# Patient Record
Sex: FEMALE | Race: OTHER | NOT HISPANIC OR LATINO | URBAN - METROPOLITAN AREA
[De-identification: names, ages, dates, MRNs, and addresses within clinical notes are randomized per-mention and may not be internally consistent; named-entity substitution may affect disease eponyms.]

---

## 2020-07-28 NOTE — ED PROCEDURE NOTE - PROCEDURE ADDITIONAL DETAILS
Emergency Department Focused Ultrasound performed at patient's bedside for educational purposes. The study will have a follow up study performed or was performed in the direct supervision of an ultrasound trained attending. + small free fluid in morrsion's pouch- plan for CT a/p to further eval.

## 2020-07-29 ENCOUNTER — INPATIENT (INPATIENT)
Facility: HOSPITAL | Age: 65
LOS: 1 days | Discharge: HOME | DRG: 919 | End: 2020-07-31
Attending: OBSTETRICS & GYNECOLOGY | Admitting: OBSTETRICS & GYNECOLOGY
Payer: COMMERCIAL

## 2020-07-29 VITALS
TEMPERATURE: 100 F | HEART RATE: 126 BPM | DIASTOLIC BLOOD PRESSURE: 63 MMHG | SYSTOLIC BLOOD PRESSURE: 86 MMHG | OXYGEN SATURATION: 95 % | WEIGHT: 279.99 LBS | RESPIRATION RATE: 22 BRPM | HEIGHT: 66 IN

## 2020-07-29 DIAGNOSIS — Z90.722 ACQUIRED ABSENCE OF OVARIES, BILATERAL: Chronic | ICD-10-CM

## 2020-07-29 DIAGNOSIS — K66.1 HEMOPERITONEUM: ICD-10-CM

## 2020-07-29 LAB
ALBUMIN SERPL ELPH-MCNC: 3.6 G/DL — SIGNIFICANT CHANGE UP (ref 3.3–5)
ALP SERPL-CCNC: 33 U/L — LOW (ref 40–120)
ALT FLD-CCNC: 17 U/L — SIGNIFICANT CHANGE UP (ref 10–45)
ANION GAP SERPL CALC-SCNC: 13 MMOL/L — SIGNIFICANT CHANGE UP (ref 5–17)
ANION GAP SERPL CALC-SCNC: 20 MMOL/L — HIGH (ref 5–17)
ANTIBODY ID 1_1: SIGNIFICANT CHANGE UP
APPEARANCE UR: CLEAR — SIGNIFICANT CHANGE UP
APTT BLD: 19.6 SEC — LOW (ref 27.5–35.5)
AST SERPL-CCNC: 28 U/L — SIGNIFICANT CHANGE UP (ref 10–40)
BASE EXCESS BLDV CALC-SCNC: -2.4 MMOL/L — LOW (ref -2–2)
BASE EXCESS BLDV CALC-SCNC: -3.7 MMOL/L — LOW (ref -2–2)
BASOPHILS # BLD AUTO: 0 K/UL — SIGNIFICANT CHANGE UP (ref 0–0.2)
BASOPHILS # BLD AUTO: 0.01 K/UL — SIGNIFICANT CHANGE UP (ref 0–0.2)
BASOPHILS NFR BLD AUTO: 0 % — SIGNIFICANT CHANGE UP (ref 0–2)
BASOPHILS NFR BLD AUTO: 0.1 % — SIGNIFICANT CHANGE UP (ref 0–2)
BILIRUB SERPL-MCNC: 0.3 MG/DL — SIGNIFICANT CHANGE UP (ref 0.2–1.2)
BILIRUB UR-MCNC: NEGATIVE — SIGNIFICANT CHANGE UP
BLD GP AB SCN SERPL QL: POSITIVE — SIGNIFICANT CHANGE UP
BUN SERPL-MCNC: 26 MG/DL — HIGH (ref 7–23)
BUN SERPL-MCNC: 28 MG/DL — HIGH (ref 7–23)
CA-I SERPL-SCNC: 1.02 MMOL/L — LOW (ref 1.12–1.3)
CA-I SERPL-SCNC: 1.06 MMOL/L — LOW (ref 1.12–1.3)
CALCIUM SERPL-MCNC: 8 MG/DL — LOW (ref 8.4–10.5)
CALCIUM SERPL-MCNC: 8.3 MG/DL — LOW (ref 8.4–10.5)
CHLORIDE BLDV-SCNC: 99 MMOL/L — SIGNIFICANT CHANGE UP (ref 96–108)
CHLORIDE BLDV-SCNC: 99 MMOL/L — SIGNIFICANT CHANGE UP (ref 96–108)
CHLORIDE SERPL-SCNC: 101 MMOL/L — SIGNIFICANT CHANGE UP (ref 96–108)
CHLORIDE SERPL-SCNC: 95 MMOL/L — LOW (ref 96–108)
CO2 BLDV-SCNC: 22 MMOL/L — SIGNIFICANT CHANGE UP (ref 22–30)
CO2 BLDV-SCNC: 24 MMOL/L — SIGNIFICANT CHANGE UP (ref 22–30)
CO2 SERPL-SCNC: 18 MMOL/L — LOW (ref 22–31)
CO2 SERPL-SCNC: 22 MMOL/L — SIGNIFICANT CHANGE UP (ref 22–31)
COLOR SPEC: YELLOW — SIGNIFICANT CHANGE UP
CREAT SERPL-MCNC: 1.19 MG/DL — SIGNIFICANT CHANGE UP (ref 0.5–1.3)
CREAT SERPL-MCNC: 1.53 MG/DL — HIGH (ref 0.5–1.3)
DIFF PNL FLD: NEGATIVE — SIGNIFICANT CHANGE UP
EOSINOPHIL # BLD AUTO: 0 K/UL — SIGNIFICANT CHANGE UP (ref 0–0.5)
EOSINOPHIL # BLD AUTO: 0 K/UL — SIGNIFICANT CHANGE UP (ref 0–0.5)
EOSINOPHIL NFR BLD AUTO: 0 % — SIGNIFICANT CHANGE UP (ref 0–6)
EOSINOPHIL NFR BLD AUTO: 0 % — SIGNIFICANT CHANGE UP (ref 0–6)
GAS PNL BLDV: 128 MMOL/L — LOW (ref 135–145)
GAS PNL BLDV: 129 MMOL/L — LOW (ref 135–145)
GAS PNL BLDV: SIGNIFICANT CHANGE UP
GLUCOSE BLDC GLUCOMTR-MCNC: 144 MG/DL — HIGH (ref 70–99)
GLUCOSE BLDC GLUCOMTR-MCNC: 156 MG/DL — HIGH (ref 70–99)
GLUCOSE BLDC GLUCOMTR-MCNC: 161 MG/DL — HIGH (ref 70–99)
GLUCOSE BLDV-MCNC: 161 MG/DL — HIGH (ref 70–99)
GLUCOSE BLDV-MCNC: 226 MG/DL — HIGH (ref 70–99)
GLUCOSE SERPL-MCNC: 164 MG/DL — HIGH (ref 70–99)
GLUCOSE SERPL-MCNC: 242 MG/DL — HIGH (ref 70–99)
GLUCOSE UR QL: NEGATIVE — SIGNIFICANT CHANGE UP
HCO3 BLDV-SCNC: 21 MMOL/L — SIGNIFICANT CHANGE UP (ref 21–29)
HCO3 BLDV-SCNC: 23 MMOL/L — SIGNIFICANT CHANGE UP (ref 21–29)
HCT VFR BLD CALC: 23.7 % — LOW (ref 34.5–45)
HCT VFR BLD CALC: 24 % — LOW (ref 34.5–45)
HCT VFR BLD CALC: 30.4 % — LOW (ref 34.5–45)
HCT VFR BLDA CALC: 25 % — LOW (ref 39–50)
HCT VFR BLDA CALC: 30 % — LOW (ref 39–50)
HGB BLD CALC-MCNC: 8 G/DL — LOW (ref 11.5–15.5)
HGB BLD CALC-MCNC: 9.5 G/DL — LOW (ref 11.5–15.5)
HGB BLD-MCNC: 10.2 G/DL — LOW (ref 11.5–15.5)
HGB BLD-MCNC: 7.8 G/DL — LOW (ref 11.5–15.5)
HGB BLD-MCNC: 8 G/DL — LOW (ref 11.5–15.5)
IMM GRANULOCYTES NFR BLD AUTO: 1.1 % — SIGNIFICANT CHANGE UP (ref 0–1.5)
INR BLD: 0.97 RATIO — SIGNIFICANT CHANGE UP (ref 0.88–1.16)
KETONES UR-MCNC: SIGNIFICANT CHANGE UP
LACTATE BLDV-MCNC: 3.4 MMOL/L — HIGH (ref 0.7–2)
LACTATE BLDV-MCNC: 5 MMOL/L — CRITICAL HIGH (ref 0.7–2)
LACTATE SERPL-SCNC: 2.1 MMOL/L — HIGH (ref 0.7–2)
LEUKOCYTE ESTERASE UR-ACNC: NEGATIVE — SIGNIFICANT CHANGE UP
LYMPHOCYTES # BLD AUTO: 0.37 K/UL — LOW (ref 1–3.3)
LYMPHOCYTES # BLD AUTO: 1.29 K/UL — SIGNIFICANT CHANGE UP (ref 1–3.3)
LYMPHOCYTES # BLD AUTO: 1.7 % — LOW (ref 13–44)
LYMPHOCYTES # BLD AUTO: 8.7 % — LOW (ref 13–44)
MCHC RBC-ENTMCNC: 29.9 PG — SIGNIFICANT CHANGE UP (ref 27–34)
MCHC RBC-ENTMCNC: 30 PG — SIGNIFICANT CHANGE UP (ref 27–34)
MCHC RBC-ENTMCNC: 31.6 PG — SIGNIFICANT CHANGE UP (ref 27–34)
MCHC RBC-ENTMCNC: 32.9 GM/DL — SIGNIFICANT CHANGE UP (ref 32–36)
MCHC RBC-ENTMCNC: 33.3 GM/DL — SIGNIFICANT CHANGE UP (ref 32–36)
MCHC RBC-ENTMCNC: 33.6 GM/DL — SIGNIFICANT CHANGE UP (ref 32–36)
MCV RBC AUTO: 89.4 FL — SIGNIFICANT CHANGE UP (ref 80–100)
MCV RBC AUTO: 90.8 FL — SIGNIFICANT CHANGE UP (ref 80–100)
MCV RBC AUTO: 94.9 FL — SIGNIFICANT CHANGE UP (ref 80–100)
MONOCYTES # BLD AUTO: 0.2 K/UL — SIGNIFICANT CHANGE UP (ref 0–0.9)
MONOCYTES # BLD AUTO: 1.17 K/UL — HIGH (ref 0–0.9)
MONOCYTES NFR BLD AUTO: 0.9 % — LOW (ref 2–14)
MONOCYTES NFR BLD AUTO: 7.9 % — SIGNIFICANT CHANGE UP (ref 2–14)
NEUTROPHILS # BLD AUTO: 12.19 K/UL — HIGH (ref 1.8–7.4)
NEUTROPHILS # BLD AUTO: 21.04 K/UL — HIGH (ref 1.8–7.4)
NEUTROPHILS NFR BLD AUTO: 82.2 % — HIGH (ref 43–77)
NEUTROPHILS NFR BLD AUTO: 95.6 % — HIGH (ref 43–77)
NEUTS BAND # BLD: 0.9 % — SIGNIFICANT CHANGE UP (ref 0–8)
NITRITE UR-MCNC: NEGATIVE — SIGNIFICANT CHANGE UP
NRBC # BLD: 0 /100 WBCS — SIGNIFICANT CHANGE UP (ref 0–0)
NRBC # BLD: 0 /100 WBCS — SIGNIFICANT CHANGE UP (ref 0–0)
OB PNL STL: NEGATIVE — SIGNIFICANT CHANGE UP
PCO2 BLDV: 38 MMHG — SIGNIFICANT CHANGE UP (ref 35–50)
PCO2 BLDV: 44 MMHG — SIGNIFICANT CHANGE UP (ref 35–50)
PH BLDV: 7.33 — LOW (ref 7.35–7.45)
PH BLDV: 7.36 — SIGNIFICANT CHANGE UP (ref 7.35–7.45)
PH UR: 6 — SIGNIFICANT CHANGE UP (ref 5–8)
PLAT MORPH BLD: NORMAL — SIGNIFICANT CHANGE UP
PLATELET # BLD AUTO: 152 K/UL — SIGNIFICANT CHANGE UP (ref 150–400)
PLATELET # BLD AUTO: 160 K/UL — SIGNIFICANT CHANGE UP (ref 150–400)
PLATELET # BLD AUTO: 246 K/UL — SIGNIFICANT CHANGE UP (ref 150–400)
PO2 BLDV: 26 MMHG — SIGNIFICANT CHANGE UP (ref 25–45)
PO2 BLDV: 27 MMHG — SIGNIFICANT CHANGE UP (ref 25–45)
POTASSIUM BLDV-SCNC: 3.7 MMOL/L — SIGNIFICANT CHANGE UP (ref 3.5–5.3)
POTASSIUM BLDV-SCNC: 3.9 MMOL/L — SIGNIFICANT CHANGE UP (ref 3.5–5.3)
POTASSIUM SERPL-MCNC: 4.2 MMOL/L — SIGNIFICANT CHANGE UP (ref 3.5–5.3)
POTASSIUM SERPL-MCNC: 4.4 MMOL/L — SIGNIFICANT CHANGE UP (ref 3.5–5.3)
POTASSIUM SERPL-SCNC: 4.2 MMOL/L — SIGNIFICANT CHANGE UP (ref 3.5–5.3)
POTASSIUM SERPL-SCNC: 4.4 MMOL/L — SIGNIFICANT CHANGE UP (ref 3.5–5.3)
PROT SERPL-MCNC: 5.8 G/DL — LOW (ref 6–8.3)
PROT UR-MCNC: SIGNIFICANT CHANGE UP
PROTHROM AB SERPL-ACNC: 11.6 SEC — SIGNIFICANT CHANGE UP (ref 10.6–13.6)
RBC # BLD: 2.53 M/UL — LOW (ref 3.8–5.2)
RBC # BLD: 2.61 M/UL — LOW (ref 3.8–5.2)
RBC # BLD: 3.4 M/UL — LOW (ref 3.8–5.2)
RBC # FLD: 13.6 % — SIGNIFICANT CHANGE UP (ref 10.3–14.5)
RBC # FLD: 15.9 % — HIGH (ref 10.3–14.5)
RBC # FLD: 16 % — HIGH (ref 10.3–14.5)
RBC BLD AUTO: NORMAL — SIGNIFICANT CHANGE UP
RH IG SCN BLD-IMP: NEGATIVE — SIGNIFICANT CHANGE UP
RH IG SCN BLD-IMP: NEGATIVE — SIGNIFICANT CHANGE UP
SAO2 % BLDV: 40 % — LOW (ref 67–88)
SAO2 % BLDV: 41 % — LOW (ref 67–88)
SARS-COV-2 RNA SPEC QL NAA+PROBE: SIGNIFICANT CHANGE UP
SODIUM SERPL-SCNC: 133 MMOL/L — LOW (ref 135–145)
SODIUM SERPL-SCNC: 136 MMOL/L — SIGNIFICANT CHANGE UP (ref 135–145)
SP GR SPEC: 1.02 — SIGNIFICANT CHANGE UP (ref 1.01–1.02)
UROBILINOGEN FLD QL: NEGATIVE — SIGNIFICANT CHANGE UP
VARIANT LYMPHS # BLD: 0.9 % — SIGNIFICANT CHANGE UP (ref 0–6)
WBC # BLD: 14.82 K/UL — HIGH (ref 3.8–10.5)
WBC # BLD: 19.15 K/UL — HIGH (ref 3.8–10.5)
WBC # BLD: 21.8 K/UL — HIGH (ref 3.8–10.5)
WBC # FLD AUTO: 14.82 K/UL — HIGH (ref 3.8–10.5)
WBC # FLD AUTO: 19.15 K/UL — HIGH (ref 3.8–10.5)
WBC # FLD AUTO: 21.8 K/UL — HIGH (ref 3.8–10.5)

## 2020-07-29 PROCEDURE — 86077 PHYS BLOOD BANK SERV XMATCH: CPT

## 2020-07-29 PROCEDURE — 71045 X-RAY EXAM CHEST 1 VIEW: CPT | Mod: 26

## 2020-07-29 PROCEDURE — 99231 SBSQ HOSP IP/OBS SF/LOW 25: CPT

## 2020-07-29 PROCEDURE — 74174 CTA ABD&PLVS W/CONTRAST: CPT | Mod: 26

## 2020-07-29 PROCEDURE — 99291 CRITICAL CARE FIRST HOUR: CPT

## 2020-07-29 RX ORDER — ACETAMINOPHEN 500 MG
975 TABLET ORAL EVERY 6 HOURS
Refills: 0 | Status: DISCONTINUED | OUTPATIENT
Start: 2020-07-29 | End: 2020-07-31

## 2020-07-29 RX ORDER — DEXTROSE 50 % IN WATER 50 %
25 SYRINGE (ML) INTRAVENOUS ONCE
Refills: 0 | Status: DISCONTINUED | OUTPATIENT
Start: 2020-07-29 | End: 2020-07-29

## 2020-07-29 RX ORDER — INSULIN LISPRO 100/ML
VIAL (ML) SUBCUTANEOUS AT BEDTIME
Refills: 0 | Status: DISCONTINUED | OUTPATIENT
Start: 2020-07-29 | End: 2020-07-29

## 2020-07-29 RX ORDER — PIPERACILLIN AND TAZOBACTAM 4; .5 G/20ML; G/20ML
3.38 INJECTION, POWDER, LYOPHILIZED, FOR SOLUTION INTRAVENOUS ONCE
Refills: 0 | Status: COMPLETED | OUTPATIENT
Start: 2020-07-29 | End: 2020-07-29

## 2020-07-29 RX ORDER — INSULIN LISPRO 100/ML
VIAL (ML) SUBCUTANEOUS
Refills: 0 | Status: DISCONTINUED | OUTPATIENT
Start: 2020-07-29 | End: 2020-07-29

## 2020-07-29 RX ORDER — SODIUM CHLORIDE 9 MG/ML
1000 INJECTION, SOLUTION INTRAVENOUS
Refills: 0 | Status: DISCONTINUED | OUTPATIENT
Start: 2020-07-29 | End: 2020-07-29

## 2020-07-29 RX ORDER — DEXTROSE 50 % IN WATER 50 %
12.5 SYRINGE (ML) INTRAVENOUS ONCE
Refills: 0 | Status: DISCONTINUED | OUTPATIENT
Start: 2020-07-29 | End: 2020-07-29

## 2020-07-29 RX ORDER — GLUCAGON INJECTION, SOLUTION 0.5 MG/.1ML
1 INJECTION, SOLUTION SUBCUTANEOUS ONCE
Refills: 0 | Status: DISCONTINUED | OUTPATIENT
Start: 2020-07-29 | End: 2020-07-31

## 2020-07-29 RX ORDER — DEXTROSE 50 % IN WATER 50 %
25 SYRINGE (ML) INTRAVENOUS ONCE
Refills: 0 | Status: DISCONTINUED | OUTPATIENT
Start: 2020-07-29 | End: 2020-07-31

## 2020-07-29 RX ORDER — DEXTROSE 50 % IN WATER 50 %
15 SYRINGE (ML) INTRAVENOUS ONCE
Refills: 0 | Status: DISCONTINUED | OUTPATIENT
Start: 2020-07-29 | End: 2020-07-31

## 2020-07-29 RX ORDER — OXYCODONE HYDROCHLORIDE 5 MG/1
5 TABLET ORAL EVERY 6 HOURS
Refills: 0 | Status: DISCONTINUED | OUTPATIENT
Start: 2020-07-29 | End: 2020-07-31

## 2020-07-29 RX ORDER — ACETAMINOPHEN 500 MG
650 TABLET ORAL ONCE
Refills: 0 | Status: COMPLETED | OUTPATIENT
Start: 2020-07-29 | End: 2020-07-29

## 2020-07-29 RX ORDER — SODIUM CHLORIDE 9 MG/ML
1000 INJECTION, SOLUTION INTRAVENOUS
Refills: 0 | Status: DISCONTINUED | OUTPATIENT
Start: 2020-07-29 | End: 2020-07-31

## 2020-07-29 RX ORDER — DEXTROSE 50 % IN WATER 50 %
12.5 SYRINGE (ML) INTRAVENOUS ONCE
Refills: 0 | Status: DISCONTINUED | OUTPATIENT
Start: 2020-07-29 | End: 2020-07-31

## 2020-07-29 RX ORDER — PANTOPRAZOLE SODIUM 20 MG/1
40 TABLET, DELAYED RELEASE ORAL ONCE
Refills: 0 | Status: DISCONTINUED | OUTPATIENT
Start: 2020-07-29 | End: 2020-07-31

## 2020-07-29 RX ORDER — INSULIN LISPRO 100/ML
VIAL (ML) SUBCUTANEOUS EVERY 6 HOURS
Refills: 0 | Status: DISCONTINUED | OUTPATIENT
Start: 2020-07-29 | End: 2020-07-31

## 2020-07-29 RX ORDER — DEXTROSE 50 % IN WATER 50 %
15 SYRINGE (ML) INTRAVENOUS ONCE
Refills: 0 | Status: DISCONTINUED | OUTPATIENT
Start: 2020-07-29 | End: 2020-07-29

## 2020-07-29 RX ORDER — GLUCAGON INJECTION, SOLUTION 0.5 MG/.1ML
1 INJECTION, SOLUTION SUBCUTANEOUS ONCE
Refills: 0 | Status: DISCONTINUED | OUTPATIENT
Start: 2020-07-29 | End: 2020-07-29

## 2020-07-29 RX ORDER — SODIUM CHLORIDE 9 MG/ML
1000 INJECTION INTRAMUSCULAR; INTRAVENOUS; SUBCUTANEOUS ONCE
Refills: 0 | Status: COMPLETED | OUTPATIENT
Start: 2020-07-29 | End: 2020-07-29

## 2020-07-29 RX ORDER — ONDANSETRON 8 MG/1
4 TABLET, FILM COATED ORAL ONCE
Refills: 0 | Status: COMPLETED | OUTPATIENT
Start: 2020-07-29 | End: 2020-07-29

## 2020-07-29 RX ADMIN — Medication 1: at 18:46

## 2020-07-29 RX ADMIN — ONDANSETRON 4 MILLIGRAM(S): 8 TABLET, FILM COATED ORAL at 12:55

## 2020-07-29 RX ADMIN — PIPERACILLIN AND TAZOBACTAM 200 GRAM(S): 4; .5 INJECTION, POWDER, LYOPHILIZED, FOR SOLUTION INTRAVENOUS at 13:32

## 2020-07-29 RX ADMIN — SODIUM CHLORIDE 125 MILLILITER(S): 9 INJECTION, SOLUTION INTRAVENOUS at 16:49

## 2020-07-29 RX ADMIN — SODIUM CHLORIDE 1000 MILLILITER(S): 9 INJECTION INTRAMUSCULAR; INTRAVENOUS; SUBCUTANEOUS at 12:54

## 2020-07-29 RX ADMIN — SODIUM CHLORIDE 125 MILLILITER(S): 9 INJECTION, SOLUTION INTRAVENOUS at 23:17

## 2020-07-29 RX ADMIN — Medication 650 MILLIGRAM(S): at 13:20

## 2020-07-29 RX ADMIN — Medication 975 MILLIGRAM(S): at 23:15

## 2020-07-29 NOTE — ED ADULT NURSE REASSESSMENT NOTE - NS ED NURSE REASSESS COMMENT FT1
pt resting on stretcher, vital signs remain stable, pt receiving 3rd unit of blood as ordered, pt denies any needs at this time, rn will continue to monitor.

## 2020-07-29 NOTE — ED ADULT NURSE NOTE - OBJECTIVE STATEMENT
pt reports he had a left oophrectomy yesterday, since the procedure she said shes felt faint and weak.  pt denies any fevers at home. pt denies any active bleeding. pt does report she has had urinary retention today. pt Is alert and oriented x3, speaking full clear sentences, respirations non-labored, skin warm dry and intact but pale, bruising throughout abd noted, 3 surgical lap sites on abd appear well.  there is no edema on assessment, strong pulses throughout, abd is soft and non-distended pt is able to move all extremities on command.

## 2020-07-29 NOTE — CONSULT NOTE ADULT - ASSESSMENT
Assessment: 65y Female with hemoperitoneum following BSO 1 day ago, referred for potential endovascular therapy. CTA was performed and was negative for active bleeding.    Plan:  -given negative CTA for arterial hemorrhage the yield of angiography is extremely low and there is no indication for angiography at this time  -if patient's clinical status worsens and repeat imaging is performed, and a source of hemorrhage is identified, reconsult interventional radiology for reconsideration for angiography/embolization  -Please call interventional radiology at [phone] during normal business hours, or [phone] and page [phone] during call hours or weekends with any questions, concerns or issues.    Vivek Mojica MD, RPVI  Chief Resident, Interventional Radiology  [facility]: [phone] ([phone]  [facility]: [phone] Assessment: 65y Female with hemoperitoneum following BSO 1 day ago, referred for potential drainage of the hemoperitoneum. CTA was performed and was negative for active bleeding.    Plan:  -given negative CTA for arterial hemorrhage the yield of angiography is extremely low and there is no indication for angiography at this time  -would recommend observation and symptomatic management over drainage of hemoperitoneum  -Please call interventional radiology at [phone] during normal business hours, or [phone] and page [phone] during call hours or weekends with any questions, concerns or issues.    Vivek Mojica MD, RPVI  Chief Resident, Interventional Radiology  [facility]: [phone] (p) [phone]  [facility]: [phone]

## 2020-07-29 NOTE — ED ADULT NURSE NOTE - CHIEF COMPLAINT QUOTE
dizzy, urinary retention/constipated s/p ovarian cyst and elizabeth laporoscopic oopherectomy surgery yesterday at [facility]

## 2020-07-29 NOTE — ED ADULT NURSE REASSESSMENT NOTE - NS ED NURSE REASSESS COMMENT FT1
pt resting on stretcher,  pt reports that she is no longer having the faint/dizzy feeling unless she moves her head, pt denies any abd pain,  pts color has improved also , she is not as pale as she was before receiving the 2 units of rbcs, all vital signs stable, rn will continue to monitor.

## 2020-07-29 NOTE — H&P ADULT - NSHPPHYSICALEXAM_GEN_ALL_CORE
Vital Signs Last 24 Hrs  T(C): 37.8 (29 Jul 2020 17:30), Max: 37.9 (29 Jul 2020 13:00)  T(F): 100 (29 Jul 2020 17:30), Max: 100.2 (29 Jul 2020 13:00)  HR: 93 (29 Jul 2020 17:30) (93 - 133)  BP: 124/77 (29 Jul 2020 17:30) (73/54 - 141/84)  BP(mean): --  RR: 16 (29 Jul 2020 17:30) (16 - 24)  SpO2: 99% (29 Jul 2020 17:30) (95% - 100%)    Physical Exam:   General: NAD  CV: RR S1S2 no m/r/g  Lungs: CTA b/l, unlabored on RA  Abd: Soft, mildly distended, appropriately tender, + BS, laparoscopic incisions c/d/i, diffuse ecchymosis in lower abdomen   Ext: non-tender b/l, no edema

## 2020-07-29 NOTE — ED PROVIDER NOTE - ATTENDING CONTRIBUTION TO CARE
Mendez DO: I have personally performed a face to face medical and diagnostic evaluation of the patient. I have discussed with and reviewed Dr Alisson Hanson, [employer] Fellow's, note and agree with the History, ROS, Physical Exam and MDM unless otherwise indicated. A brief summary of my personal evaluation and impression can be found below.     This patient is a 65F POD #1 from b/l oophorectomy, arrived to critical room B hypotensive systolics in the 70s with tachycardia 130-140, with reported generalized fatigue, weakness, dizziness and lack of urination post procedure x 24 hours (states only urinated x 1).  On exam pt oriented and alert. Pt noted to have diffuse abdominal ecchymosis, sutures of laparoscopic site of procedure intact, mild abd discomfort with +abd distension, but no rebound tenderness. Emergent release blood x 2 activated due to high suspicion for hemorrhagic shock, and 1L NS started to bridge to transfusion given profound hypotension, with good response in vitals and clinical symptoms. Pt rectally afebrile. Bedside Fast with very small free fluid in zepeda's pouch. OBGYN team notified of high suspicion for intraperitoneal bleeding. CTA a/p to eval for bleeding/source ordered but delayed due to resuscitation. Plan likely OR pending OBGYN assessment. Findings not suggestive of sepsis.     Hungarian  [id number] used for history taking and blood consent.

## 2020-07-29 NOTE — ED ADULT NURSE NOTE - NSIMPLEMENTINTERV_GEN_ALL_ED
Implemented All Universal Safety Interventions:  [place] to call system. Call bell, personal items and telephone within reach. Instruct patient to call for assistance. Room bathroom lighting operational. Non-slip footwear when patient is off stretcher. Physically safe environment: no spills, clutter or unnecessary equipment. Stretcher in lowest position, wheels locked, appropriate side rails in place.

## 2020-07-29 NOTE — ED PROVIDER NOTE - PMH
Arthritis    DM (diabetes mellitus) Arthritis    DM (diabetes mellitus)    HLD (hyperlipidemia)    HTN (hypertension)

## 2020-07-29 NOTE — H&P ADULT - HISTORY OF PRESENT ILLNESS
to be filled out REED MAYER  65y  Female [id number]     ID: [id number], [id number] Russian    HPI: 66yo P1, postmenopausal female, POD#1 s/p b/l oophorectomy presenting with weakness and subjective low urine output  Upon arrival to ED pt hypotensive to 70s/50s, tachycardic to 120-130s s/p 2uPRBC and 1L IVF bolus with improvement in vital signs. On face to face, pt reports progressive weakness, nausea, and decreased urine output. States she has only urinated 3 times since surgery with small amounts, denies urge to void. She reports minimal abdominal pain, some vaginal spotting, otherwise denies chest pain, shortness of breath, palpitations.    Ob/Gyn Physician: Dr. Woo ([facility]); called and spoke to him over the phone; states uncomplicated LSC b/l oophorectomy with minimal blood loss.     Obhx:  x1  GynHx: Denies fibroids, abnormal pap smears, STIs, oophorectomy for ovarian cysts  PMH: Arthritis, HLD, Diabetes, GERD, HTN  PSH: appendectomy in childhood    Home Medications:  adalimumab 40 mg/0.4 mL subcutaneous kit: 40 milligram(s) subcutaneous every other week (2020 16:53)  amLODIPine 5 mg oral tablet: 1 tab(s) orally once a day (2020 16:54)  aspirin 81 mg oral tablet: orally once a day (2020 16:54)  atorvastatin 10 mg oral tablet: 1 tab(s) orally once a day (2020 16:56)  esomeprazole 40 mg oral delayed release capsule: 1 cap(s) orally once a day (2020 16:57)  folic acid 1 mg oral tablet: 1 tab(s) orally once a day (2020 16:57)  metFORMIN 500 mg oral tablet: 1 tab(s) orally 2 times a day (2020 16:59)  Methotrexate Sodium, Preservative Free:  (2020 17:00)      Hospital Meds:   MEDICATIONS  (STANDING):  acetaminophen   Tablet .. 975 milliGRAM(s) Oral every 6 hours  dextrose 5%. 1000 milliLiter(s) (50 mL/Hr) IV Continuous <Continuous>  dextrose 50% Injectable 12.5 Gram(s) IV Push once  dextrose 50% Injectable 25 Gram(s) IV Push once  dextrose 50% Injectable 25 Gram(s) IV Push once  insulin lispro (HumaLOG) corrective regimen sliding scale   SubCutaneous three times a day before meals  insulin lispro (HumaLOG) corrective regimen sliding scale   SubCutaneous at bedtime  lactated ringers. 1000 milliLiter(s) (125 mL/Hr) IV Continuous <Continuous>    MEDICATIONS  (PRN):  dextrose 40% Gel 15 Gram(s) Oral once PRN Blood Glucose LESS THAN 70 milliGRAM(s)/deciliter  glucagon  Injectable 1 milliGRAM(s) IntraMuscular once PRN Glucose LESS THAN 70 milligrams/deciliter  oxyCODONE    IR 5 milliGRAM(s) Oral every 6 hours PRN Severe Pain (7 - 10)

## 2020-07-29 NOTE — ED PROVIDER NOTE - CLINICAL SUMMARY MEDICAL DECISION MAKING FREE TEXT BOX
66yo F presents s/p B/L oophorectomy with nausea/dizziness/difficulty urinating presents tachycardic, hypotensive 70/40s, receiving 2 units emergent release blood, pending CTA abd/pelvis, OB team made aware

## 2020-07-29 NOTE — H&P ADULT - NSHPLABSRESULTS_GEN_ALL_CORE
LABS:                              7.8    14.82 )-----------( 152      ( 2020 14:41 )             23.7         133<L>  |  95<L>  |  28<H>  ----------------------------<  242<H>  4.2   |  18<L>  |  1.53<H>    Ca    8.3<L>      2020 12:43    TPro  5.8<L>  /  Alb  3.6  /  TBili  0.3  /  DBili  x   /  AST  28  /  ALT  17  /  AlkPhos  33<L>      I&O's Detail    2020 07:01  -  2020 18:33  --------------------------------------------------------  IN:  Total IN: 0 mL    OUT:    Indwelling Catheter - Urethral: 600 mL  Total OUT: 600 mL    Total NET: -600 mL        PT/INR - ( 2020 12:43 )   PT: 11.6 sec;   INR: 0.97 ratio         PTT - ( 2020 12:43 )  PTT:19.6 sec  Urinalysis Basic - ( 2020 14:00 )    Color: Yellow / Appearance: Clear / S.022 / pH: x  Gluc: x / Ketone: Trace  / Bili: Negative / Urobili: Negative   Blood: x / Protein: Trace / Nitrite: Negative   Leuk Esterase: Negative / RBC: x / WBC x   Sq Epi: x / Non Sq Epi: x / Bacteria: x    < from: CT Angio Abdomen and Pelvis w/ IV Cont (20 @ 14:13) >    EXAM:  CT ANGIO ABD PELV (W)AW IC                        FINDINGS:  LOWER CHEST: Moderate-sized hiatus hernia.    LIVER: Within normal limits.  BILE DUCTS: Normal caliber.  GALLBLADDER: Gallstones.  SPLEEN: Within normal limits.  PANCREAS: Within normal limits.  ADRENALS: 3.8 x 3.1 cm right adrenal lesion consistent with benign adenoma on precontrast images.  KIDNEYS/URETERS: Within normal limits.    BLADDER: Within normal limits.  REPRODUCTIVE ORGANS: Apparent small uterus identified on sagittal venous phase    BOWEL: No bowel obstruction. Appendix not visualized. Colonic diverticula.  PERITONEUM: Moderate to large hemoperitoneum. No active hemorrhage identified. 3 cm mid abdominal cyst. Small pneumoperitoneum consistent with recent surgery  VESSELS: Within normal limits.  RETROPERITONEUM/LYMPH NODES: No lymphadenopathy.  ABDOMINAL WALL: Infiltrative changes, more marked on the left, consistent with recent surgery  BONES: Degenerative change.    IMPRESSION:  Moderate to large hemoperitoneum. No active hemorrhage identified.  Right adrenal lesion with benign features.  Mid abdominal cyst.  Gallstones.                    RONNIE BEARDEN M.D., ATTENDING RADIOLOGIST  This document has been electronically signed. 2020  2:41PM        < end of copied text >

## 2020-07-29 NOTE — ED PROVIDER NOTE - CARE PLAN
Principal Discharge DX:	Hemoperitoneum Principal Discharge DX:	Hemoperitoneum  Secondary Diagnosis:	Hemorrhagic shock

## 2020-07-29 NOTE — ED ADULT NURSE REASSESSMENT NOTE - NS ED NURSE REASSESS COMMENT FT1
elias catheter inserted with teresa davis, pt tolerated procedure well. immediate return of 600 ml clear yellow urine

## 2020-07-29 NOTE — ED PROVIDER NOTE - PROGRESS NOTE DETAILS
Mendez DO: pt reassessed, stable vitals, ct a/p noted to have bleeding, no active extrav per radiologist, repeating vbg/ cbc, obgyn paged, apparent bleeding from prior oophorectomy site Mendez DO: pt with continued low grade tachycardia but normal mentation, normal blood pressure, obgyn resident at bedside, to discuss with her attending, discussed severity of pts illness and possible decompensation if bleeding restarts, rpt cbc was checked very early so may not be accurate but did not show an appropriate response to the 2u prbc transfusion from pts initial arrival Mendez DO: pt consented on arrival for emergent blood- Italian  [id number] used, witnessed by RN. Pt reassessed after 2u prbc of emergent blood, stable vitals, ct a/p noted to have bleeding, no active extrav per radiologist, repeating vbg/ cbc, obgyn paged, apparent bleeding from prior oophorectomy site, Mendez DO: pt consented on arrival for emergent blood- [place]  [id number] used, witnessed by RN Tank Mendez. Pt reassessed after 2u prbc of emergent blood, initial FAST +, discussed with obgyn, stable vitals, ct a/p noted to have bleeding, no active extrav per radiologist, repeating vbg/ cbc, obgyn paged, apparent bleeding from prior oophorectomy site, Mendez DO: pt with continued low grade tachycardia but normal mentation, normal blood pressure, obgyn resident at bedside, to discuss with her attending, discussed severity of pts illness and possible decompensation if bleeding restarts, rpt cbc was checked very early so may not be accurate but did not show an appropriate response to the 2u prbc transfusion from pts initial arrival. Pt initially afebrile, temp slowly rising 100.2, possible 2/2 to intraperitoneal blood but blood cx and abx ordered to cover for infectious etiology.

## 2020-07-29 NOTE — ED PROVIDER NOTE - OBJECTIVE STATEMENT
66yo F pmhx HTN HLD DM arthritis s/p B/L oophorectomy for postmenopausal ovarian cysts yesterday, comes in for dizziness, nausea and difficulty urinating since the procedure. Arrives in ED hypotensive and tachycardic. Denies fever chills cp sob abd pain.

## 2020-07-29 NOTE — H&P ADULT - PROBLEM SELECTOR PLAN 1
- Admit to Gyn Team  - NPO, IVF  - IR consulted for possible drainage  - will give 1uPRBC now  - 6pm Labs  - AM labs  - strict I&O, Abraham  - f/u blood and urine cx  - SCDs for DVT ppx  - ISS, monitor FS  - Tylenol, Oxcodone PRN for pain    PEDRO Mccall, PGY-2  patient seen and evaluated with Dr. Mcqueen

## 2020-07-29 NOTE — ED ADULT NURSE REASSESSMENT NOTE - NS ED NURSE REASSESS COMMENT FT1
0005 pt arrival to critical room b, pt is tachycardic to 130s and has pale skin tone.  md sen called to bedside immediately, iv access obtain and pt placed on monitor.  per md sen pt requiring emergent blood.  blood will be infused as ordered, please see blood transfusion documentation.

## 2020-07-29 NOTE — CONSULT NOTE ADULT - SUBJECTIVE AND OBJECTIVE BOX
Reason for Consult: 65y Female s/p bilateral oophorectomy presented with hemoperitoneum, IR consulted for potential embolization.    History of Present Illness:     REED MAYER  65y  Female [id number]     ID: [id number], [id number] [place]    HPI: 64yo P1, postmenopausal female, POD#1 s/p b/l oophorectomy presenting with weakness and subjective low urine output  Upon arrival to ED pt hypotensive to 70s/50s, tachycardic to 120-130s s/p 2uPRBC and 1L IVF bolus with improvement in vital signs. On face to face, pt reports progressive weakness, nausea, and decreased urine output. States she has only urinated 3 times since surgery with small amounts, denies urge to void. She reports minimal abdominal pain, some vaginal spotting, otherwise denies chest pain, shortness of breath, palpitations.    Ob/Gyn Physician: Dr. Woo ([facility]); called and spoke to him over the phone; states uncomplicated LSC b/l oophorectomy with minimal blood loss.     Obhx:  x1  GynHx: Denies fibroids, abnormal pap smears, STIs, oophorectomy for ovarian cysts  PMH: Arthritis, HLD, Diabetes, GERD, HTN  PSH: appendectomy in childhood    Home Medications:  adalimumab 40 mg/0.4 mL subcutaneous kit: 40 milligram(s) subcutaneous every other week (2020 16:53)  amLODIPine 5 mg oral tablet: 1 tab(s) orally once a day (2020 16:54)  aspirin 81 mg oral tablet: orally once a day (2020 16:54)  atorvastatin 10 mg oral tablet: 1 tab(s) orally once a day (2020 16:56)  esomeprazole 40 mg oral delayed release capsule: 1 cap(s) orally once a day (2020 16:57)  folic acid 1 mg oral tablet: 1 tab(s) orally once a day (2020 16:57)  metFORMIN 500 mg oral tablet: 1 tab(s) orally 2 times a day (2020 16:59)  Methotrexate Sodium, Preservative Free:  (2020 17:00)      Hospital Meds:   MEDICATIONS  (STANDING):  acetaminophen   Tablet .. 975 milliGRAM(s) Oral every 6 hours  dextrose 5%. 1000 milliLiter(s) (50 mL/Hr) IV Continuous <Continuous>  dextrose 50% Injectable 12.5 Gram(s) IV Push once  dextrose 50% Injectable 25 Gram(s) IV Push once  dextrose 50% Injectable 25 Gram(s) IV Push once  insulin lispro (HumaLOG) corrective regimen sliding scale   SubCutaneous three times a day before meals  insulin lispro (HumaLOG) corrective regimen sliding scale   SubCutaneous at bedtime  lactated ringers. 1000 milliLiter(s) (125 mL/Hr) IV Continuous <Continuous>    MEDICATIONS  (PRN):  dextrose 40% Gel 15 Gram(s) Oral once PRN Blood Glucose LESS THAN 70 milliGRAM(s)/deciliter  glucagon  Injectable 1 milliGRAM(s) IntraMuscular once PRN Glucose LESS THAN 70 milligrams/deciliter  oxyCODONE    IR 5 milliGRAM(s) Oral every 6 hours PRN Severe Pain (7 - 10) (2020 16:30)      Pertinent PMH/PSH:     HTN (hypertension)  HLD (hyperlipidemia)  Arthritis  DM (diabetes mellitus)  Status post bilateral oophorectomy      Allergies:     lidocaine (Unknown)      Medications:     lactated ringers.  acetaminophen   Tablet ..  oxyCODONE    IR  pantoprazole  Injectable  insulin lispro (HumaLOG) corrective regimen sliding scale  dextrose 5%.  dextrose 40% Gel  dextrose 50% Injectable  dextrose 50% Injectable  dextrose 50% Injectable  glucagon  Injectable      Vital Signs:    T(C): 37.4 (20 @ 21:29), Max: 37.9 (20 @ 13:00)  HR: 79 (20 @ 21:29) (79 - 133)  BP: 113/72 (20 @ 21:29) (73/54 - 141/84)  RR: 17 (20 @ 21:29) (16 - 24)  SpO2: 94% (20 @ 21:29) (94% - 100%)    Relevant Lab Results:            10.2  19.15)-----(160     (20 @ 19:06)         30.4     136 | 101 | 26  ----------------------< 164     (20 @ 19:06)  4.4 | 22 | 1.19       PT: 11.6 20 @ 12:43  aPTT: 19.6<L> 20 @ 12:43   INR: 0.97 20 @ 12:43      Imaging: CTA reviewed, there is moderate hemoperitoneum without arterial source of hemorrhage on arterial phase images. Reason for Consult: 65y Female s/p bilateral oophorectomy presented with hemoperitoneum, IR consulted for drainage of hemoperitoneum.    History of Present Illness:     REED MAYER  65y  Female [id number]     ID: [id number], [id number] [place]    HPI: 66yo P1, postmenopausal female, POD#1 s/p b/l oophorectomy presenting with weakness and subjective low urine output  Upon arrival to ED pt hypotensive to 70s/50s, tachycardic to 120-130s s/p 2uPRBC and 1L IVF bolus with improvement in vital signs. On face to face, pt reports progressive weakness, nausea, and decreased urine output. States she has only urinated 3 times since surgery with small amounts, denies urge to void. She reports minimal abdominal pain, some vaginal spotting, otherwise denies chest pain, shortness of breath, palpitations.    Ob/Gyn Physician: Dr. Woo ([facility]); called and spoke to him over the phone; states uncomplicated LSC b/l oophorectomy with minimal blood loss.     Obhx:  x1  GynHx: Denies fibroids, abnormal pap smears, STIs, oophorectomy for ovarian cysts  PMH: Arthritis, HLD, Diabetes, GERD, HTN  PSH: appendectomy in childhood    Home Medications:  adalimumab 40 mg/0.4 mL subcutaneous kit: 40 milligram(s) subcutaneous every other week (2020 16:53)  amLODIPine 5 mg oral tablet: 1 tab(s) orally once a day (2020 16:54)  aspirin 81 mg oral tablet: orally once a day (2020 16:54)  atorvastatin 10 mg oral tablet: 1 tab(s) orally once a day (2020 16:56)  esomeprazole 40 mg oral delayed release capsule: 1 cap(s) orally once a day (2020 16:57)  folic acid 1 mg oral tablet: 1 tab(s) orally once a day (2020 16:57)  metFORMIN 500 mg oral tablet: 1 tab(s) orally 2 times a day (2020 16:59)  Methotrexate Sodium, Preservative Free:  (2020 17:00)      Hospital Meds:   MEDICATIONS  (STANDING):  acetaminophen   Tablet .. 975 milliGRAM(s) Oral every 6 hours  dextrose 5%. 1000 milliLiter(s) (50 mL/Hr) IV Continuous <Continuous>  dextrose 50% Injectable 12.5 Gram(s) IV Push once  dextrose 50% Injectable 25 Gram(s) IV Push once  dextrose 50% Injectable 25 Gram(s) IV Push once  insulin lispro (HumaLOG) corrective regimen sliding scale   SubCutaneous three times a day before meals  insulin lispro (HumaLOG) corrective regimen sliding scale   SubCutaneous at bedtime  lactated ringers. 1000 milliLiter(s) (125 mL/Hr) IV Continuous <Continuous>    MEDICATIONS  (PRN):  dextrose 40% Gel 15 Gram(s) Oral once PRN Blood Glucose LESS THAN 70 milliGRAM(s)/deciliter  glucagon  Injectable 1 milliGRAM(s) IntraMuscular once PRN Glucose LESS THAN 70 milligrams/deciliter  oxyCODONE    IR 5 milliGRAM(s) Oral every 6 hours PRN Severe Pain (7 - 10) (2020 16:30)      Pertinent PMH/PSH:     HTN (hypertension)  HLD (hyperlipidemia)  Arthritis  DM (diabetes mellitus)  Status post bilateral oophorectomy      Allergies:     lidocaine (Unknown)      Medications:     lactated ringers.  acetaminophen   Tablet ..  oxyCODONE    IR  pantoprazole  Injectable  insulin lispro (HumaLOG) corrective regimen sliding scale  dextrose 5%.  dextrose 40% Gel  dextrose 50% Injectable  dextrose 50% Injectable  dextrose 50% Injectable  glucagon  Injectable      Vital Signs:    T(C): 37.4 (20 @ 21:29), Max: 37.9 (20 @ 13:00)  HR: 79 (20 @ 21:29) (79 - 133)  BP: 113/72 (20 @ 21:29) (73/54 - 141/84)  RR: 17 (20 @ 21:29) (16 - 24)  SpO2: 94% (20 @ 21:29) (94% - 100%)    Relevant Lab Results:            10.2  19.15)-----(160     (20 @ 19:06)         30.4     136 | 101 | 26  ----------------------< 164     (20 @ 19:06)  4.4 | 22 | 1.19       PT: 11.6 20 @ 12:43  aPTT: 19.6<L> 20 @ 12:43   INR: 0.97 20 @ 12:43      Imaging: CTA reviewed, there is moderate hemoperitoneum without arterial source of hemorrhage on arterial phase images.

## 2020-07-29 NOTE — ED ADULT NURSE REASSESSMENT NOTE - NS ED NURSE REASSESS COMMENT FT1
pt straight cathed at this time for 300 cc clear yellow urine,, rn ryan at bedside helping with procedure, pt handled procedure well.

## 2020-07-29 NOTE — H&P ADULT - ASSESSMENT
66yo P1, postmenopausal female, POD#1 s/p b/l oophorectomy presenting with weakness, abdominal ecchymosis and subjective low urine output with CT findings of large hemoperitoneum and no active bleeding. Pt initially presenting hypotensive and tachycardic, s/p 1L IV Bolus and 2uPRBC. Hemoperitoneum and abdominal ecchymosis likely 2/2 transection of inferior epigastric arteries during laparoscopic entry.

## 2020-07-30 LAB
A1C WITH ESTIMATED AVERAGE GLUCOSE RESULT: 5.4 % — SIGNIFICANT CHANGE UP (ref 4–5.6)
ANION GAP SERPL CALC-SCNC: 10 MMOL/L — SIGNIFICANT CHANGE UP (ref 5–17)
ANION GAP SERPL CALC-SCNC: 7 MMOL/L — SIGNIFICANT CHANGE UP (ref 5–17)
APTT BLD: 23.1 SEC — LOW (ref 27.5–35.5)
APTT BLD: 23.6 SEC — LOW (ref 27.5–35.5)
BASOPHILS # BLD AUTO: 0.01 K/UL — SIGNIFICANT CHANGE UP (ref 0–0.2)
BASOPHILS NFR BLD AUTO: 0.1 % — SIGNIFICANT CHANGE UP (ref 0–2)
BUN SERPL-MCNC: 17 MG/DL — SIGNIFICANT CHANGE UP (ref 7–23)
BUN SERPL-MCNC: 20 MG/DL — SIGNIFICANT CHANGE UP (ref 7–23)
CALCIUM SERPL-MCNC: 8 MG/DL — LOW (ref 8.4–10.5)
CALCIUM SERPL-MCNC: 8.3 MG/DL — LOW (ref 8.4–10.5)
CHLORIDE SERPL-SCNC: 106 MMOL/L — SIGNIFICANT CHANGE UP (ref 96–108)
CHLORIDE SERPL-SCNC: 109 MMOL/L — HIGH (ref 96–108)
CO2 SERPL-SCNC: 26 MMOL/L — SIGNIFICANT CHANGE UP (ref 22–31)
CO2 SERPL-SCNC: 28 MMOL/L — SIGNIFICANT CHANGE UP (ref 22–31)
CREAT SERPL-MCNC: 0.76 MG/DL — SIGNIFICANT CHANGE UP (ref 0.5–1.3)
CREAT SERPL-MCNC: 0.84 MG/DL — SIGNIFICANT CHANGE UP (ref 0.5–1.3)
CULTURE RESULTS: NO GROWTH — SIGNIFICANT CHANGE UP
EOSINOPHIL # BLD AUTO: 0 K/UL — SIGNIFICANT CHANGE UP (ref 0–0.5)
EOSINOPHIL NFR BLD AUTO: 0 % — SIGNIFICANT CHANGE UP (ref 0–6)
ESTIMATED AVERAGE GLUCOSE: 108 MG/DL — SIGNIFICANT CHANGE UP (ref 68–114)
FIBRINOGEN PPP-MCNC: 371 MG/DL — SIGNIFICANT CHANGE UP (ref 350–510)
FIBRINOGEN PPP-MCNC: 392 MG/DL — SIGNIFICANT CHANGE UP (ref 350–510)
GLUCOSE BLDC GLUCOMTR-MCNC: 103 MG/DL — HIGH (ref 70–99)
GLUCOSE BLDC GLUCOMTR-MCNC: 116 MG/DL — HIGH (ref 70–99)
GLUCOSE BLDC GLUCOMTR-MCNC: 118 MG/DL — HIGH (ref 70–99)
GLUCOSE BLDC GLUCOMTR-MCNC: 96 MG/DL — SIGNIFICANT CHANGE UP (ref 70–99)
GLUCOSE SERPL-MCNC: 101 MG/DL — HIGH (ref 70–99)
GLUCOSE SERPL-MCNC: 113 MG/DL — HIGH (ref 70–99)
HCT VFR BLD CALC: 26 % — LOW (ref 34.5–45)
HCT VFR BLD CALC: 27.3 % — LOW (ref 34.5–45)
HCT VFR BLD CALC: 27.6 % — LOW (ref 34.5–45)
HCV AB S/CO SERPL IA: 0.06 S/CO — SIGNIFICANT CHANGE UP (ref 0–0.99)
HCV AB SERPL-IMP: SIGNIFICANT CHANGE UP
HGB BLD-MCNC: 8.8 G/DL — LOW (ref 11.5–15.5)
HGB BLD-MCNC: 9 G/DL — LOW (ref 11.5–15.5)
HGB BLD-MCNC: 9 G/DL — LOW (ref 11.5–15.5)
IMM GRANULOCYTES NFR BLD AUTO: 0.7 % — SIGNIFICANT CHANGE UP (ref 0–1.5)
INR BLD: 0.97 RATIO — SIGNIFICANT CHANGE UP (ref 0.88–1.16)
INR BLD: 0.98 RATIO — SIGNIFICANT CHANGE UP (ref 0.88–1.16)
LACTATE SERPL-SCNC: 1 MMOL/L — SIGNIFICANT CHANGE UP (ref 0.7–2)
LYMPHOCYTES # BLD AUTO: 17.4 % — SIGNIFICANT CHANGE UP (ref 13–44)
LYMPHOCYTES # BLD AUTO: 2.39 K/UL — SIGNIFICANT CHANGE UP (ref 1–3.3)
MCHC RBC-ENTMCNC: 30 PG — SIGNIFICANT CHANGE UP (ref 27–34)
MCHC RBC-ENTMCNC: 30.3 PG — SIGNIFICANT CHANGE UP (ref 27–34)
MCHC RBC-ENTMCNC: 30.8 PG — SIGNIFICANT CHANGE UP (ref 27–34)
MCHC RBC-ENTMCNC: 32.6 GM/DL — SIGNIFICANT CHANGE UP (ref 32–36)
MCHC RBC-ENTMCNC: 33 GM/DL — SIGNIFICANT CHANGE UP (ref 32–36)
MCHC RBC-ENTMCNC: 33.8 GM/DL — SIGNIFICANT CHANGE UP (ref 32–36)
MCV RBC AUTO: 90.9 FL — SIGNIFICANT CHANGE UP (ref 80–100)
MCV RBC AUTO: 91.9 FL — SIGNIFICANT CHANGE UP (ref 80–100)
MCV RBC AUTO: 92 FL — SIGNIFICANT CHANGE UP (ref 80–100)
MONOCYTES # BLD AUTO: 0.97 K/UL — HIGH (ref 0–0.9)
MONOCYTES NFR BLD AUTO: 7.1 % — SIGNIFICANT CHANGE UP (ref 2–14)
NEUTROPHILS # BLD AUTO: 10.25 K/UL — HIGH (ref 1.8–7.4)
NEUTROPHILS NFR BLD AUTO: 74.7 % — SIGNIFICANT CHANGE UP (ref 43–77)
NRBC # BLD: 0 /100 WBCS — SIGNIFICANT CHANGE UP (ref 0–0)
PLATELET # BLD AUTO: 136 K/UL — LOW (ref 150–400)
PLATELET # BLD AUTO: 137 K/UL — LOW (ref 150–400)
PLATELET # BLD AUTO: 147 K/UL — LOW (ref 150–400)
POTASSIUM SERPL-MCNC: 4 MMOL/L — SIGNIFICANT CHANGE UP (ref 3.5–5.3)
POTASSIUM SERPL-MCNC: 4 MMOL/L — SIGNIFICANT CHANGE UP (ref 3.5–5.3)
POTASSIUM SERPL-SCNC: 4 MMOL/L — SIGNIFICANT CHANGE UP (ref 3.5–5.3)
POTASSIUM SERPL-SCNC: 4 MMOL/L — SIGNIFICANT CHANGE UP (ref 3.5–5.3)
PROTHROM AB SERPL-ACNC: 11.6 SEC — SIGNIFICANT CHANGE UP (ref 10.6–13.6)
PROTHROM AB SERPL-ACNC: 11.7 SEC — SIGNIFICANT CHANGE UP (ref 10.6–13.6)
RBC # BLD: 2.86 M/UL — LOW (ref 3.8–5.2)
RBC # BLD: 2.97 M/UL — LOW (ref 3.8–5.2)
RBC # BLD: 3 M/UL — LOW (ref 3.8–5.2)
RBC # FLD: 16.6 % — HIGH (ref 10.3–14.5)
RBC # FLD: 16.7 % — HIGH (ref 10.3–14.5)
RBC # FLD: 16.8 % — HIGH (ref 10.3–14.5)
SODIUM SERPL-SCNC: 142 MMOL/L — SIGNIFICANT CHANGE UP (ref 135–145)
SODIUM SERPL-SCNC: 144 MMOL/L — SIGNIFICANT CHANGE UP (ref 135–145)
SPECIMEN SOURCE: SIGNIFICANT CHANGE UP
WBC # BLD: 13.24 K/UL — HIGH (ref 3.8–10.5)
WBC # BLD: 13.71 K/UL — HIGH (ref 3.8–10.5)
WBC # BLD: 14.64 K/UL — HIGH (ref 3.8–10.5)
WBC # FLD AUTO: 13.24 K/UL — HIGH (ref 3.8–10.5)
WBC # FLD AUTO: 13.71 K/UL — HIGH (ref 3.8–10.5)
WBC # FLD AUTO: 14.64 K/UL — HIGH (ref 3.8–10.5)

## 2020-07-30 PROCEDURE — 99223 1ST HOSP IP/OBS HIGH 75: CPT

## 2020-07-30 RX ADMIN — SODIUM CHLORIDE 125 MILLILITER(S): 9 INJECTION, SOLUTION INTRAVENOUS at 06:28

## 2020-07-30 RX ADMIN — Medication 975 MILLIGRAM(S): at 12:53

## 2020-07-30 NOTE — PROGRESS NOTE ADULT - ATTENDING COMMENTS
Patient seen this morning, still feels slightly weak upon sitting up but is fine laying down, no other complaints including abdominal pain. Abdomen tender only to deep palpation, consistent with current hemoperitoneum and recent surgery. Reviewed all labs, imaging, and history on this admission. Impression that patient likely had intra-peritoneal bleed prior to presentation, but at this time given stable CBC's, normal vitals and UOP, and patient's improved symptoms there does not appear to be any continued bleeding. Prior to most recent CBC result that was stable, had extensive conversation with patient using [place]  ID#[id number] including the possibility of re-operating if H&H continue to drop or her status deteriorates. Reviewed that in this case plan would be for diagnostic laparoscopy, evacuation of hemoperitoneum, correction of active bleeding, repair of damaged tissue, and possible laparotomy. Reviewed the risks and benefits of this procedure. After having this discussion, CBC resulted and patient was informed that we would not recommend surgery at this time but cannot rule out the possibility alter if CBC starts to drop again.     Will monitor inpatient with serial CBC's, if consistently stable and patient able to ambulate, possibility of discharge tomorrow. Regarding consideration of IR drainage previously, would not recommend this at this time as if bleeding has stopped, would not want to risk further bleeding. Patient is largely asymptomatic from stable hemoperitoneum and it should re-absorb on its own over time, risk of drainage would outweigh benefit.    Kevin Wilkins MD

## 2020-07-30 NOTE — PROGRESS NOTE ADULT - ASSESSMENT
A/P: 65y admitted POD#1 s/p b/l oophorectomy at OSH initially presented hypotensive, tachycardic, received 1L IVF and 2uPRBC in ED with improvement, found to have large hemoperitoneum but no active bleeding on CTA Abdomen/Pelvis. Pt with downtrending but stable H/H after 2uPRBC so 1 additional unit PRBC was given. She is now HD#2/POD#2, clinically and hemodynamically stable overnight, pending possible IR drainage. 65y now POD#2 s/p b/l oophorectomy at OSH initially presented POD#1 hypotensive, tachycardic, received 1L IVF and 2uPRBC in ED with improvement, found to have large hemoperitoneum but no active bleeding on CTA Abdomen/Pelvis. Pt with downtrending but stable H/H after 2uPRBC so 1 additional unit PRBC was given. She is now HD#2 clinically and hemodynamically stable overnight.

## 2020-07-30 NOTE — PROGRESS NOTE ADULT - SUBJECTIVE AND OBJECTIVE BOX
R2 GYN Progress Note HD#2     ID# [id number]    Patient seen and examined at bedside.  No acute complaints.  Pt denies pain.   She did not get out of bed last night  Patient is passing flatus.    Abraham is in place.   Denies CP, SOB, N/V, fevers, and chills.    Vital Signs Last 24 Hours  T(C): 36.9 (07-30-20 @ 05:54), Max: 37.9 (07-29-20 @ 13:00)  HR: 77 (07-30-20 @ 05:54) (74 - 133)  BP: 108/69 (07-30-20 @ 05:54) (73/54 - 141/84)  RR: 18 (07-30-20 @ 05:54) (16 - 24)  SpO2: 96% (07-30-20 @ 05:54) (94% - 100%)    I&O's Summary    29 Jul 2020 07:01  -  30 Jul 2020 06:50  --------------------------------------------------------  IN: 1895 mL / OUT: 2600 mL / NET: -705 mL        Physical Exam:  General: NAD  CV: RR  Lungs: CTA b/l  Abdomen: soft, nontender, nondistended, + BS, diffuse ecchymosis in lower abdomen  Incision: laparoscopic skin incisions c/d/i  Ext: No pain or swelling     Labs:                        10.2   19.15 )-----------( 160      ( 29 Jul 2020 19:06 )             30.4   baso x      eos x      imm gran x      lymph x      mono x      poly x                            7.8    14.82 )-----------( 152      ( 29 Jul 2020 14:41 )             23.7   baso 0.1    eos 0.0    imm gran 1.1    lymph 8.7    mono 7.9    poly 82.2                         8.0    21.80 )-----------( 246      ( 29 Jul 2020 12:43 )             24.0   baso 0.0    eos 0.0    imm gran x      lymph 1.7    mono 0.9    poly 95.6       MEDICATIONS  (STANDING):  acetaminophen   Tablet .. 975 milliGRAM(s) Oral every 6 hours  dextrose 5%. 1000 milliLiter(s) (50 mL/Hr) IV Continuous <Continuous>  dextrose 50% Injectable 12.5 Gram(s) IV Push once  dextrose 50% Injectable 25 Gram(s) IV Push once  dextrose 50% Injectable 25 Gram(s) IV Push once  insulin lispro (HumaLOG) corrective regimen sliding scale   SubCutaneous every 6 hours  lactated ringers. 1000 milliLiter(s) (125 mL/Hr) IV Continuous <Continuous>  pantoprazole  Injectable 40 milliGRAM(s) IV Push once    MEDICATIONS  (PRN):  dextrose 40% Gel 15 Gram(s) Oral once PRN Blood Glucose LESS THAN 70 milliGRAM(s)/deciliter  glucagon  Injectable 1 milliGRAM(s) IntraMuscular once PRN Glucose LESS THAN 70 milligrams/deciliter  oxyCODONE    IR 5 milliGRAM(s) Oral every 6 hours PRN Severe Pain (7 - 10) R2 GYN Progress Note HD#2     ID# [id number]    Patient seen and examined at bedside.  No acute complaints.  Pt denies pain.   She did not get out of bed last night  Patient is passing flatus.    Abraham is in place.   Denies CP, SOB, N/V, fevers, and chills.    Vital Signs Last 24 Hours  T(C): 36.9 (07-30-20 @ 05:54), Max: 37.9 (07-29-20 @ 13:00)  HR: 77 (07-30-20 @ 05:54) (74 - 133)  BP: 108/69 (07-30-20 @ 05:54) (73/54 - 141/84)  RR: 18 (07-30-20 @ 05:54) (16 - 24)  SpO2: 96% (07-30-20 @ 05:54) (94% - 100%)    I&O's Summary    29 Jul 2020 07:01  -  30 Jul 2020 06:50  --------------------------------------------------------  IN: 1895 mL / OUT: 2600 mL / NET: -705 mL        Physical Exam:  General: NAD  CV: RR  Lungs: CTA b/l  Abdomen: soft, tender to deep palpation, nondistended, + BS, diffuse ecchymosis in lower abdomen  Incision: laparoscopic skin incisions c/d/i  Ext: No pain or swelling                9.0<L>  14.64<H> )-----------( 137<L>    ( 07-30 @ 09:41 )             27.3<L>               8.8<L>  13.71<H> )-----------( 136<L>    ( 07-30 @ 07:07 )             26.0<L>               10.2<L>  19.15<H> )-----------( 160      ( 07-29 @ 19:06 )             30.4<L>               7.8<L>  14.82<H> )-----------( 152      ( 07-29 @ 14:41 )             23.7<L>               8.0<L>  21.80<H> )-----------( 246      ( 07-29 @ 12:43 )             24.0<L>      MEDICATIONS  (STANDING):  acetaminophen   Tablet .. 975 milliGRAM(s) Oral every 6 hours  dextrose 5%. 1000 milliLiter(s) (50 mL/Hr) IV Continuous <Continuous>  dextrose 50% Injectable 12.5 Gram(s) IV Push once  dextrose 50% Injectable 25 Gram(s) IV Push once  dextrose 50% Injectable 25 Gram(s) IV Push once  insulin lispro (HumaLOG) corrective regimen sliding scale   SubCutaneous every 6 hours  lactated ringers. 1000 milliLiter(s) (125 mL/Hr) IV Continuous <Continuous>  pantoprazole  Injectable 40 milliGRAM(s) IV Push once    MEDICATIONS  (PRN):  dextrose 40% Gel 15 Gram(s) Oral once PRN Blood Glucose LESS THAN 70 milliGRAM(s)/deciliter  glucagon  Injectable 1 milliGRAM(s) IntraMuscular once PRN Glucose LESS THAN 70 milligrams/deciliter  oxyCODONE    IR 5 milliGRAM(s) Oral every 6 hours PRN Severe Pain (7 - 10)

## 2020-07-30 NOTE — CHART NOTE - NSCHARTNOTEFT_GEN_A_CORE
H&H stable throughout day, vitals still normal and UOP adequate. Will continue to monitor overnight and check CBC again in AM, clinically appears to be no active bleed at this time.    Kevin Wilkins MD

## 2020-07-30 NOTE — PROGRESS NOTE ADULT - PROBLEM SELECTOR PLAN 1
Neuro: pain well controlled; continue Tylenol and Oxy PRN  CV: Hemodynamically stable; s/p 3uPRBC, f/u AM labs  Pulm: Saturating well on room air, encourage oob/amb  GI: NPO for possible IR procedure  : UOP adequate; continue elias   Heme: c/w SCDs for DVT ppx  - s/p 3uPRBC; f/u AM CBC  FEN: LR@125.  replete electrolytes prn   ID: Afebrile; trend leukocystosis  Endo: No active issues   Dispo: pending IR drainage of large hemoperitoneum    PEDRO Mccall PGY-2 Neuro: pain well controlled; continue Tylenol and Oxy PRN  CV: Hemodynamically stable; s/p 3uPRBC, f/u AM labs  - PMH HTN; BPs controlled off medication; will ctm  Pulm: Saturating well on room air, encourage oob/amb  GI: NPO for possible IR procedure  - protonix for hx of GERD  : UOP adequate; continue elias   Heme: c/w SCDs for DVT ppx  - s/p 3uPRBC; f/u AM CBC  FEN: LR@125.  replete electrolytes prn   ID: Afebrile; trend leukocystosis  Endo: No active issues   Dispo: pending IR drainage of large hemoperitoneum    PEDRO Mccall PGY-2 Neuro: pain well controlled; continue Tylenol and Oxy PRN  CV: Hemodynamically stable; s/p 3uPRBC. Drop in H&H this AM but upon repeat 2 hours later still stable. Will follows serial CBC's  - PMH HTN; BPs controlled off medication; will ctm  Pulm: Saturating well on room air, encourage oob/amb  GI: NPO until at least next CBC  - protonix for hx of GERD  : UOP adequate; continue elias for close monitoring  Heme: c/w SCDs for DVT ppx  FEN: LR@125.  replete electrolytes prn   ID: Afebrile; trend leukocystosis  Endo: No active issues   Dispo: continue inpatient monitoring    CPooja Mccall PGY-2

## 2020-07-31 ENCOUNTER — TRANSCRIPTION ENCOUNTER (OUTPATIENT)
Age: 65
End: 2020-07-31

## 2020-07-31 VITALS
HEART RATE: 92 BPM | TEMPERATURE: 98 F | OXYGEN SATURATION: 97 % | RESPIRATION RATE: 18 BRPM | SYSTOLIC BLOOD PRESSURE: 133 MMHG | DIASTOLIC BLOOD PRESSURE: 84 MMHG

## 2020-07-31 LAB
APTT BLD: 23.4 SEC — LOW (ref 27.5–35.5)
BASOPHILS # BLD AUTO: 0.02 K/UL — SIGNIFICANT CHANGE UP (ref 0–0.2)
BASOPHILS # BLD AUTO: 0.02 K/UL — SIGNIFICANT CHANGE UP (ref 0–0.2)
BASOPHILS NFR BLD AUTO: 0.2 % — SIGNIFICANT CHANGE UP (ref 0–2)
BASOPHILS NFR BLD AUTO: 0.2 % — SIGNIFICANT CHANGE UP (ref 0–2)
EOSINOPHIL # BLD AUTO: 0.04 K/UL — SIGNIFICANT CHANGE UP (ref 0–0.5)
EOSINOPHIL # BLD AUTO: 0.05 K/UL — SIGNIFICANT CHANGE UP (ref 0–0.5)
EOSINOPHIL NFR BLD AUTO: 0.5 % — SIGNIFICANT CHANGE UP (ref 0–6)
EOSINOPHIL NFR BLD AUTO: 0.6 % — SIGNIFICANT CHANGE UP (ref 0–6)
FIBRINOGEN PPP-MCNC: 432 MG/DL — SIGNIFICANT CHANGE UP (ref 350–510)
GLUCOSE BLDC GLUCOMTR-MCNC: 108 MG/DL — HIGH (ref 70–99)
GLUCOSE BLDC GLUCOMTR-MCNC: 84 MG/DL — SIGNIFICANT CHANGE UP (ref 70–99)
GLUCOSE BLDC GLUCOMTR-MCNC: 87 MG/DL — SIGNIFICANT CHANGE UP (ref 70–99)
HCT VFR BLD CALC: 24 % — LOW (ref 34.5–45)
HCT VFR BLD CALC: 25.2 % — LOW (ref 34.5–45)
HCT VFR BLD CALC: 27 % — LOW (ref 34.5–45)
HGB BLD-MCNC: 7.7 G/DL — LOW (ref 11.5–15.5)
HGB BLD-MCNC: 8.2 G/DL — LOW (ref 11.5–15.5)
HGB BLD-MCNC: 8.6 G/DL — LOW (ref 11.5–15.5)
IMM GRANULOCYTES NFR BLD AUTO: 0.7 % — SIGNIFICANT CHANGE UP (ref 0–1.5)
IMM GRANULOCYTES NFR BLD AUTO: 1.3 % — SIGNIFICANT CHANGE UP (ref 0–1.5)
INR BLD: 0.93 RATIO — SIGNIFICANT CHANGE UP (ref 0.88–1.16)
LYMPHOCYTES # BLD AUTO: 2.25 K/UL — SIGNIFICANT CHANGE UP (ref 1–3.3)
LYMPHOCYTES # BLD AUTO: 2.46 K/UL — SIGNIFICANT CHANGE UP (ref 1–3.3)
LYMPHOCYTES # BLD AUTO: 26.6 % — SIGNIFICANT CHANGE UP (ref 13–44)
LYMPHOCYTES # BLD AUTO: 28 % — SIGNIFICANT CHANGE UP (ref 13–44)
MCHC RBC-ENTMCNC: 29.9 PG — SIGNIFICANT CHANGE UP (ref 27–34)
MCHC RBC-ENTMCNC: 30.3 PG — SIGNIFICANT CHANGE UP (ref 27–34)
MCHC RBC-ENTMCNC: 30.6 PG — SIGNIFICANT CHANGE UP (ref 27–34)
MCHC RBC-ENTMCNC: 31.9 GM/DL — LOW (ref 32–36)
MCHC RBC-ENTMCNC: 32.1 GM/DL — SIGNIFICANT CHANGE UP (ref 32–36)
MCHC RBC-ENTMCNC: 32.5 GM/DL — SIGNIFICANT CHANGE UP (ref 32–36)
MCV RBC AUTO: 93.8 FL — SIGNIFICANT CHANGE UP (ref 80–100)
MCV RBC AUTO: 94 FL — SIGNIFICANT CHANGE UP (ref 80–100)
MCV RBC AUTO: 94.5 FL — SIGNIFICANT CHANGE UP (ref 80–100)
MONOCYTES # BLD AUTO: 0.55 K/UL — SIGNIFICANT CHANGE UP (ref 0–0.9)
MONOCYTES # BLD AUTO: 0.59 K/UL — SIGNIFICANT CHANGE UP (ref 0–0.9)
MONOCYTES NFR BLD AUTO: 6.3 % — SIGNIFICANT CHANGE UP (ref 2–14)
MONOCYTES NFR BLD AUTO: 7 % — SIGNIFICANT CHANGE UP (ref 2–14)
NEUTROPHILS # BLD AUTO: 5.49 K/UL — SIGNIFICANT CHANGE UP (ref 1.8–7.4)
NEUTROPHILS # BLD AUTO: 5.62 K/UL — SIGNIFICANT CHANGE UP (ref 1.8–7.4)
NEUTROPHILS NFR BLD AUTO: 63.7 % — SIGNIFICANT CHANGE UP (ref 43–77)
NEUTROPHILS NFR BLD AUTO: 64.9 % — SIGNIFICANT CHANGE UP (ref 43–77)
NRBC # BLD: 0 /100 WBCS — SIGNIFICANT CHANGE UP (ref 0–0)
PLATELET # BLD AUTO: 133 K/UL — LOW (ref 150–400)
PLATELET # BLD AUTO: 142 K/UL — LOW (ref 150–400)
PLATELET # BLD AUTO: 152 K/UL — SIGNIFICANT CHANGE UP (ref 150–400)
PROTHROM AB SERPL-ACNC: 11.1 SEC — SIGNIFICANT CHANGE UP (ref 10.6–13.6)
RBC # BLD: 2.54 M/UL — LOW (ref 3.8–5.2)
RBC # BLD: 2.68 M/UL — LOW (ref 3.8–5.2)
RBC # BLD: 2.88 M/UL — LOW (ref 3.8–5.2)
RBC # FLD: 16.1 % — HIGH (ref 10.3–14.5)
RBC # FLD: 16.4 % — HIGH (ref 10.3–14.5)
RBC # FLD: 16.6 % — HIGH (ref 10.3–14.5)
WBC # BLD: 7.3 K/UL — SIGNIFICANT CHANGE UP (ref 3.8–10.5)
WBC # BLD: 8.46 K/UL — SIGNIFICANT CHANGE UP (ref 3.8–10.5)
WBC # BLD: 8.8 K/UL — SIGNIFICANT CHANGE UP (ref 3.8–10.5)
WBC # FLD AUTO: 7.3 K/UL — SIGNIFICANT CHANGE UP (ref 3.8–10.5)
WBC # FLD AUTO: 8.46 K/UL — SIGNIFICANT CHANGE UP (ref 3.8–10.5)
WBC # FLD AUTO: 8.8 K/UL — SIGNIFICANT CHANGE UP (ref 3.8–10.5)

## 2020-07-31 PROCEDURE — 81003 URINALYSIS AUTO W/O SCOPE: CPT

## 2020-07-31 PROCEDURE — 96374 THER/PROPH/DIAG INJ IV PUSH: CPT | Mod: XU

## 2020-07-31 PROCEDURE — 80053 COMPREHEN METABOLIC PANEL: CPT

## 2020-07-31 PROCEDURE — 86901 BLOOD TYPING SEROLOGIC RH(D): CPT

## 2020-07-31 PROCEDURE — 86900 BLOOD TYPING SEROLOGIC ABO: CPT

## 2020-07-31 PROCEDURE — 96375 TX/PRO/DX INJ NEW DRUG ADDON: CPT | Mod: XU

## 2020-07-31 PROCEDURE — 74177 CT ABD & PELVIS W/CONTRAST: CPT

## 2020-07-31 PROCEDURE — 82962 GLUCOSE BLOOD TEST: CPT

## 2020-07-31 PROCEDURE — 85610 PROTHROMBIN TIME: CPT

## 2020-07-31 PROCEDURE — 85730 THROMBOPLASTIN TIME PARTIAL: CPT

## 2020-07-31 PROCEDURE — 86850 RBC ANTIBODY SCREEN: CPT

## 2020-07-31 PROCEDURE — 83036 HEMOGLOBIN GLYCOSYLATED A1C: CPT

## 2020-07-31 PROCEDURE — 82803 BLOOD GASES ANY COMBINATION: CPT

## 2020-07-31 PROCEDURE — 51702 INSERT TEMP BLADDER CATH: CPT

## 2020-07-31 PROCEDURE — 82272 OCCULT BLD FECES 1-3 TESTS: CPT

## 2020-07-31 PROCEDURE — 74177 CT ABD & PELVIS W/CONTRAST: CPT | Mod: 26

## 2020-07-31 PROCEDURE — 80048 BASIC METABOLIC PNL TOTAL CA: CPT

## 2020-07-31 PROCEDURE — 82565 ASSAY OF CREATININE: CPT

## 2020-07-31 PROCEDURE — 82435 ASSAY OF BLOOD CHLORIDE: CPT

## 2020-07-31 PROCEDURE — 86922 COMPATIBILITY TEST ANTIGLOB: CPT

## 2020-07-31 PROCEDURE — 83605 ASSAY OF LACTIC ACID: CPT

## 2020-07-31 PROCEDURE — 74174 CTA ABD&PLVS W/CONTRAST: CPT

## 2020-07-31 PROCEDURE — 84132 ASSAY OF SERUM POTASSIUM: CPT

## 2020-07-31 PROCEDURE — 86870 RBC ANTIBODY IDENTIFICATION: CPT

## 2020-07-31 PROCEDURE — 36430 TRANSFUSION BLD/BLD COMPNT: CPT | Mod: XU

## 2020-07-31 PROCEDURE — 99232 SBSQ HOSP IP/OBS MODERATE 35: CPT

## 2020-07-31 PROCEDURE — P9016: CPT

## 2020-07-31 PROCEDURE — 82330 ASSAY OF CALCIUM: CPT

## 2020-07-31 PROCEDURE — 71045 X-RAY EXAM CHEST 1 VIEW: CPT

## 2020-07-31 PROCEDURE — 85027 COMPLETE CBC AUTOMATED: CPT

## 2020-07-31 PROCEDURE — 99291 CRITICAL CARE FIRST HOUR: CPT | Mod: 25

## 2020-07-31 PROCEDURE — 82947 ASSAY GLUCOSE BLOOD QUANT: CPT

## 2020-07-31 PROCEDURE — 87086 URINE CULTURE/COLONY COUNT: CPT

## 2020-07-31 PROCEDURE — 85384 FIBRINOGEN ACTIVITY: CPT

## 2020-07-31 PROCEDURE — 84295 ASSAY OF SERUM SODIUM: CPT

## 2020-07-31 PROCEDURE — 86880 COOMBS TEST DIRECT: CPT

## 2020-07-31 PROCEDURE — 87040 BLOOD CULTURE FOR BACTERIA: CPT

## 2020-07-31 PROCEDURE — 85014 HEMATOCRIT: CPT

## 2020-07-31 PROCEDURE — 86803 HEPATITIS C AB TEST: CPT

## 2020-07-31 NOTE — PROGRESS NOTE ADULT - PROBLEM SELECTOR PLAN 1
Neuro: pain well controlled; continue Tylenol and Oxy PRN  CV: Hemodynamically stable; s/p 3uPRBC and H&H stable yesterday, but drop this morning. Will repeat again in 4 hours, but given drop concern for continued bleeding still present, repeat CTA abdomen/pelvis with and without IV contrast to evaluate for increase in hemoperitoneum or active bleed  - PMH HTN; BPs controlled off medication; will ctm  Pulm: Saturating well on room air, encourage oob/amb  GI: NPO  - protonix for hx of GERD  : UOP adequate; continue elias for close monitoring  Heme: c/w SCDs for DVT ppx  FEN: LR@125.  replete electrolytes prn   ID: Afebrile  Endo: No active issues   Dispo: Will continue to monitor, or if continued drop in H&H and/or increase in hemoperitoneum on CT, possible OR today    PEDRO Mccall, PGY-2

## 2020-07-31 NOTE — CHART NOTE - NSCHARTNOTEFT_GEN_A_CORE
Patient seen and examined. No complaints at this time, no pain at baseline, only with straining, has been improving since surgery. Has been ambulating with no weakness.    Vital Signs Last 24 Hours  T(C): 36.6 (07-31-20 @ 13:43), Max: 37.5 (07-30-20 @ 17:13)  HR: 76 (07-31-20 @ 13:43) (68 - 92)  BP: 125/83 (07-31-20 @ 13:43) (101/66 - 125/83)  RR: 17 (07-31-20 @ 13:43) (17 - 18)  SpO2: 99% (07-31-20 @ 13:43) (94% - 99%)    I&O's Summary    30 Jul 2020 07:01  -  31 Jul 2020 07:00  --------------------------------------------------------  IN: 2000 mL / OUT: 1750 mL / NET: 250 mL    31 Jul 2020 07:01  -  31 Jul 2020 16:18  --------------------------------------------------------  IN: 375 mL / OUT: 600 mL / NET: -225 mL    Physical Exam:  General: NAD  Abdomen: Soft, minimal tenderness, appropriate for post-op, non-distended. Ecchymosis stable< from: CT Abdomen and Pelvis w/ IV Cont (07.31.20 @ 11:58) >  Incision: Port sites CDI               8.2<L>  7.30  )-----------( 152      ( 07-31 @ 15:21 )             25.2<L>               8.6<L>  8.80  )-----------( 142<L>    ( 07-31 @ 10:22 )             27.0<L>               7.7<L>  8.46  )-----------( 133<L>    ( 07-31 @ 05:33 )             24.0<L>               9.0<L>  13.24<H> )-----------( 147<L>    ( 07-30 @ 13:28 )             27.6<L>               9.0<L>  14.64<H> )-----------( 137<L>    ( 07-30 @ 09:41 )             27.3<L>      EXAM:  CT ABDOMEN AND PELVIS IC                            *** ADDENDUM 07/31/2020  ***    Transcription error in the impression of the initial report, which should read as follows: Large volume hemoperitoneum, similar in appearance or minimally increased in size. No active hemorrhage. Unchanged cystic lesion in the lower abdomen, measuring 2.7 cm.    *** END OF ADDENDUM 07/31/2020  ***      PROCEDURE DATE:  07/31/2020        INTERPRETATION:  CLINICAL INFORMATION: Postop day 3. Hypotension and tachycardia. Bilateral oophorectomy.    COMPARISON: July 29, 2020.    PROCEDURE:  CT of the Abdomen and Pelvis was performed with and without intravenous contrast.  Precontrast, Arterial and Delayed phases were performed.  Intravenous contrast: 90ml Omnipaque 350. 10 ml discarded.  Oral contrast: None.  Sagittal and coronal reformats were performed.    FINDINGS:  LOWER CHEST: Within normal limits.    LIVER: Within normal limits.  BILE DUCTS: Normal caliber.  GALLBLADDER: Cholelithiasis.  SPLEEN: Within normal limits.  PANCREAS: Within normal limits.  ADRENALS: Unchanged right adrenal adenoma.  KIDNEYS/URETERS: No hydronephrosis. Right renal cyst.    BLADDER: Contains a Abraham catheter balloon.  REPRODUCTIVE ORGANS: Uterus is within normal limits. Surgical sutures in the region of both adnexa.    BOWEL: Moderate-sized hiatal hernia. No bowel obstruction. Colonic diverticulosis.  PERITONEUM: Large volume hemoperitoneum, similar in appearance or minimally increased in size. No active hemorrhage. Unchanged cystic lesion in the lower abdomen, measuring 2.7 cm.  VESSELS: Atherosclerotic changes.  RETROPERITONEUM/LYMPH NODES: No lymphadenopathy.  ABDOMINAL WALL: Anasarca.  BONES: Degenerative changes.    IMPRESSION:  Since July 29, 2020, large hemoperitoneum is size. No active hemorrhage.      ***Please see the addendum at the top of this report. It may contain additional important information or changes.****      NIURKA ROSE M.D., ATTENDING RADIOLOGIST  This document has been electronically signed. Jul 31 2020  1:51PM  Addend:  NIURKA ROSE M.D., ATTENDING RADIOLOGIST  This addendum was electronically signed on: Jul 31 2020  3:48PM.    < end of copied text >        Assessment:  Patient looks clinically well, has no complaints, vitals and UOP have been normal throughout inpatient stay. H&H trend since yesterday morning effectively stable, small fluctuations in values attributable to changes in hemoconcentration/dilution. Spoke with radiology regarding CT results to further elaborate on read. Dr. Rose reported that it is difficult to say hemoperitoneum is exactly the same as prior as it shifts, but that overall it looked similar with maybe a few areas that were minimally increased, but for a difference of 2 days between scans the increase is likely insignificant.  Plan:  - Patient stable, no signs of continued bleeding over 2 days now, clear for discharge  - Regular diet  - Abraham to be removed, patient may be d/c'ed after she voids    Kevin Wilkins MD

## 2020-07-31 NOTE — PROGRESS NOTE ADULT - ASSESSMENT
65y now POD#3 s/p b/l oophorectomy at OSH initially presented POD#1 hypotensive, tachycardic, received 1L IVF and 2uPRBC in ED with improvement, found to have large hemoperitoneum but no active bleeding on CTA Abdomen/Pelvis. Pt with downtrending but stable H/H after 2uPRBC so 1 additional unit PRBC was given. She is now HD#3 clinically and hemodynamically stable overnight with stable H&H yesterday, but now with drop in H&H again this morning despite patient reporting improved symptoms and exam being improved.

## 2020-07-31 NOTE — PROGRESS NOTE ADULT - SUBJECTIVE AND OBJECTIVE BOX
R2 GYN Progress Note  HD#3, POD#3 (OSH)    Patient seen and examined at bedside.  No acute events overnight. No acute complaints.  Pain well controlled.  Patient is ambulating and tolerating regular diet (has been NPO since midnight)  Has not yet passed flatus vs. Patient is passing flatus.    Patient is voiding spontaneously vs. Elias is still in place.   Denies CP, SOB, N/V, fevers, and chills.    Vital Signs Last 24 Hours  T(C): 37.1 (07-31-20 @ 08:19), Max: 37.5 (07-30-20 @ 17:13)  HR: 89 (07-31-20 @ 08:19) (68 - 92)  BP: 114/80 (07-31-20 @ 08:19) (101/66 - 117/73)  RR: 18 (07-31-20 @ 08:19) (18 - 18)  SpO2: 97% (07-31-20 @ 08:19) (94% - 99%)    I&O's Summary    30 Jul 2020 07:01  -  31 Jul 2020 07:00  --------------------------------------------------------  IN: 2000 mL / OUT: 1750 mL / NET: 250 mL        Physical Exam:  General: NAD  CV: RR  Lungs: CTA b/l  Abdomen: Soft, appropriately-tender, softly distended, tympanic, normoactive bowel sounds  Incision: _ CDI  : no bleeding on pad  Ext: No pain or swelling     Labs:                        7.7    8.46  )-----------( 133      ( 31 Jul 2020 05:33 )             24.0   baso 0.2    eos 0.6    imm gran 0.7    lymph 26.6   mono 7.0    poly 64.9                         9.0    13.24 )-----------( 147      ( 30 Jul 2020 13:28 )             27.6   baso x      eos x      imm gran x      lymph x      mono x      poly x                            9.0    14.64 )-----------( 137      ( 30 Jul 2020 09:41 )             27.3   baso x      eos x      imm gran x      lymph x      mono x      poly x                            8.8    13.71 )-----------( 136      ( 30 Jul 2020 07:07 )             26.0   baso 0.1    eos 0.0    imm gran 0.7    lymph 17.4   mono 7.1    poly 74.7                         10.2   19.15 )-----------( 160      ( 29 Jul 2020 19:06 )             30.4   baso x      eos x      imm gran x      lymph x      mono x      poly x                            7.8    14.82 )-----------( 152      ( 29 Jul 2020 14:41 )             23.7   baso 0.1    eos 0.0    imm gran 1.1    lymph 8.7    mono 7.9    poly 82.2                         8.0    21.80 )-----------( 246      ( 29 Jul 2020 12:43 )             24.0   baso 0.0    eos 0.0    imm gran x      lymph 1.7    mono 0.9    poly 95.6       MEDICATIONS  (STANDING):  acetaminophen   Tablet .. 975 milliGRAM(s) Oral every 6 hours  dextrose 5%. 1000 milliLiter(s) (50 mL/Hr) IV Continuous <Continuous>  dextrose 50% Injectable 12.5 Gram(s) IV Push once  dextrose 50% Injectable 25 Gram(s) IV Push once  dextrose 50% Injectable 25 Gram(s) IV Push once  insulin lispro (HumaLOG) corrective regimen sliding scale   SubCutaneous every 6 hours  lactated ringers. 1000 milliLiter(s) (125 mL/Hr) IV Continuous <Continuous>  pantoprazole  Injectable 40 milliGRAM(s) IV Push once    MEDICATIONS  (PRN):  dextrose 40% Gel 15 Gram(s) Oral once PRN Blood Glucose LESS THAN 70 milliGRAM(s)/deciliter  glucagon  Injectable 1 milliGRAM(s) IntraMuscular once PRN Glucose LESS THAN 70 milligrams/deciliter  oxyCODONE    IR 5 milliGRAM(s) Oral every 6 hours PRN Severe Pain (7 - 10)      A/P: 65y POD#   s/p              .  Patient is stable and doing well.      Neuro: PO pain meds.   CV: Hemodynamically stable  Pulm: Saturating well on room air, encourage oob/amb  GI: Advance to regular diet vs. Continue regular diet  : UOP adequate, d/c elias   vs. Voiding spontaneously  Heme: c/w HSQ and SCDs for DVT ppx  FEN: LR@125.  replete electrolytes prn   ID: Afebrile  Endo: No active issues   Dispo: Continue routine post-op care    PEDRO Mccall PGY-2 R2 GYN Progress Note  HD#3, POD#3 (OSH)     #: [id number] (Rasheeda)    Patient seen and examined at bedside.  No acute events overnight. No acute complaints. Denies abdominal pain, only complaining of muscular pain.  Patient is ambulating and tolerating regular diet (has been NPO since midnight). Denies weakness.  Abraham in place  Denies CP, SOB, N/V, fevers, and chills.    Vital Signs Last 24 Hours  T(C): 37.1 (07-31-20 @ 08:19), Max: 37.5 (07-30-20 @ 17:13)  HR: 89 (07-31-20 @ 08:19) (68 - 92)  BP: 114/80 (07-31-20 @ 08:19) (101/66 - 117/73)  RR: 18 (07-31-20 @ 08:19) (18 - 18)  SpO2: 97% (07-31-20 @ 08:19) (94% - 99%)    I&O's Summary    30 Jul 2020 07:01  -  31 Jul 2020 07:00  --------------------------------------------------------  IN: 2000 mL / OUT: 1750 mL / NET: 250 mL        Physical Exam:  General: NAD  CV: NR, RR, S1, S2, no M/R/G  Lungs: CTA b/l  Abdomen: Soft, nondistended, nontender, +BS  Incision: laparoscopic incision sites c/d/i w/ surrounding ecchymosis stable from yesterday  Ext: No pain or swelling    Labs:                        7.7    8.46  )-----------( 133      ( 31 Jul 2020 05:33 )             24.0   baso 0.2    eos 0.6    imm gran 0.7    lymph 26.6   mono 7.0    poly 64.9                         9.0    13.24 )-----------( 147      ( 30 Jul 2020 13:28 )             27.6   baso x      eos x      imm gran x      lymph x      mono x      poly x                            9.0    14.64 )-----------( 137      ( 30 Jul 2020 09:41 )             27.3   baso x      eos x      imm gran x      lymph x      mono x      poly x                            8.8    13.71 )-----------( 136      ( 30 Jul 2020 07:07 )             26.0   baso 0.1    eos 0.0    imm gran 0.7    lymph 17.4   mono 7.1    poly 74.7                         10.2   19.15 )-----------( 160      ( 29 Jul 2020 19:06 )             30.4   baso x      eos x      imm gran x      lymph x      mono x      poly x                            7.8    14.82 )-----------( 152      ( 29 Jul 2020 14:41 )             23.7   baso 0.1    eos 0.0    imm gran 1.1    lymph 8.7    mono 7.9    poly 82.2                         8.0    21.80 )-----------( 246      ( 29 Jul 2020 12:43 )             24.0   baso 0.0    eos 0.0    imm gran x      lymph 1.7    mono 0.9    poly 95.6       MEDICATIONS  (STANDING):  acetaminophen   Tablet .. 975 milliGRAM(s) Oral every 6 hours  dextrose 5%. 1000 milliLiter(s) (50 mL/Hr) IV Continuous <Continuous>  dextrose 50% Injectable 12.5 Gram(s) IV Push once  dextrose 50% Injectable 25 Gram(s) IV Push once  dextrose 50% Injectable 25 Gram(s) IV Push once  insulin lispro (HumaLOG) corrective regimen sliding scale   SubCutaneous every 6 hours  lactated ringers. 1000 milliLiter(s) (125 mL/Hr) IV Continuous <Continuous>  pantoprazole  Injectable 40 milliGRAM(s) IV Push once    MEDICATIONS  (PRN):  dextrose 40% Gel 15 Gram(s) Oral once PRN Blood Glucose LESS THAN 70 milliGRAM(s)/deciliter  glucagon  Injectable 1 milliGRAM(s) IntraMuscular once PRN Glucose LESS THAN 70 milligrams/deciliter  oxyCODONE    IR 5 milliGRAM(s) Oral every 6 hours PRN Severe Pain (7 - 10)

## 2020-07-31 NOTE — DISCHARGE NOTE PROVIDER - HOSPITAL COURSE
65 year old s/p b/l oophorectomy at OSH presented POD#1 hypotensive, tachycardic for which she needed to receive a blood transfusion of 2uPRBC in ED. Patients vital signs improved. CT of the abdomen and pelvis showed large hemoperitoneum but no active bleeding. Received an additional 1 unit of PRBC as H/H was downtrending. On POD#3 repeat CT showed a hemoperitoneum approximately the same size without any active bleeding. H&H stable. Patient was discharged safely and was able to ambulate, tolerate PO, void and pain was controlled. 65 year old s/p b/l oophorectomy at OSH presented POD#1 hypotensive, tachycardic for which she needed to receive a blood transfusion of 2uPRBC in ED. Patients vital signs improved. CT of the abdomen and pelvis showed large hemoperitoneum but no active bleeding. Received an additional 1 unit of PRBC as H/H was downtrending. On POD#3 repeat CT showed a hemoperitoneum approximately the same size without any active bleeding. H&H stable. Patient was discharged safely and was able to ambulate, tolerate PO, void and pain was controlled.          Hct trend:24.0->2 units of pRBCs ->23.7->1 unit of pRBCs->30.4->26.0 ->27.3->27.6->24.0->27.0->25.2 65y presented POD#1 s/p b/l oophorectomy at OSH hypotensive, tachycardic, received 1L IVF and 2uPRBC in ED with improvement, found to have large hemoperitoneum but no active bleeding on CTA Abdomen/Pelvis. Pt with downtrending but stable H/H after 2uPRBC so 1 additional unit PRBC was given. Pt remainder hemodynamically stable throughout hospital stay. On HD#3 CTA Abdomen/Pelvis was repeated which demonstrated stable hemoperitoneum approximately the same size with no active hemorrhage. She was discharged on HD#3/POD#3 in stable conditions with normal vital signs, tolerating diet, voiding spontaneously. She will have close follow-up  with her surgeon Dr. Woo at [facility].         Hct trend:24.0->2 units of pRBCs ->23.7->1 unit of pRBCs->30.4->26.0 ->27.3->27.6->24.0->27.0->25.2

## 2020-07-31 NOTE — DISCHARGE NOTE PROVIDER - NSDCMRMEDTOKEN_GEN_ALL_CORE_FT
adalimumab 40 mg/0.4 mL subcutaneous kit: 40 milligram(s) subcutaneous every other week  amLODIPine 5 mg oral tablet: 1 tab(s) orally once a day  aspirin 81 mg oral tablet: orally once a day  atorvastatin 10 mg oral tablet: 1 tab(s) orally once a day  esomeprazole 40 mg oral delayed release capsule: 1 cap(s) orally once a day  folic acid 1 mg oral tablet: 1 tab(s) orally once a day  metFORMIN 500 mg oral tablet: 1 tab(s) orally 2 times a day  Methotrexate Sodium, Preservative Free:

## 2020-07-31 NOTE — PROGRESS NOTE ADULT - PROBLEM SELECTOR PLAN 1
Neuro: pain well controlled; continue Tylenol and Oxy PRN  CV: Hemodynamically stable; s/p 3uPRBC and H&H stable yesterday, but drop this morning. Will repeat again in 4 hours, but given drop concern for continued bleeding still present, repeat CTA abdomen/pelvis with and without IV contrast to evaluate for increase in hemoperitoneum or active bleed  - PMH HTN; BPs controlled off medication; will ctm  Pulm: Saturating well on room air, encourage oob/amb  GI: NPO  - protonix for hx of GERD  : UOP adequate; continue elias for close monitoring  Heme: c/w SCDs for DVT ppx  FEN: LR@125.  replete electrolytes prn   ID: Afebrile  Endo: No active issues   Dispo: Will continue to monitor, or if continued drop in H&H and/or increase in hemoperitoneum on CT, may need to go to OR today    Kevin Wilkins MD

## 2020-07-31 NOTE — DISCHARGE NOTE PROVIDER - NSFOLLOWUPCLINICS_GEN_ALL_ED_FT
[facility]  Gynceology/OB  [address] [place] [address]  Phone: [phone]  Fax:   Follow Up Time: 2 weeks

## 2020-07-31 NOTE — DISCHARGE NOTE PROVIDER - CARE PROVIDER_API CALL
[facility],   [address] [place] [address]  Phone: [phone]  Fax: (   )    -  Follow Up Time:

## 2020-07-31 NOTE — PROGRESS NOTE ADULT - SUBJECTIVE AND OBJECTIVE BOX
Patient seen and examined at bedside, no acute overnight events. H&H was stable yesterday. No acute complaints, pain well controlled. Patient no longer feeling week, ambulated yesterday without issue, voiding spontaneously, tolerated diet prior to midnight. Denies CP, SOB, N/V, fevers, and chills.    Vital Signs Last 24 Hours  T(C): 37.2 (07-31-20 @ 05:06), Max: 37.5 (07-30-20 @ 17:13)  HR: 79 (07-31-20 @ 05:06) (68 - 92)  BP: 117/73 (07-31-20 @ 05:06) (101/66 - 117/73)  RR: 18 (07-31-20 @ 05:06) (18 - 18)  SpO2: 94% (07-31-20 @ 05:06) (94% - 99%)    I&O's Detail    30 Jul 2020 07:01  -  31 Jul 2020 07:00  --------------------------------------------------------  IN:    lactated ringers.: 1500 mL    Oral Fluid: 500 mL  Total IN: 2000 mL    OUT:    Indwelling Catheter - Urethral: 1750 mL  Total OUT: 1750 mL    Total NET: 250 mL    Physical Exam:  General: NAD  CV: NR, RR, S1, S2, no M/R/G  Lungs: CTA-B  Abdomen: Soft, minimal tenderness to deep palpation, improved from yesterday, non-distended  Incision: Port sites CDI, surrounding echymosis stable from yesterday  Ext: No pain or swelling    Labs:             7.7<L>  8.46  )-----------( 133<L>    ( 07-31 @ 05:33 )             24.0<L>               9.0<L>  13.24<H> )-----------( 147<L>    ( 07-30 @ 13:28 )             27.6<L>               9.0<L>  14.64<H> )-----------( 137<L>    ( 07-30 @ 09:41 )             27.3<L>               8.8<L>  13.71<H> )-----------( 136<L>    ( 07-30 @ 07:07 )             26.0<L>               10.2<L>  19.15<H> )-----------( 160      ( 07-29 @ 19:06 )             30.4<L>        MEDICATIONS  (STANDING):  acetaminophen   Tablet .. 975 milliGRAM(s) Oral every 6 hours  dextrose 5%. 1000 milliLiter(s) (50 mL/Hr) IV Continuous <Continuous>  dextrose 50% Injectable 12.5 Gram(s) IV Push once  dextrose 50% Injectable 25 Gram(s) IV Push once  dextrose 50% Injectable 25 Gram(s) IV Push once  insulin lispro (HumaLOG) corrective regimen sliding scale   SubCutaneous every 6 hours  lactated ringers. 1000 milliLiter(s) (125 mL/Hr) IV Continuous <Continuous>  pantoprazole  Injectable 40 milliGRAM(s) IV Push once    MEDICATIONS  (PRN):  dextrose 40% Gel 15 Gram(s) Oral once PRN Blood Glucose LESS THAN 70 milliGRAM(s)/deciliter  glucagon  Injectable 1 milliGRAM(s) IntraMuscular once PRN Glucose LESS THAN 70 milligrams/deciliter  oxyCODONE    IR 5 milliGRAM(s) Oral every 6 hours PRN Severe Pain (7 - 10)

## 2020-07-31 NOTE — DISCHARGE NOTE PROVIDER - NSDCFUADDAPPT_GEN_ALL_CORE_FT
preop op labs with mild anemia but adequate   Will proceed to cath given stress echo findings then discuss work up for anemia  K elevated so reduce Losartan to 50 mg daily Patient to call Dr. Woo (OBGYN that performed the surgery) after discharge.  ([phone])

## 2020-07-31 NOTE — DISCHARGE NOTE PROVIDER - PROVIDER TOKENS
FREE:[LAST:[[facility]],PHONE:[phone]],FAX:[(   )    -],ADDRESS:[[address] [place] [address]]]

## 2020-07-31 NOTE — DISCHARGE NOTE NURSING/CASE MANAGEMENT/SOCIAL WORK - PATIENT PORTAL LINK FT
You can access the FollowMyHealth Patient Portal offered by Health system by registering at the following website: [email or web address]. By joining [facility]’s FollowMyHealth portal, you will also be able to view your health information using other applications (apps) compatible with our system.

## 2020-07-31 NOTE — DISCHARGE NOTE PROVIDER - NSDCFUADDINST_GEN_ALL_CORE_FT
Patient to call Dr. Woo (OBGYN that performed the surgery) after discharge. Return to your regular way of eating.  Resume normal activity as tolerated, but no heavy lifting or strenuous activity for 2 weeks.  No driving for next 2 weeks and/or while on narcotic pain medication.  Complete vaginal rest, no tampons, no douching, no tub bathing, no sexual activities for 2 weeks unless otherwise instructed by your doctor.  Call your doctor with any signs and symptoms of infection such as fever, chills, nausea or vomiting.  Call your doctor with redness or swelling at the incision site, fluid leakage or wound separation.  Call your doctor if you're unable to tolerate food or have difficulty urinating.  Call your doctor if you have pain that is not relieved by your prescribed medications.  Notify your doctor with any other concerns.  Follow up in [place] [facility] office in 2 weeks.

## 2020-08-03 LAB
CULTURE RESULTS: SIGNIFICANT CHANGE UP
CULTURE RESULTS: SIGNIFICANT CHANGE UP
SPECIMEN SOURCE: SIGNIFICANT CHANGE UP
SPECIMEN SOURCE: SIGNIFICANT CHANGE UP

## 2020-08-05 ENCOUNTER — INPATIENT (INPATIENT)
Facility: HOSPITAL | Age: 65
LOS: 3 days | Discharge: HOME | DRG: 920 | End: 2020-08-09
Attending: OBSTETRICS & GYNECOLOGY | Admitting: OBSTETRICS & GYNECOLOGY
Payer: COMMERCIAL

## 2020-08-05 ENCOUNTER — TRANSCRIPTION ENCOUNTER (OUTPATIENT)
Age: 65
End: 2020-08-05

## 2020-08-05 VITALS
SYSTOLIC BLOOD PRESSURE: 138 MMHG | RESPIRATION RATE: 17 BRPM | OXYGEN SATURATION: 97 % | HEIGHT: 66 IN | WEIGHT: 218.04 LBS | TEMPERATURE: 100 F | HEART RATE: 105 BPM | DIASTOLIC BLOOD PRESSURE: 89 MMHG

## 2020-08-05 DIAGNOSIS — E27.8 OTHER SPECIFIED DISORDERS OF ADRENAL GLAND: ICD-10-CM

## 2020-08-05 DIAGNOSIS — Z90.722 ACQUIRED ABSENCE OF OVARIES, BILATERAL: Chronic | ICD-10-CM

## 2020-08-05 DIAGNOSIS — R73.09 OTHER ABNORMAL GLUCOSE: ICD-10-CM

## 2020-08-05 DIAGNOSIS — M06.9 RHEUMATOID ARTHRITIS, UNSPECIFIED: ICD-10-CM

## 2020-08-05 DIAGNOSIS — L02.91 CUTANEOUS ABSCESS, UNSPECIFIED: ICD-10-CM

## 2020-08-05 DIAGNOSIS — K66.1 HEMOPERITONEUM: ICD-10-CM

## 2020-08-05 DIAGNOSIS — R19.7 DIARRHEA, UNSPECIFIED: ICD-10-CM

## 2020-08-05 DIAGNOSIS — E80.6 OTHER DISORDERS OF BILIRUBIN METABOLISM: ICD-10-CM

## 2020-08-05 DIAGNOSIS — E78.5 HYPERLIPIDEMIA, UNSPECIFIED: ICD-10-CM

## 2020-08-05 DIAGNOSIS — I10 ESSENTIAL (PRIMARY) HYPERTENSION: ICD-10-CM

## 2020-08-05 PROBLEM — M19.90 UNSPECIFIED OSTEOARTHRITIS, UNSPECIFIED SITE: Chronic | Status: ACTIVE | Noted: 2020-07-29

## 2020-08-05 LAB
ALBUMIN SERPL ELPH-MCNC: 3.6 G/DL — SIGNIFICANT CHANGE UP (ref 3.3–5)
ALP SERPL-CCNC: 47 U/L — SIGNIFICANT CHANGE UP (ref 40–120)
ALT FLD-CCNC: 16 U/L — SIGNIFICANT CHANGE UP (ref 10–45)
ANION GAP SERPL CALC-SCNC: 14 MMOL/L — SIGNIFICANT CHANGE UP (ref 5–17)
ANTIBODY ID 1_1: SIGNIFICANT CHANGE UP
APPEARANCE UR: ABNORMAL
APTT BLD: 24.6 SEC — LOW (ref 27.5–35.5)
AST SERPL-CCNC: 65 U/L — HIGH (ref 10–40)
BACTERIA # UR AUTO: NEGATIVE — SIGNIFICANT CHANGE UP
BASE EXCESS BLDV CALC-SCNC: 0.3 MMOL/L — SIGNIFICANT CHANGE UP (ref -2–2)
BASOPHILS # BLD AUTO: 0.05 K/UL — SIGNIFICANT CHANGE UP (ref 0–0.2)
BASOPHILS NFR BLD AUTO: 0.4 % — SIGNIFICANT CHANGE UP (ref 0–2)
BILIRUB SERPL-MCNC: 3.2 MG/DL — HIGH (ref 0.2–1.2)
BILIRUB UR-MCNC: NEGATIVE — SIGNIFICANT CHANGE UP
BLD GP AB SCN SERPL QL: POSITIVE — SIGNIFICANT CHANGE UP
BUN SERPL-MCNC: 14 MG/DL — SIGNIFICANT CHANGE UP (ref 7–23)
CA-I SERPL-SCNC: 1.15 MMOL/L — SIGNIFICANT CHANGE UP (ref 1.12–1.3)
CALCIUM SERPL-MCNC: 9.5 MG/DL — SIGNIFICANT CHANGE UP (ref 8.4–10.5)
CHLORIDE BLDV-SCNC: 107 MMOL/L — SIGNIFICANT CHANGE UP (ref 96–108)
CHLORIDE SERPL-SCNC: 101 MMOL/L — SIGNIFICANT CHANGE UP (ref 96–108)
CO2 BLDV-SCNC: 26 MMOL/L — SIGNIFICANT CHANGE UP (ref 22–30)
CO2 SERPL-SCNC: 22 MMOL/L — SIGNIFICANT CHANGE UP (ref 22–31)
COLOR SPEC: YELLOW — SIGNIFICANT CHANGE UP
CREAT SERPL-MCNC: 0.7 MG/DL — SIGNIFICANT CHANGE UP (ref 0.5–1.3)
DAT POLY-SP REAG RBC QL: NEGATIVE — SIGNIFICANT CHANGE UP
DIFF PNL FLD: ABNORMAL
EOSINOPHIL # BLD AUTO: 0.1 K/UL — SIGNIFICANT CHANGE UP (ref 0–0.5)
EOSINOPHIL NFR BLD AUTO: 0.8 % — SIGNIFICANT CHANGE UP (ref 0–6)
EPI CELLS # UR: 3 /HPF — SIGNIFICANT CHANGE UP
GAS PNL BLDV: 134 MMOL/L — LOW (ref 135–145)
GAS PNL BLDV: SIGNIFICANT CHANGE UP
GAS PNL BLDV: SIGNIFICANT CHANGE UP
GLUCOSE BLDV-MCNC: 131 MG/DL — HIGH (ref 70–99)
GLUCOSE SERPL-MCNC: 141 MG/DL — HIGH (ref 70–99)
GLUCOSE UR QL: NEGATIVE — SIGNIFICANT CHANGE UP
HCO3 BLDV-SCNC: 24 MMOL/L — SIGNIFICANT CHANGE UP (ref 21–29)
HCT VFR BLD CALC: 34 % — LOW (ref 34.5–45)
HCT VFR BLDA CALC: 35 % — LOW (ref 39–50)
HGB BLD CALC-MCNC: 11.4 G/DL — LOW (ref 11.5–15.5)
HGB BLD-MCNC: 10.9 G/DL — LOW (ref 11.5–15.5)
HYALINE CASTS # UR AUTO: 3 /LPF — HIGH (ref 0–2)
IMM GRANULOCYTES NFR BLD AUTO: 1.3 % — SIGNIFICANT CHANGE UP (ref 0–1.5)
INR BLD: 1.04 RATIO — SIGNIFICANT CHANGE UP (ref 0.88–1.16)
KETONES UR-MCNC: NEGATIVE — SIGNIFICANT CHANGE UP
LACTATE BLDV-MCNC: 1.5 MMOL/L — SIGNIFICANT CHANGE UP (ref 0.7–2)
LEUKOCYTE ESTERASE UR-ACNC: ABNORMAL
LYMPHOCYTES # BLD AUTO: 1.78 K/UL — SIGNIFICANT CHANGE UP (ref 1–3.3)
LYMPHOCYTES # BLD AUTO: 13.6 % — SIGNIFICANT CHANGE UP (ref 13–44)
MCHC RBC-ENTMCNC: 30.5 PG — SIGNIFICANT CHANGE UP (ref 27–34)
MCHC RBC-ENTMCNC: 32.1 GM/DL — SIGNIFICANT CHANGE UP (ref 32–36)
MCV RBC AUTO: 95.2 FL — SIGNIFICANT CHANGE UP (ref 80–100)
MONOCYTES # BLD AUTO: 1.35 K/UL — HIGH (ref 0–0.9)
MONOCYTES NFR BLD AUTO: 10.3 % — SIGNIFICANT CHANGE UP (ref 2–14)
NEUTROPHILS # BLD AUTO: 9.63 K/UL — HIGH (ref 1.8–7.4)
NEUTROPHILS NFR BLD AUTO: 73.6 % — SIGNIFICANT CHANGE UP (ref 43–77)
NITRITE UR-MCNC: NEGATIVE — SIGNIFICANT CHANGE UP
NRBC # BLD: 0 /100 WBCS — SIGNIFICANT CHANGE UP (ref 0–0)
OB PNL STL: NEGATIVE — SIGNIFICANT CHANGE UP
PCO2 BLDV: 39 MMHG — SIGNIFICANT CHANGE UP (ref 35–50)
PH BLDV: 7.41 — SIGNIFICANT CHANGE UP (ref 7.35–7.45)
PH UR: 6.5 — SIGNIFICANT CHANGE UP (ref 5–8)
PLATELET # BLD AUTO: 328 K/UL — SIGNIFICANT CHANGE UP (ref 150–400)
PO2 BLDV: 48 MMHG — HIGH (ref 25–45)
POTASSIUM BLDV-SCNC: 3.5 MMOL/L — SIGNIFICANT CHANGE UP (ref 3.5–5.3)
POTASSIUM SERPL-MCNC: 3.9 MMOL/L — SIGNIFICANT CHANGE UP (ref 3.5–5.3)
POTASSIUM SERPL-SCNC: 3.9 MMOL/L — SIGNIFICANT CHANGE UP (ref 3.5–5.3)
PROT SERPL-MCNC: 7.3 G/DL — SIGNIFICANT CHANGE UP (ref 6–8.3)
PROT UR-MCNC: 100 — SIGNIFICANT CHANGE UP
PROTHROM AB SERPL-ACNC: 12.4 SEC — SIGNIFICANT CHANGE UP (ref 10.6–13.6)
RBC # BLD: 3.57 M/UL — LOW (ref 3.8–5.2)
RBC # FLD: 15.9 % — HIGH (ref 10.3–14.5)
RBC CASTS # UR COMP ASSIST: 7 /HPF — HIGH (ref 0–4)
RH IG SCN BLD-IMP: NEGATIVE — SIGNIFICANT CHANGE UP
SAO2 % BLDV: 83 % — SIGNIFICANT CHANGE UP (ref 67–88)
SARS-COV-2 RNA SPEC QL NAA+PROBE: SIGNIFICANT CHANGE UP
SODIUM SERPL-SCNC: 137 MMOL/L — SIGNIFICANT CHANGE UP (ref 135–145)
SP GR SPEC: 1.01 — SIGNIFICANT CHANGE UP (ref 1.01–1.02)
UROBILINOGEN FLD QL: NEGATIVE — SIGNIFICANT CHANGE UP
WBC # BLD: 13.08 K/UL — HIGH (ref 3.8–10.5)
WBC # FLD AUTO: 13.08 K/UL — HIGH (ref 3.8–10.5)
WBC UR QL: 16 /HPF — HIGH (ref 0–5)

## 2020-08-05 PROCEDURE — 86077 PHYS BLOOD BANK SERV XMATCH: CPT

## 2020-08-05 PROCEDURE — 74177 CT ABD & PELVIS W/CONTRAST: CPT | Mod: 26

## 2020-08-05 PROCEDURE — 99285 EMERGENCY DEPT VISIT HI MDM: CPT

## 2020-08-05 PROCEDURE — 99223 1ST HOSP IP/OBS HIGH 75: CPT

## 2020-08-05 PROCEDURE — 93010 ELECTROCARDIOGRAM REPORT: CPT

## 2020-08-05 PROCEDURE — 71045 X-RAY EXAM CHEST 1 VIEW: CPT | Mod: 26

## 2020-08-05 RX ORDER — DEXTROSE 50 % IN WATER 50 %
12.5 SYRINGE (ML) INTRAVENOUS ONCE
Refills: 0 | Status: DISCONTINUED | OUTPATIENT
Start: 2020-08-05 | End: 2020-08-06

## 2020-08-05 RX ORDER — AMLODIPINE BESYLATE 2.5 MG/1
5 TABLET ORAL DAILY
Refills: 0 | Status: DISCONTINUED | OUTPATIENT
Start: 2020-08-05 | End: 2020-08-06

## 2020-08-05 RX ORDER — VANCOMYCIN HCL 1 G
1000 VIAL (EA) INTRAVENOUS EVERY 12 HOURS
Refills: 0 | Status: DISCONTINUED | OUTPATIENT
Start: 2020-08-05 | End: 2020-08-07

## 2020-08-05 RX ORDER — INSULIN LISPRO 100/ML
VIAL (ML) SUBCUTANEOUS
Refills: 0 | Status: DISCONTINUED | OUTPATIENT
Start: 2020-08-05 | End: 2020-08-05

## 2020-08-05 RX ORDER — DEXTROSE 50 % IN WATER 50 %
15 SYRINGE (ML) INTRAVENOUS ONCE
Refills: 0 | Status: DISCONTINUED | OUTPATIENT
Start: 2020-08-05 | End: 2020-08-06

## 2020-08-05 RX ORDER — GLUCAGON INJECTION, SOLUTION 0.5 MG/.1ML
1 INJECTION, SOLUTION SUBCUTANEOUS ONCE
Refills: 0 | Status: DISCONTINUED | OUTPATIENT
Start: 2020-08-05 | End: 2020-08-09

## 2020-08-05 RX ORDER — METFORMIN HYDROCHLORIDE 850 MG/1
500 TABLET ORAL
Refills: 0 | Status: DISCONTINUED | OUTPATIENT
Start: 2020-08-05 | End: 2020-08-05

## 2020-08-05 RX ORDER — ACETAMINOPHEN 500 MG
975 TABLET ORAL EVERY 6 HOURS
Refills: 0 | Status: DISCONTINUED | OUTPATIENT
Start: 2020-08-05 | End: 2020-08-07

## 2020-08-05 RX ORDER — ATORVASTATIN CALCIUM 80 MG/1
10 TABLET, FILM COATED ORAL AT BEDTIME
Refills: 0 | Status: DISCONTINUED | OUTPATIENT
Start: 2020-08-05 | End: 2020-08-09

## 2020-08-05 RX ORDER — DEXTROSE 50 % IN WATER 50 %
25 SYRINGE (ML) INTRAVENOUS ONCE
Refills: 0 | Status: DISCONTINUED | OUTPATIENT
Start: 2020-08-05 | End: 2020-08-05

## 2020-08-05 RX ORDER — HEPARIN SODIUM 5000 [USP'U]/ML
5000 INJECTION INTRAVENOUS; SUBCUTANEOUS EVERY 8 HOURS
Refills: 0 | Status: DISCONTINUED | OUTPATIENT
Start: 2020-08-05 | End: 2020-08-07

## 2020-08-05 RX ORDER — DEXTROSE 50 % IN WATER 50 %
25 SYRINGE (ML) INTRAVENOUS ONCE
Refills: 0 | Status: DISCONTINUED | OUTPATIENT
Start: 2020-08-05 | End: 2020-08-06

## 2020-08-05 RX ORDER — METFORMIN HYDROCHLORIDE 850 MG/1
500 TABLET ORAL
Refills: 0 | Status: DISCONTINUED | OUTPATIENT
Start: 2020-08-05 | End: 2020-08-06

## 2020-08-05 RX ORDER — INSULIN LISPRO 100/ML
VIAL (ML) SUBCUTANEOUS EVERY 6 HOURS
Refills: 0 | Status: DISCONTINUED | OUTPATIENT
Start: 2020-08-05 | End: 2020-08-05

## 2020-08-05 RX ORDER — VANCOMYCIN HCL 1 G
1000 VIAL (EA) INTRAVENOUS ONCE
Refills: 0 | Status: COMPLETED | OUTPATIENT
Start: 2020-08-05 | End: 2020-08-05

## 2020-08-05 RX ORDER — PIPERACILLIN AND TAZOBACTAM 4; .5 G/20ML; G/20ML
3.38 INJECTION, POWDER, LYOPHILIZED, FOR SOLUTION INTRAVENOUS EVERY 8 HOURS
Refills: 0 | Status: DISCONTINUED | OUTPATIENT
Start: 2020-08-05 | End: 2020-08-07

## 2020-08-05 RX ORDER — SODIUM CHLORIDE 9 MG/ML
1000 INJECTION, SOLUTION INTRAVENOUS
Refills: 0 | Status: DISCONTINUED | OUTPATIENT
Start: 2020-08-05 | End: 2020-08-06

## 2020-08-05 RX ORDER — SODIUM CHLORIDE 9 MG/ML
1000 INJECTION, SOLUTION INTRAVENOUS
Refills: 0 | Status: DISCONTINUED | OUTPATIENT
Start: 2020-08-05 | End: 2020-08-05

## 2020-08-05 RX ORDER — SODIUM CHLORIDE 9 MG/ML
800 INJECTION, SOLUTION INTRAVENOUS ONCE
Refills: 0 | Status: COMPLETED | OUTPATIENT
Start: 2020-08-05 | End: 2020-08-05

## 2020-08-05 RX ORDER — ACETAMINOPHEN 500 MG
650 TABLET ORAL ONCE
Refills: 0 | Status: COMPLETED | OUTPATIENT
Start: 2020-08-05 | End: 2020-08-05

## 2020-08-05 RX ORDER — INSULIN LISPRO 100/ML
VIAL (ML) SUBCUTANEOUS
Refills: 0 | Status: DISCONTINUED | OUTPATIENT
Start: 2020-08-05 | End: 2020-08-06

## 2020-08-05 RX ORDER — DEXTROSE 50 % IN WATER 50 %
12.5 SYRINGE (ML) INTRAVENOUS ONCE
Refills: 0 | Status: DISCONTINUED | OUTPATIENT
Start: 2020-08-05 | End: 2020-08-05

## 2020-08-05 RX ORDER — DEXTROSE 50 % IN WATER 50 %
15 SYRINGE (ML) INTRAVENOUS ONCE
Refills: 0 | Status: DISCONTINUED | OUTPATIENT
Start: 2020-08-05 | End: 2020-08-05

## 2020-08-05 RX ORDER — ASPIRIN/CALCIUM CARB/MAGNESIUM 324 MG
81 TABLET ORAL DAILY
Refills: 0 | Status: DISCONTINUED | OUTPATIENT
Start: 2020-08-05 | End: 2020-08-05

## 2020-08-05 RX ORDER — SODIUM CHLORIDE 9 MG/ML
1000 INJECTION INTRAMUSCULAR; INTRAVENOUS; SUBCUTANEOUS ONCE
Refills: 0 | Status: COMPLETED | OUTPATIENT
Start: 2020-08-05 | End: 2020-08-05

## 2020-08-05 RX ORDER — PIPERACILLIN AND TAZOBACTAM 4; .5 G/20ML; G/20ML
3.38 INJECTION, POWDER, LYOPHILIZED, FOR SOLUTION INTRAVENOUS ONCE
Refills: 0 | Status: COMPLETED | OUTPATIENT
Start: 2020-08-05 | End: 2020-08-05

## 2020-08-05 RX ORDER — PANTOPRAZOLE SODIUM 20 MG/1
40 TABLET, DELAYED RELEASE ORAL
Refills: 0 | Status: DISCONTINUED | OUTPATIENT
Start: 2020-08-05 | End: 2020-08-09

## 2020-08-05 RX ORDER — VANCOMYCIN HCL 1 G
125 VIAL (EA) INTRAVENOUS ONCE
Refills: 0 | Status: COMPLETED | OUTPATIENT
Start: 2020-08-05 | End: 2020-08-05

## 2020-08-05 RX ORDER — PIPERACILLIN AND TAZOBACTAM 4; .5 G/20ML; G/20ML
3.38 INJECTION, POWDER, LYOPHILIZED, FOR SOLUTION INTRAVENOUS ONCE
Refills: 0 | Status: DISCONTINUED | OUTPATIENT
Start: 2020-08-05 | End: 2020-08-05

## 2020-08-05 RX ADMIN — PIPERACILLIN AND TAZOBACTAM 25 GRAM(S): 4; .5 INJECTION, POWDER, LYOPHILIZED, FOR SOLUTION INTRAVENOUS at 22:11

## 2020-08-05 RX ADMIN — SODIUM CHLORIDE 1000 MILLILITER(S): 9 INJECTION INTRAMUSCULAR; INTRAVENOUS; SUBCUTANEOUS at 06:13

## 2020-08-05 RX ADMIN — PIPERACILLIN AND TAZOBACTAM 200 GRAM(S): 4; .5 INJECTION, POWDER, LYOPHILIZED, FOR SOLUTION INTRAVENOUS at 09:25

## 2020-08-05 RX ADMIN — HEPARIN SODIUM 5000 UNIT(S): 5000 INJECTION INTRAVENOUS; SUBCUTANEOUS at 22:11

## 2020-08-05 RX ADMIN — Medication 975 MILLIGRAM(S): at 18:21

## 2020-08-05 RX ADMIN — METFORMIN HYDROCHLORIDE 500 MILLIGRAM(S): 850 TABLET ORAL at 18:21

## 2020-08-05 RX ADMIN — PIPERACILLIN AND TAZOBACTAM 3.38 GRAM(S): 4; .5 INJECTION, POWDER, LYOPHILIZED, FOR SOLUTION INTRAVENOUS at 09:55

## 2020-08-05 RX ADMIN — Medication 650 MILLIGRAM(S): at 06:27

## 2020-08-05 RX ADMIN — Medication 250 MILLIGRAM(S): at 10:09

## 2020-08-05 RX ADMIN — ATORVASTATIN CALCIUM 10 MILLIGRAM(S): 80 TABLET, FILM COATED ORAL at 22:11

## 2020-08-05 RX ADMIN — Medication 250 MILLIGRAM(S): at 22:11

## 2020-08-05 RX ADMIN — SODIUM CHLORIDE 800 MILLILITER(S): 9 INJECTION, SOLUTION INTRAVENOUS at 06:28

## 2020-08-05 RX ADMIN — AMLODIPINE BESYLATE 5 MILLIGRAM(S): 2.5 TABLET ORAL at 18:21

## 2020-08-05 RX ADMIN — Medication 125 MILLIGRAM(S): at 07:22

## 2020-08-05 NOTE — CONSULT NOTE ADULT - SUBJECTIVE AND OBJECTIVE BOX
REED MAYER  65y  Female [id number]    HPI:        Name of GYN Physician:     POB:      Pgyn: Denies fibroids, cysts, endometriosis, STI's, Abnormal pap smears     Home meds:     Hospital Meds:   MEDICATIONS  (STANDING):    MEDICATIONS  (PRN):      Allergies    lidocaine (Unknown)    Intolerances        PAST MEDICAL & SURGICAL HISTORY:  HTN (hypertension)  HLD (hyperlipidemia)  Arthritis  DM (diabetes mellitus)  Status post bilateral oophorectomy      FAMILY HISTORY:      Social History:  Denies smoking use, drug use, alcohol use.   +occasional social alcohol use    Vital Signs Last 24 Hrs  T(C): 37.3 (05 Aug 2020 10:16), Max: 37.7 (05 Aug 2020 04:55)  T(F): 99.2 (05 Aug 2020 10:16), Max: 99.9 (05 Aug 2020 04:55)  HR: 71 (05 Aug 2020 10:16) (70 - 105)  BP: 115/92 (05 Aug 2020 10:16) (113/75 - 141/87)  BP(mean): --  RR: 16 (05 Aug 2020 10:16) (16 - 17)  SpO2: 99% (05 Aug 2020 10:16) (97% - 100%)    Physical Exam:   General: sitting comftorably in bed, NAD   HEENT: neck supple, full ROM  CV: RR S1S2 no m/r/g  Lungs: CTA b/l, good air flow b/l   Back: No CVA tenderness  Abd: Soft, non-tender, non-distended.  Bowel sounds present.    :  No bleeding on pad.    External labia wnl.  Bimanual exam with no cervical motion tenderness, uterus wnl, adnexa non palpable b/l.  Cervix closed vs. Cervix dilated    cm   Speculum Exam: No active bleeding from os.  Physiologic discharge.    Ext: non-tender b/l, no edema     LABS:                              10.9   13.08 )-----------( 328      ( 05 Aug 2020 05:44 )             34.0     08-05    137  |  101  |  14  ----------------------------<  141<H>  3.9   |  22  |  0.70    Ca    9.5      05 Aug 2020 05:44    TPro  7.3  /  Alb  3.6  /  TBili  3.2<H>  /  DBili  x   /  AST  65<H>  /  ALT  16  /  AlkPhos  47  08-05    I&O's Detail    PT/INR - ( 05 Aug 2020 05:44 )   PT: 12.4 sec;   INR: 1.04 ratio         PTT - ( 05 Aug 2020 05:44 )  PTT:24.6 sec  Urinalysis Basic - ( 05 Aug 2020 05:43 )    Color: Yellow / Appearance: Slightly Turbid / S.011 / pH: x  Gluc: x / Ketone: Negative  / Bili: Negative / Urobili: Negative   Blood: x / Protein: 100 / Nitrite: Negative   Leuk Esterase: Small / RBC: 7 /hpf / WBC 16 /HPF   Sq Epi: x / Non Sq Epi: 3 /hpf / Bacteria: Negative        RADIOLOGY & ADDITIONAL STUDIES: REED MAYER  65y  Female [id number]    66yo P1, postmenopausal female, POD#8 s/p b/l oophorectomy presenting with fever, chills, in setting diarrhea x few days. States that diarrhea is many times a day, watery, and smells bad. Started when she had antibiotics (amoxicillin). Also endorsing some dysuria.     Pt was admitted to Gyn service POD#1-3 for hemoperitoneum, in hemorrhagic shock, requiring 3u PRBC, with stabilization of VS and stable hemoperitoneum on CT imaging (POD1 and POD3).       Ob/Gyn Physician: Dr. Woo ([facility]); Gyn team called and spoke to him over the phone on initial admission; states uncomplicated LSC b/l oophorectomy with minimal blood loss.     Obhx:  x1  GynHx: Denies fibroids, abnormal pap smears, STIs, oophorectomy for ovarian cysts  PMH: Arthritis, HLD, Diabetes, GERD, HTN  PSH: appendectomy in childhood      Home Medications:  adalimumab 40 mg/0.4 mL subcutaneous kit: 40 milligram(s) subcutaneous every other week (2020 16:53)  amLODIPine 5 mg oral tablet: 1 tab(s) orally once a day (2020 16:54)  aspirin 81 mg oral tablet: orally once a day (2020 16:54)  atorvastatin 10 mg oral tablet: 1 tab(s) orally once a day (2020 16:56)  esomeprazole 40 mg oral delayed release capsule: 1 cap(s) orally once a day (2020 16:57)  folic acid 1 mg oral tablet: 1 tab(s) orally once a day (2020 16:57)  metFORMIN 500 mg oral tablet: 1 tab(s) orally 2 times a day (2020 16:59)  Methotrexate Sodium, Preservative Free:  (2020 17:00)      Allergies    lidocaine (Unknown)    Intolerances      Vital Signs Last 24 Hrs  T(C): 37.3 (05 Aug 2020 10:16), Max: 37.7 (05 Aug 2020 04:55)  T(F): 99.2 (05 Aug 2020 10:16), Max: 99.9 (05 Aug 2020 04:55)  HR: 71 (05 Aug 2020 10:16) (70 - 105)  BP: 115/92 (05 Aug 2020 10:16) (113/75 - 141/87)  BP(mean): --  RR: 16 (05 Aug 2020 10:16) (16 - 17)  SpO2: 99% (05 Aug 2020 10:16) (97% - 100%)    Physical Exam:   General: sitting comftorably in bed, NAD   HEENT: neck supple, full ROM  CV: RR S1S2 no m/r/g  Lungs: CTA b/l, good air flow b/l   Back: No CVA tenderness  Abd: Soft, non-tender, non-distended.  Bowel sounds present.    :  No bleeding on pad.    External labia wnl.  Bimanual exam with no cervical motion tenderness, uterus wnl, adnexa non palpable b/l.  Cervix closed vs. Cervix dilated    cm   Speculum Exam: No active bleeding from os.  Physiologic discharge.    Ext: non-tender b/l, no edema     LABS:                              10.9   13.08 )-----------( 328      ( 05 Aug 2020 05:44 )             34.0     08-05    137  |  101  |  14  ----------------------------<  141<H>  3.9   |  22  |  0.70    Ca    9.5      05 Aug 2020 05:44    TPro  7.3  /  Alb  3.6  /  TBili  3.2<H>  /  DBili  x   /  AST  65<H>  /  ALT  16  /  AlkPhos  47  08-05    I&O's Detail    PT/INR - ( 05 Aug 2020 05:44 )   PT: 12.4 sec;   INR: 1.04 ratio         PTT - ( 05 Aug 2020 05:44 )  PTT:24.6 sec  Urinalysis Basic - ( 05 Aug 2020 05:43 )    Color: Yellow / Appearance: Slightly Turbid / S.011 / pH: x  Gluc: x / Ketone: Negative  / Bili: Negative / Urobili: Negative   Blood: x / Protein: 100 / Nitrite: Negative   Leuk Esterase: Small / RBC: 7 /hpf / WBC 16 /HPF   Sq Epi: x / Non Sq Epi: 3 /hpf / Bacteria: Negative        RADIOLOGY & ADDITIONAL STUDIES: REED MAYER  65y  Female [id number]   #[id number]      64yo P1, postmenopausal female, POD#8 s/p b/l oophorectomy presenting with fever, chills, in setting diarrhea x few days. States that diarrhea is many times a day, watery, and smells bad. Started when she had antibiotics (amoxicillin). Also endorsing some dysuria.     Pt was admitted to Gyn service POD#1-3 for hemoperitoneum, in hemorrhagic shock, requiring 3u PRBC, with stabilization of VS and stable hemoperitoneum on CT imaging (POD1 and POD3).       Ob/Gyn Physician: Dr. Woo ([place]); Gyn team called and spoke to him over the phone on initial admission; states uncomplicated LSC b/l oophorectomy with minimal blood loss.     Obhx:  x1  GynHx: Denies fibroids, abnormal pap smears, STIs, oophorectomy for ovarian cysts  PMH: Arthritis, HLD, Diabetes, GERD, HTN  PSH: appendectomy in childhood      Home Medications:  adalimumab 40 mg/0.4 mL subcutaneous kit: 40 milligram(s) subcutaneous every other week (2020 16:53)  amLODIPine 5 mg oral tablet: 1 tab(s) orally once a day (2020 16:54)  aspirin 81 mg oral tablet: orally once a day (2020 16:54)  atorvastatin 10 mg oral tablet: 1 tab(s) orally once a day (2020 16:56)  esomeprazole 40 mg oral delayed release capsule: 1 cap(s) orally once a day (2020 16:57)  folic acid 1 mg oral tablet: 1 tab(s) orally once a day (2020 16:57)  metFORMIN 500 mg oral tablet: 1 tab(s) orally 2 times a day (2020 16:59)  Methotrexate Sodium, Preservative Free:  (2020 17:00)      Allergies    lidocaine (Unknown)    Intolerances      Vital Signs Last 24 Hrs  T(C): 37.3 (05 Aug 2020 10:16), Max: 37.7 (05 Aug 2020 04:55)  T(F): 99.2 (05 Aug 2020 10:16), Max: 99.9 (05 Aug 2020 04:55)  HR: 71 (05 Aug 2020 10:16) (70 - 105)  BP: 115/92 (05 Aug 2020 10:16) (113/75 - 141/87)  BP(mean): --  RR: 16 (05 Aug 2020 10:16) (16 - 17)  SpO2: 99% (05 Aug 2020 10:16) (97% - 100%)    Physical Exam:   General: sitting comftorably in bed, NAD   CV: RR S1S2   Lungs: CTA b/l, good air flow b/l   Abd: soft, obese abdomen, diffuse ecchymosis over inferior aspect of panus extending laterally across both ASIS regions. Area of collection vs induration in the infraumbilical region and left lower quadrant immediately deep to previous surgical trochar insertion sites.    :  No bleeding on pad.     Ext: non-tender b/l, no edema     LABS:                              10.9   13.08 )-----------( 328      ( 05 Aug 2020 05:44 )             34.0     08-    137  |  101  |  14  ----------------------------<  141<H>  3.9   |  22  |  0.70    Ca    9.5      05 Aug 2020 05:44    TPro  7.3  /  Alb  3.6  /  TBili  3.2<H>  /  DBili  x   /  AST  65<H>  /  ALT  16  /  AlkPhos  47  08-05    I&O's Detail    PT/INR - ( 05 Aug 2020 05:44 )   PT: 12.4 sec;   INR: 1.04 ratio         PTT - ( 05 Aug 2020 05:44 )  PTT:24.6 sec  Urinalysis Basic - ( 05 Aug 2020 05:43 )    Color: Yellow / Appearance: Slightly Turbid / S.011 / pH: x  Gluc: x / Ketone: Negative  / Bili: Negative / Urobili: Negative   Blood: x / Protein: 100 / Nitrite: Negative   Leuk Esterase: Small / RBC: 7 /hpf / WBC 16 /HPF   Sq Epi: x / Non Sq Epi: 3 /hpf / Bacteria: Negative        RADIOLOGY & ADDITIONAL STUDIES:    < from: CT Abdomen and Pelvis w/ IV Cont (20 @ 08:40) >  EXAM:  CT ABDOMEN AND PELVIS IC                            PROCEDURE DATE:  2020            INTERPRETATION:  CLINICAL INFORMATION: Assess for bleed or abdominal wall abscess. Postop day 8 bilateral oophorectomy with large hemoperitoneum on prior studies.    COMPARISON: CT abdomen and pelvis 2020 and 2020.    PROCEDURE:  CT of the Abdomen and Pelvis was performed without intravenous contrast.  Intravenous contrast: None.  Oral contrast: None.  Sagittal and coronal reformats were performed.    FINDINGS:    LOWER CHEST: Aortic valve calcifications. Mild right pleural thickening. A subcentimeter in short axis right distal paraesophageal node.    LIVER: Within normal limits.  BILE DUCTS: Normal caliber.  GALLBLADDER: Cholelithiasis.  SPLEEN: Within normal limits.  PANCREAS: Within normal limits.  ADRENALS: A 3.7 cm right adrenal adenoma.  KIDNEYS/URETERS: Left renal parapelvic cysts and a subcentimeter right interpole cyst. No hydronephrosis.    BLADDER: Within normal limits.  REPRODUCTIVE ORGANS: Normal uterus. Suture material at the level of the bilateral fallopian tubes. Status post bilateral oophorectomy.    BOWEL: A moderate hiatal hernia. Colonic diverticulosis, concentrated in the sigmoid, without acute diverticulitis. No bowel wall thickening or obstruction. Appendectomy.  PERITONEUM: Small volume mixed attenuation fluid in the dependent abdomen and pelvis with extension to the subhepatic space, compatible with known hemoperitoneum, decreased from moderate on 2020. Cystic structure identified on prior studies is not well appreciated on current exam.  VESSELS: Atherosclerotic change. Patent portal and hepatic veins.  RETROPERITONEUM/LYMPH NODES: No lymphadenopathy.  ABDOMINAL WALL: New since prior study, there are 2 slightly higher than fluid attenuation collections, one in the left lower abdominal wall measuring 4.4 x 2.7 x 3.9 cm (series 2, image 87). A second collection right infraumbilical measures 2.6 x 1.5 x 3.2 cm. Anasarca.  BONES: Degenerative changes.    IMPRESSION:  Interval decrease in size of the hemoperitoneum since 2020, now small.    Two new higher than fluid attenuation collections in the low ventral abdominal wall measuring up to 3.9 cm on the left, possibly hematoma. Infection cannot be excluded.    A 3.7 cm right adrenal adenoma.                    JEFFREY KNAPP M.D., ATTENDING RADIOLOGIST  This document has been electronically signed. Aug  5 2020  8:57AM              < end of copied text >

## 2020-08-05 NOTE — CONSULT NOTE ADULT - PROBLEM SELECTOR RECOMMENDATION 6
Patient endorsing multiple episodes of diarrhea since discharge on 7/31.   - rule out cdiff, however stool appears formed  - if neg, consider GI stool PCR  - fluid hydration and replete electrolytes

## 2020-08-05 NOTE — CONSULT NOTE ADULT - PROBLEM SELECTOR RECOMMENDATION 5
Patient previously on Humira and methotrexate which was stopped 3 weeks prior to surgery. No active sx at this time  - given active infection continue to hold

## 2020-08-05 NOTE — H&P ADULT - NSHPLABSRESULTS_GEN_ALL_CORE
< from: CT Abdomen and Pelvis w/ IV Cont (08.05.20 @ 08:40) >      INTERPRETATION:  CLINICAL INFORMATION: Assess for bleed or abdominal wall abscess. Postop day 8 bilateral oophorectomy with large hemoperitoneum on prior studies.    COMPARISON: CT abdomen and pelvis 7/29/2020 and 7/31/2020.    PROCEDURE:  CT of the Abdomen and Pelvis was performed without intravenous contrast.  Intravenous contrast: None.  Oral contrast: None.  Sagittal and coronal reformats were performed.    FINDINGS:    LOWER CHEST: Aortic valve calcifications. Mild right pleural thickening. A subcentimeter in short axis right distal paraesophageal node.    LIVER: Within normal limits.  BILE DUCTS: Normal caliber.  GALLBLADDER: Cholelithiasis.  SPLEEN: Within normal limits.  PANCREAS: Within normal limits.  ADRENALS: A 3.7 cm right adrenal adenoma.  KIDNEYS/URETERS: Left renal parapelvic cysts and a subcentimeter right interpole cyst. No hydronephrosis.    BLADDER: Within normal limits.  REPRODUCTIVE ORGANS: Normal uterus. Suture material at the level of the bilateral fallopian tubes. Status post bilateral oophorectomy.    BOWEL: A moderate hiatal hernia. Colonic diverticulosis, concentrated in the sigmoid, without acute diverticulitis. No bowel wall thickening or obstruction. Appendectomy.  PERITONEUM: Small volume mixed attenuation fluid in the dependent abdomen and pelvis with extension to the subhepatic space, compatible with known hemoperitoneum, decreased from moderate on 07/31/2020. Cystic structure identified on prior studies is not well appreciated on current exam.  VESSELS: Atherosclerotic change. Patent portal and hepatic veins.  RETROPERITONEUM/LYMPH NODES: No lymphadenopathy.  ABDOMINAL WALL: New since prior study, there are 2 slightly higher than fluid attenuation collections, one in the left lower abdominal wall measuring 4.4 x 2.7 x 3.9 cm (series 2, image 87). A second collection right infraumbilical measures 2.6 x 1.5 x 3.2 cm. Anasarca.  BONES: Degenerative changes.    IMPRESSION:  Interval decrease in size of the hemoperitoneum since 07/31/2020, now small.    Two new higher than fluid attenuation collections in the low ventral abdominal wall measuring up to 3.9 cm on the left, possibly hematoma. Infection cannot be excluded.    A 3.7 cm right adrenal adenoma.    < end of copied text >

## 2020-08-05 NOTE — DISCHARGE NOTE PROVIDER - HOSPITAL COURSE
64yo P1, postmenopausal female, POD#8 s/p b/l oophorectomy admitted with fever, chills, in setting diarrhea x few days and clinical and radiographic evidence of infected hematoma.    On HD#1 patient received Vanc/Zosyn per ED sepsis protocols. Blood and urine cultures showed.... . Stool PCR showed.... . Stool guaiac was.... .     On HD#2.... 66yo P1, postmenopausal female, POD#8 s/p b/l oophorectomy admitted with fever, chills, in setting diarrhea x few days and clinical and radiographic evidence of infected hematoma.    On HD#1 patient received Vanc/Zosyn per ED sepsis protocols. Blood and urine cultures drawn, NGTD. Stool PCR was negative. Stool guaiac was negative. Patient continued to davis afebrile.     On HD#2 patient continued to receive antibiotics and remained afebrile. Leukocytosis downtrending. Continued to have black diarrhea.     On HD#3 antibiotics were discontinued as patient remained afebrile. 66 y/o P1 postmenopausal female initially presented to the [facility] ED on 8/5/2020 with subjective fevers and chills, as well as several days history of diarrhea. Of note, she was 8 days postoperative s/p bilateral oophorectomy performed at an outside hospital. Upon her initial presentation, Ms. Reynolds was noted to be afebrile. CT A/P performed in the emergency department demonstrated: _____. Blood and urine cultures were drawn (ultimately resulted NGTD), labs and a stool PCR (negative) were sent. Ms. Reynolds was empirically initiated on Vancomycin and Zosyn. A stool guaic was negative. The decision was made to admit to GYN for concern of possibly infected hematoma.          On HD#2 the patient was continued on her antibiotics. She remained afebrile, with downtrending Leukocytosis.     On HD#3, after 48hrs of antibiotics, Ms. Reynolds's Vancomycin and Zosyn were discontinued. She remained afebrile.         On the day of discharge the patient is afebrile and hemodynamically stable. She is ambulating without assistance, voiding spontaneously, and tolerating regular diet. He pain is well controlled on oral medication. She is cleared for discharge with instructions for appropriate follow up. 64 y/o P1 postmenopausal female initially presented to the [facility] ED on 8/5/2020 with subjective fevers and chills, as well as several days history of diarrhea. Of note, she was 8 days postoperative s/p bilateral oophorectomy performed at an outside hospital. Upon her initial presentation, Ms. Reynolds was noted to be afebrile. CT A/P performed in the emergency department demonstrated decrease in size of hematoma. Blood and urine cultures were drawn (ultimately resulted NGTD), labs and a stool PCR (negative) were sent. Ms. Reynolds was empirically initiated on Vancomycin and Zosyn. A stool guaic was negative. The decision was made to admit to GYN for concern of possibly infected hematoma.          On HD#2 the patient was continued on her antibiotics. She remained afebrile, with downtrending Leukocytosis.     On HD#3, after 48hrs of antibiotics, Ms. Reynolds's Vancomycin and Zosyn were discontinued. She remained afebrile.     HD#4-5, pt remained afebrile, cultures remained NGTD, leukocytosis downtrended        On the day of discharge the patient is afebrile and hemodynamically stable. She is ambulating without assistance, voiding spontaneously, and tolerating regular diet. He pain is well controlled on oral medication. She is cleared for discharge with instructions for appropriate follow up.

## 2020-08-05 NOTE — H&P ADULT - NSHPPHYSICALEXAM_GEN_ALL_CORE
Vital Signs Last 24 Hrs  T(C): 37.3 (05 Aug 2020 10:16), Max: 37.7 (05 Aug 2020 04:55)  T(F): 99.2 (05 Aug 2020 10:16), Max: 99.9 (05 Aug 2020 04:55)  HR: 71 (05 Aug 2020 10:16) (70 - 105)  BP: 115/92 (05 Aug 2020 10:16) (113/75 - 141/87)  BP(mean): --  RR: 16 (05 Aug 2020 10:16) (16 - 17)  SpO2: 99% (05 Aug 2020 10:16) (97% - 100%)    Physical Exam:   General: sitting comftorably in bed, NAD   CV: RR S1S2   Lungs: CTA b/l, good air flow b/l   Abd: soft, obese abdomen, diffuse ecchymosis over inferior aspect of panus extending laterally across both ASIS regions. Area of collection vs induration in the infraumbilical region and left lower quadrant immediately deep to previous surgical trochar insertion sites.    :  No bleeding on pad.     Ext: non-tender b/l, no edema

## 2020-08-05 NOTE — CONSULT NOTE ADULT - SUBJECTIVE AND OBJECTIVE BOX
----------------------------------------------------------  Interventional Radiology Brief Consult Note  -----------------------------------------------------------    Reason for Referral: eval for drainage of pelvic fluid collections    Clinical Summary: 65y POD#8 s/pm s/p b/l oophorectomy requiring admission POD#1-POD#3 for hemoperitoneum and hemorrhagic shock requiring 3uPRBC re-presenting with diarrhea, fevers, chills and clinical and radiographic evidence of infected hematoma     IR consulted for drainage       Vitals:  T(C): 37.3 (08-05-20 @ 14:35), Max: 37.7 (08-05-20 @ 04:55)  HR: 78 (08-05-20 @ 14:35) (70 - 105)  BP: 117/74 (08-05-20 @ 14:35) (113/75 - 141/87)  RR: 16 (08-05-20 @ 14:35) (16 - 17)  SpO2: 99% (08-05-20 @ 14:35) (97% - 100%)    Labs:           10.9  13.08)-----(328     (08-05-20 @ 05:44)         34.0     137 | 101 | 14  ----------------------< 141     (08-05-20 @ 05:44)  3.9 | 22 | 0.70       PT: 12.4 08-05-20 @ 05:44  aPTT: 24.6<L> 08-05-20 @ 05:44   INR: 1.04 08-05-20 @ 05:44      Assessment: 65y POD#8 s/pm s/p b/l oophorectomy requiring admission POD#1-POD#3 for hemoperitoneum and hemorrhagic shock requiring 3uPRBC re-presenting with diarrhea, fevers, chills and clinical and radiographic evidence of infected hematoma     IR consulted for drainage     reviewed imaging with Dr. Roberto  pelvic and abdominal wall fluid collections appear to be hematoma/evolving hematoma, therefore not amenable to drainage at this time

## 2020-08-05 NOTE — H&P ADULT - ASSESSMENT
65y POD#8 s/pm s/p b/l oophorectomy requiring admission POD#1-POD#3 for hemoperitoneum and hemorrhagic shock requiring 3uPRBC re-presenting with diarrhea, fevers, chills and clinical and radiographic evidence of infected hematoma requiring IV abx, and possible IR drainage. Pt presently hemodynamically stable. Currently on Vanc/Zosyn per ED sepsis protocols, cultures pending.

## 2020-08-05 NOTE — CONSULT NOTE ADULT - PROBLEM SELECTOR RECOMMENDATION 9
Patient with recent elective lap b/l salpingo-oopherectomy on 7/29 for ovarian cysts c/b hemoperitoneum w/ shock (admission 7/29-7/30) treated with supposrtive transfusion who now returns with fevers concerning for superimposing infection  - currently with sepsis but hemodynamically stable  - c/w vanco and zosyn for now, trend vanco trough   - per IR and surgery, no indication for drainage given concern for further bleeding  - f/u blood ctx and urine ctx (in lab)  - would recommend formal ID consult in AM
- to be seen with attending  - final plan pending

## 2020-08-05 NOTE — ED PROVIDER NOTE - SHIFT CHANGE DETAILS
Attending note (Panfilo): I have endorsed this patient to the incoming physician, including clinical history, physical exam, current results and assessment/plan.

## 2020-08-05 NOTE — CONSULT NOTE ADULT - PROBLEM SELECTOR RECOMMENDATION 7
Patient endorsing no known hx of DM  and not on meds. A1c on 7.30.20 was 5.4  - can watch FSG and if remains < 150 can d/c FSG checks

## 2020-08-05 NOTE — DISCHARGE NOTE PROVIDER - CARE PROVIDER_API CALL
GYN Clinic,   [address] [place] [id number]  Phone: [phone]  Fax: (   )    -  Follow Up Time:

## 2020-08-05 NOTE — ED ADULT NURSE REASSESSMENT NOTE - NS ED NURSE REASSESS COMMENT FT1
Report received from Meeta BROTHERS at 0700. Pt A&Ox3 and VSS. Recent oophorectomy and subsequent hemorrhagic shock, generalized lower abdominal ecchymosis present. Present to ED c/o diarrhea. Pt aware stool specimen needed, unable to provide at this time. Oral vanco administered for possible Cdiff. Pt awaiting CT abdomen. Pt aware of plan of care. Will continue to monitor.

## 2020-08-05 NOTE — ED PROVIDER NOTE - ATTENDING CONTRIBUTION TO CARE
· Chief Complaint: The patient is a 65y Female complaining of abdominal pain.  · HPI Objective Statement: 66 yo F with pmh of HTN HLD DM arthritis s/p B/L oophorectomy for postmenopausal ovarian cysts in July 2020, complicated by hemoperitoneum and hemorrhagic shock, now presenting for fever, chills, in setting diarrhea x few days. States that diarrhea is many times a day, watery, and smells bad. Started when she had antibiotics. Also endorsing some dysuria. Denies cough, sob, chest pain, rash, vaginal discharge, abdominal pain.    Attending Statement (RONAL Whittaker MD):    HPI: 66y/o F with h/o HTN, HLD, DM, and postmenopausal ovarian cysts s/p b/l oophorectomy (7/28? at OSH) c/b hemorrhagic shock (recently admitted and discharged) c/o fever, abdominal pain, and diarrhea.    Review of Systems:  -General: +fever   -ENT: no congestion, no difficulty swallowing  -Pulmonary: no cough, no shortness of breath  -Cardiac: no chest pain, no palpitations  -Gastrointestinal: +abdominal pain, no nausea, no vomiting, and +diarrhea.  -Genitourinary: no blood or pain with urination  -Musculoskeletal: no back or neck pain  -Skin: no rashes  -Endocrine: +h/o diabetes  -Neurologic: No focal weakness or numbness    PSH/PMH as noted above    On Physical Exam:  General: well appearing, in NAD, speaking clearly in full sentences and without difficulty; cooperative with exam  HEENT: PERRL, MMM  Neck: no neck tenderness, no nuchal rigidity  Cardiac: normal s1, s2; RRR; no MGR  Lungs: CTABL  Abdomen: +LLQ tenderness without rebound or guarding.  : no bladder tenderness or distension  Skin: intact, no rash  Extremities: no peripheral edema, no gross deformities  Neuro: no gross neurologic deficits    MDM: Pt p/w Abd pain, subj fever; diarrhea (recent hospitalization, antibiotics) - eval for colitis/diverticulitis, surgical complication/abscess and c diff: cbc, cmp, vbg, c diff assay; CT AP; surgery consultation or obgyn consultation pending imaging, as warranted.

## 2020-08-05 NOTE — DISCHARGE NOTE PROVIDER - PROVIDER TOKENS
FREE:[LAST:[GYN Clinic],PHONE:[phone]],FAX:[(   )    -],ADDRESS:[[address] [place] [address]]]

## 2020-08-05 NOTE — ED PROVIDER NOTE - PROGRESS NOTE DETAILS
Attending note (Panfilo): rectally febrile; not meeting sepsis criteria but given concern for infection lacey c diff, have started additional fluids and ordered empiric po vancomycin; additional Abx can be added pending results of imaging, if warranted. OB evaluated patient, will call back for dispo planning - Halima Cook PA-C

## 2020-08-05 NOTE — ED PROVIDER NOTE - PHYSICAL EXAMINATION
Gen: Alert and oriented. Lying comfortably in bed. Answering questions appropriately  HEENT: extra occular movements intact, no nasal discharge, mucous membranes moist  CV: Regular rate and rhythm, +S1/S2, no murmurs/rubs/gallops,   Resp: Clear to ausculation bilaterally, no wheezes/rhonchi/rales  GI: Abdomen soft non-distended, tender to palpation at LLQ, no masses  MSK: Ecchymosis over abdomen. Per patient ecchymoses improving. 2 Port sites on abdomen well healing with no pus or redness from area.  no LE edema, Homans sign negative bl  Neuro: A&Ox4, following commands, moving all four extremities spontaneously  Psych: appropriate mood

## 2020-08-05 NOTE — H&P ADULT - HISTORY OF PRESENT ILLNESS
REED MAYER  65y  Female [id number]   #[id number]      66yo P1, postmenopausal female, POD#8 s/p b/l oophorectomy presenting with fever, chills, in setting diarrhea x few days. States that diarrhea is many times a day, watery, and smells bad. Started prior to starting antibiotics (amoxicillin). Also endorsing some dysuria.     Pt was admitted to Gyn service POD#1-3 for hemoperitoneum, in hemorrhagic shock, requiring 3u PRBC, with stabilization of VS and stable hemoperitoneum on CT imaging (POD1 and POD3).       Ob/Gyn Physician: Dr. Woo ([place]); Gyn team called and spoke to him over the phone on initial admission; states uncomplicated LSC b/l oophorectomy with minimal blood loss.     Obhx:  x1  GynHx: Denies fibroids, abnormal pap smears, STIs, oophorectomy for ovarian cysts  PMH: Arthritis, HLD, Diabetes, GERD, HTN  PSH: appendectomy in childhood      Home Medications:  adalimumab 40 mg/0.4 mL subcutaneous kit: 40 milligram(s) subcutaneous every other week (2020 16:53)  amLODIPine 5 mg oral tablet: 1 tab(s) orally once a day (2020 16:54)  aspirin 81 mg oral tablet: orally once a day (2020 16:54)  atorvastatin 10 mg oral tablet: 1 tab(s) orally once a day (2020 16:56)  esomeprazole 40 mg oral delayed release capsule: 1 cap(s) orally once a day (2020 16:57)  folic acid 1 mg oral tablet: 1 tab(s) orally once a day (2020 16:57)  metFORMIN 500 mg oral tablet: 1 tab(s) orally 2 times a day (2020 16:59)  Methotrexate Sodium, Preservative Free:  (2020 17:00)      Allergies    lidocaine (Unknown)    Intolerances normal...

## 2020-08-05 NOTE — CONSULT NOTE ADULT - SUBJECTIVE AND OBJECTIVE BOX
Patient is a 65y old  Female who presents with a chief complaint of fevers/diarrhea    65F with PMH rheumatoid arthritis,  HLD, GERD, and HTN and post menopausal cysts s/p laparoscopic b/l salpingo-oophorectomy (7/29/20, Dr. Woo ([facility], [place], [place]) c/b hemoperitoneum who now presents with fevers and diarrhea. Patient was doing well until recent gyn surgery for post-menopausal cyst at OSH on 7.28. She then present to [facility] on 7/29/20 with weakness, found to be in hemorrhagic shock 2/2 large hemoperitoneum s/p 3u PRBC. During last admission, CTA Abd/Pelvis was repeated which demonstrated stable hemoperitoneum approximately the same size with no active hemorrhage. She was monitored closely after transfusion and surgery was deferred as overall low concern for ongoing bleeding. She was discharged on 7/31.     Patient now returns with fevers, chills, and multiple episodes of diarrhea. Denies cough, chest pain, abdominal pain, vaginal bleeding. Endorsing discomfort with urination. Patient was initially prescribed augmentin by her gynecologist and later prescribed metronidazole TID by her primary with no relief.  Tmax 99, HR 100s, and SBP in 130s, saturating well on RA. PMN predominant leukocytosis of 13, Hb 10, lactate 1.5. Repeat CTAP with IVC showing interval decrease in hemoperitoneum size, however noting two new higher than fluid attenuation collections in the low ventral abdominal wall measuring up to 3.9 cm on the left, possibly hematoma vs infection. A 3.7 cm right adrenal adenoma also seen on CT.           PAST MEDICAL & SURGICAL HISTORY:  HTN (hypertension)  HLD (hyperlipidemia)  Arthritis  Status post bilateral oophorectomy    Social Hx:   Lives at home with       Fam Hx:   Sister with breast Cancer    Home Medications:  adalimumab 40 mg/0.4 mL subcutaneous kit: 40 milligram(s) subcutaneous every other week (29 Jul 2020 16:53)  amLODIPine 5 mg oral tablet: 1 tab(s) orally once a day (29 Jul 2020 16:54)  aspirin 81 mg oral tablet: orally once a day (29 Jul 2020 16:54)  atorvastatin 10 mg oral tablet: 1 tab(s) orally once a day (29 Jul 2020 16:56)  esomeprazole 40 mg oral delayed release capsule: 1 cap(s) orally once a day (29 Jul 2020 16:57)  folic acid 1 mg oral tablet: 1 tab(s) orally once a day (29 Jul 2020 16:57)  metFORMIN 500 mg oral tablet: 1 tab(s) orally 2 times a day (29 Jul 2020 16:59)  Methotrexate Sodium, Preservative Free:  (29 Jul 2020 17:00)    Allergies: novacaine    REVIEW OF SYSTEMS:  CONSTITUTIONAL: + fever, fatigue  ENMT:  No difficulty hearing, tinnitus, vertigo; No sinus or throat pain  RESPIRATORY: No cough, wheezing, chills or hemoptysis; No shortness of breath  CARDIOVASCULAR: No chest pain, palpitations, dizziness, or leg swelling  GASTROINTESTINAL: + diarrhea. No melena or hematochezia.  GENITOURINARY: No dysuria, frequency, hematuria, or incontinence  NEUROLOGICAL: No headaches, memory loss, loss of strength, numbness, or tremors  SKIN: + bruising  ENDOCRINE: No heat or cold intolerance; No hair loss  MUSCULOSKELETAL: No joint pain or swelling; No muscle, back, or extremity pain  ALLERY AND IMMUNOLOGIC: No hives or eczema    T(C): 37.3 (08-05-20 @ 16:52), Max: 37.7 (08-05-20 @ 04:55)  HR: 75 (08-05-20 @ 16:52) (70 - 105)  BP: 121/76 (08-05-20 @ 16:52) (113/75 - 141/87)  RR: 16 (08-05-20 @ 16:52) (16 - 17)  SpO2: 95% (08-05-20 @ 16:52) (95% - 100%)  Wt(kg): --Vital Signs Last 24 Hrs  T(C): 37.3 (05 Aug 2020 16:52), Max: 37.7 (05 Aug 2020 04:55)  T(F): 99.1 (05 Aug 2020 16:52), Max: 99.9 (05 Aug 2020 04:55)  HR: 75 (05 Aug 2020 16:52) (70 - 105)  BP: 121/76 (05 Aug 2020 16:52) (113/75 - 141/87)  BP(mean): --  RR: 16 (05 Aug 2020 16:52) (16 - 17)  SpO2: 95% (05 Aug 2020 16:52) (95% - 100%)    PHYSICAL EXAM:  GENERAL: NAD, well appearing  EYES: EOMI, PERRLA, conjunctiva and sclera clear  ENMT: Dry MM, No lesions  NECK: Supple, No JVD, Normal thyroid  NERVOUS SYSTEM:  Alert & Oriented X3, Motor Strength 5/5 B/L upper and lower extremities;   CHEST/LUNG: CTABL; respirations non labored  HEART: Regular rate and rhythm; No murmurs  ABDOMEN: Soft, Nontender, Nondistended; Bowel sounds present  EXTREMITIES:  2+ Peripheral Pulses, No clubbing, cyanosis, or edema  SKIN: + ecchymoses in abdominal wall; non tender to palpations    Consultant(s) Notes Reviewed:  [x ] YES  [ ] NO  Care Discussed with Consultants/Other Providers [ x] YES  [ ] NO     LABS:  CBC   08-05-20 @ 05:44  Hematcorit 34.0  Hemoglobin 10.9  Mean Cell Hemoglobin 30.5  Platelet Count-Automated 328  RBC Count 3.57  Red Cell Distrib Width 15.9  Wbc Count 13.08      CMP  08-05-20 @ 05:44  Erika Aminotransferase(ALT/SGPT)16  Albumin, Serum 3.6  Alkaline Phosphatase, Serum 47  Anion Gap, Serum 14  Aspartate Aminotransferase (AST/SGOT)65  Bilirubin Total, Serum 3.2  Blood Urea Nitrogen, Serum 14  Calcium,Total Serum 9.5  Carbon Dioxide, Serum 22  Chloride, Serum 101  Creatinine, Serum 0.70  eGFR if  105  eGFR if Non African American 91  Glucose, Serum 141  Potassium, Serum 3.9  Protein Total, Serum 7.3  Sodium, Serum 137    PT/INR  08-05-20 @ 05:44  INR 1.04  Prothrombin Time Comment --  Prothrobin Time, Smtoeg48.4            RADIOLOGY & ADDITIONAL TESTS:  < from: CT Abdomen and Pelvis w/ IV Cont (08.05.20 @ 08:40) >  FINDINGS:    LOWER CHEST: Aortic valve calcifications. Mild right pleural thickening. A subcentimeter in short axis right distal paraesophageal node.    LIVER: Within normal limits.  BILE DUCTS: Normal caliber.  GALLBLADDER: Cholelithiasis.  SPLEEN: Within normal limits.  PANCREAS: Within normal limits.  ADRENALS: A 3.7 cm right adrenal adenoma.  KIDNEYS/URETERS: Left renal parapelvic cysts and a subcentimeter right interpole cyst. No hydronephrosis.    BLADDER: Within normal limits.  REPRODUCTIVE ORGANS: Normal uterus. Suture material at the level of the bilateral fallopian tubes. Status post bilateral oophorectomy.    BOWEL: A moderate hiatal hernia. Colonic diverticulosis, concentrated in the sigmoid, without acute diverticulitis. No bowel wall thickening or obstruction. Appendectomy.  PERITONEUM: Small volume mixed attenuation fluid in the dependent abdomen and pelvis with extension to the subhepatic space, compatible with known hemoperitoneum, decreased from moderate on 07/31/2020. Cystic structure identified on prior studies is not well appreciated on current exam.  VESSELS: Atherosclerotic change. Patent portal and hepatic veins.  RETROPERITONEUM/LYMPH NODES: No lymphadenopathy.  ABDOMINAL WALL: New since prior study, there are 2 slightly higher than fluid attenuation collections, one in the left lower abdominal wall measuring 4.4 x 2.7 x 3.9 cm (series 2, image 87). A second collection right infraumbilical measures 2.6 x 1.5 x 3.2 cm. Anasarca.  BONES: Degenerative changes.    < end of copied text >      Imaging Personally Reviewed:  [x ] YES  [ ] NO; CXR clear lungs bilateral      EKG personally reviewed: NSR 77 QTc 430s

## 2020-08-05 NOTE — ED ADULT NURSE REASSESSMENT NOTE - NS ED NURSE REASSESS COMMENT FT1
Pt admitted to OB, RTM. Pt denies BM since this AM while in ED. Cdiff collection discontinued. No isolation precautions. Pt resting calmly. No acute distress noted. Will continue to monitor.

## 2020-08-05 NOTE — DISCHARGE NOTE PROVIDER - NSDCMRMEDTOKEN_GEN_ALL_CORE_FT
adalimumab 40 mg/0.4 mL subcutaneous kit: 40 milligram(s) subcutaneous every other week  amLODIPine 5 mg oral tablet: 1 tab(s) orally once a day  aspirin 81 mg oral tablet: orally once a day  atorvastatin 10 mg oral tablet: 1 tab(s) orally once a day  esomeprazole 40 mg oral delayed release capsule: 1 cap(s) orally once a day  folic acid 1 mg oral tablet: 1 tab(s) orally once a day  metFORMIN 500 mg oral tablet: 1 tab(s) orally 2 times a day  Methotrexate Sodium, Preservative Free: acetaminophen 325 mg oral tablet: 3 tab(s) orally every 6 hours, As needed, Mild Pain (1 - 3)  amLODIPine 5 mg oral tablet: 1 tab(s) orally once a day  atorvastatin 10 mg oral tablet: 1 tab(s) orally once a day  esomeprazole 40 mg oral delayed release capsule: 1 cap(s) orally once a day

## 2020-08-05 NOTE — CONSULT NOTE ADULT - ASSESSMENT
65F with PMH rheumatoid arthritis,  HLD, GERD, and HTN and post menopausal cysts s/p laparoscopic b/l salpingo-oophorectomy (7/28) c/b hemoperitoneum warranting admission 7/29-7/31 stabilized with 3u PRBC, now presenting with fevers and diarrhea concerning for sepsis 2/2 infected hematoma vs infectious diarrhea
65y POD#8 s/pm s/p b/l oophorectomy requiring admission POD#1-POD#3 for hemoperitoneum and hemorrhagic shock requiring 3uPRBC re-presenting with diarrhea, fevers, chills and clinical and radiographic evidence of infected hematoma requiring IV abx, and possible IR drainage. Pt presently hemodynamically stable. Currently on Vanc/Zosyn per ED sepsis protocols, cultures pending.
A/P: 65y female with recent surgical history of bilateral oophorectomy by GYN at outside hospital c/b hemoperitoneum and shock. Now presents with abdominal wall collections deep to previous surgical site. General surgery consulted to evaluate for hematoma vs abscess of abdominal wall.    - GYN consult given post GYN procedural status   - U/A + for UTI - on abx already   - no acute general surgical intervention at this time given complication 2/2 GYN surgical procedure   - continue abx - on Vanc/Zosyn from ED   - continue IVF  - please page with additional questions or concerns  - d/w Dr. CATALINA Head  General Surgery Consult Resident   [id number]

## 2020-08-05 NOTE — ED PROVIDER NOTE - CLINICAL SUMMARY MEDICAL DECISION MAKING FREE TEXT BOX
Joseph Frankel PGY2: 64 yo F with pmh of HTN HLD DM arthritis s/p B/L oophorectomy for postmenopausal ovarian cysts in July 2020, complicated by hemoperitoneum and hemorrhagic shock, now presenting for fever, chills, in setting diarrhea x few days. Febrile and mildly tachy. Otherwise VSS. Patient looks well and is non toxic appearing. PE as above. Concern for cdiff and possible resulting bacteremia. Also consider dehydration secondary to diarrhea. Patient tender on exam so consider underlying abdominal wall abscesses. Will get labs, CT AandP, tylenol, and reassess.

## 2020-08-05 NOTE — CONSULT NOTE ADULT - PROBLEM SELECTOR RECOMMENDATION 8
Tb 3.2 likely hemolysis related to break down of prior hematoma  - CTAP not noting biliary pathology  - monitor for low

## 2020-08-05 NOTE — H&P ADULT - PROBLEM SELECTOR PLAN 1
Neuro: Tylenol, Ibuprofen for pain control  CV: IVF. H/H 10.9/34 from 8.2/25 on discharge last admission  GI: Black Diarrhea predates antibiotics, Bilirubin 3.2 from d/c 0.6 likely secondary to hemolytic   Heme: SCDs for DVT ppx, will hold off on HSQ till consult IR  ID: Continue Vancomycin and Zosyn   Endo: ISS   Dispo: Floor. Will reconsult IR for drainage    DOMINGA Huynh PGY3  seen and d/w Dr. Mcqueen

## 2020-08-05 NOTE — DISCHARGE NOTE PROVIDER - NSDCFUADDAPPT_GEN_ALL_CORE_FT
Please schedule an appointment with your doctor, Dr. Woo, within 1 week for follow up.     Please follow up with Dr. Woo and your rheumatologist, regarding restarting your medications.

## 2020-08-05 NOTE — CONSULT NOTE ADULT - ATTENDING COMMENTS
pt seen and examined  agree with note above  no acute sx issues  care per gyn  will signoff, reconsult prn
Heath De La Vega  Hospitalist  Pager [phone]

## 2020-08-05 NOTE — ED ADULT NURSE NOTE - OBJECTIVE STATEMENT
66 yo f pmhx of htn, HLD cyst s/p oophorectomy presents to the ED with c/o LLQ abd pain, diarrhea, and weakness. Pt is [place] speaking utilized the  [id number] Amber. 66 yo f pmhx of htn, HLD cyst s/p oophorectomy presents to the ED with c/o LLQ abd pain, diarrhea, and weakness. Pt is [place] speaking utilized the  [id number] Amber. Pt states she was discharged on 7/31 from ED, reports she was admitted for a hemorhagic shock which was administered 3 units of PRBC's. Pt d/c'd on antibiotics which she then reports started experiencing diarrhea several times a day. PT reports decreased appetite, foul smelling stool, describes the stool as dark liquid, states urine is also dark, pt reports guarding her belly when urinating because of increased pain. PT is A&Ox4, lung sound clear, placed on cardiac monitoring, febrile 100.6 rectal temp, noted bruising on the left lower quadrant, right flank, right lower abd, noted 2 incisions on the left and right lower abd, pain upon palpation to the Left lower quadrant, noted to have an induration around the incision area, pt denies discharge at the incision site. Pt denies headache, dizziness, chest pain, palpitations, cough, SOB.

## 2020-08-05 NOTE — ED PROVIDER NOTE - NS ED ROS FT
Gen: + fever, denies weight loss  CV: Denies chest pain, palpitations  HEENT: Denies neck pain, sore throat, eye discharge  Skin: Denies rash, erythema, color changes  Resp: Denies SOB, cough  Endo: Denies sensitivity to heat, cold, increased urination  GI: Denies constipation, +nausea, denies vomiting  Msk: Denies back pain, LE swelling, extremity pain  : endorses dysuria, increased frequency  Neuro: Denies LOC, weakness, seizures  Psych: Denies hx of psych, hallucinations, SI

## 2020-08-05 NOTE — ED ADULT NURSE REASSESSMENT NOTE - NS ED NURSE REASSESS COMMENT FT1
Pt have BM, stool is formed. Lab will not take formed stool for cdiff collection. Report given to Gaye Richards RN in ED Holding

## 2020-08-05 NOTE — CHART NOTE - NSCHARTNOTEFT_GEN_A_CORE
R1 Gyn Chart Note   #[id number]    S: Pt seen at bedside, no complaints.  called, pt is Tristanian speaking only. Pt being evaluated for dark, black stool. Need for rectal exam in order to perform stool guaiac test 2/2 concern for occult GI bleeding explained. Pt confirmed understanding and gave consent for digital rectal exam.     O:   VS  T(C): 37.3 (08-05-20 @ 16:52)  HR: 75 (08-05-20 @ 16:52)  BP: 121/76 (08-05-20 @ 16:52)  RR: 16 (08-05-20 @ 16:52)  SpO2: 95% (08-05-20 @ 16:52)    Gen: NAD, AAO x3  GI: digital rectal exam performed without incidence  Ext: moving all spontaneously                          10.9   13.08 )-----------( 328      ( 05 Aug 2020 05:44 )             34.0       A/P: 65y POD#8 s/pm s/p b/l oophorectomy requiring admission POD#1-POD#3 for hemoperitoneum and hemorrhagic shock requiring 3uPRBC re-presenting with diarrhea, fevers, chills and clinical and radiographic evidence of infected hematoma requiring IV abx, and possible IR drainage. Pt presently hemodynamically stable. currently w/ sepsis, but hemodynamically stable.   - stool guaiac performed; f/u labs results  - send stool PCR (per IM recs)  - c/w vanco and zosyn; IM reccomends to trend vanco trough   - per IR and surgery, no indication for drainage given concern for further bleeding  - f/u blood ctx and urine ctx (in lab)    D/W attending physician, Dr. Lyla Moss, PGY-1 R1 Gyn Chart Note   #[id number]    S:  called, pt is Vincentian speaking only. Pt seen at bedside, no complaints. Pt being evaluated for dark, black stool. Need for rectal exam in order to perform stool guaiac test 2/2 concern for occult GI bleeding explained. Pt confirmed understanding and gave consent for digital rectal exam.     O:   VS  T(C): 37.3 (08-05-20 @ 16:52)  HR: 75 (08-05-20 @ 16:52)  BP: 121/76 (08-05-20 @ 16:52)  RR: 16 (08-05-20 @ 16:52)  SpO2: 95% (08-05-20 @ 16:52)    Gen: NAD, AAO x3  GI: digital rectal exam performed without incidence  Ext: moving all spontaneously                          10.9   13.08 )-----------( 328      ( 05 Aug 2020 05:44 )             34.0       A/P: 65y POD#8 s/pm s/p b/l oophorectomy requiring admission POD#1-POD#3 for hemoperitoneum and hemorrhagic shock requiring 3uPRBC re-presenting with diarrhea, fevers, chills and clinical and radiographic evidence of infected hematoma requiring IV abx, and possible IR drainage. Pt currently w/ sepsis, but hemodynamically stable.   - stool guaiac performed; f/u labs results  - send stool PCR (per IM recs)  - c/w vanco and zosyn; IM reccomends to trend vanco trough   - per IR and surgery, no indication for drainage given concern for further bleeding  - f/u blood ctx and urine ctx (in lab)    D/W attending physician, Dr. Lyla Moss, PGY-1

## 2020-08-05 NOTE — H&P ADULT - ATTENDING COMMENTS
Pt seen and evaluated.  Briefly, Ms Reynolds  is a 65yo admitted last [id number] postop from bilat laparoscopic salpingectomies with large subQ ecchymotic areas and a stable hemoperitoneum. She was D/C'd home in [facility];e condition after IR advised that collection was too solid to tap. She returns now, jaundiced, with low grade fevers, a mild leukocytosis, black watery stool, and an elevated bilirubin. CT shows 2 new subQ collections ?abscesses. The jaundice and black stools are likely secondary to hemolysis of this hematoma but We will admit to GYN for IV Abx, another IR consult, and GI consult for black, watery stool with elevated bili.

## 2020-08-05 NOTE — ED ADULT NURSE REASSESSMENT NOTE - NS ED NURSE REASSESS COMMENT FT1
Antibiotics administered for possible abscess. Surgery MD at bedside. Pt to be admitted. Pt resting comfortably. Will continue to monitor.

## 2020-08-06 DIAGNOSIS — Z98.890 OTHER SPECIFIED POSTPROCEDURAL STATES: ICD-10-CM

## 2020-08-06 LAB
BASOPHILS # BLD AUTO: 0.03 K/UL — SIGNIFICANT CHANGE UP (ref 0–0.2)
BASOPHILS NFR BLD AUTO: 0.4 % — SIGNIFICANT CHANGE UP (ref 0–2)
CULTURE RESULTS: SIGNIFICANT CHANGE UP
CULTURE RESULTS: SIGNIFICANT CHANGE UP
EOSINOPHIL # BLD AUTO: 0.11 K/UL — SIGNIFICANT CHANGE UP (ref 0–0.5)
EOSINOPHIL NFR BLD AUTO: 1.4 % — SIGNIFICANT CHANGE UP (ref 0–6)
GLUCOSE BLDC GLUCOMTR-MCNC: 96 MG/DL — SIGNIFICANT CHANGE UP (ref 70–99)
HCT VFR BLD CALC: 29.4 % — LOW (ref 34.5–45)
HCT VFR BLD CALC: 30.5 % — LOW (ref 34.5–45)
HGB BLD-MCNC: 9.5 G/DL — LOW (ref 11.5–15.5)
HGB BLD-MCNC: 9.8 G/DL — LOW (ref 11.5–15.5)
IMM GRANULOCYTES NFR BLD AUTO: 0.9 % — SIGNIFICANT CHANGE UP (ref 0–1.5)
LYMPHOCYTES # BLD AUTO: 1.24 K/UL — SIGNIFICANT CHANGE UP (ref 1–3.3)
LYMPHOCYTES # BLD AUTO: 15.6 % — SIGNIFICANT CHANGE UP (ref 13–44)
MCHC RBC-ENTMCNC: 30.8 PG — SIGNIFICANT CHANGE UP (ref 27–34)
MCHC RBC-ENTMCNC: 30.8 PG — SIGNIFICANT CHANGE UP (ref 27–34)
MCHC RBC-ENTMCNC: 32.1 GM/DL — SIGNIFICANT CHANGE UP (ref 32–36)
MCHC RBC-ENTMCNC: 32.3 GM/DL — SIGNIFICANT CHANGE UP (ref 32–36)
MCV RBC AUTO: 95.5 FL — SIGNIFICANT CHANGE UP (ref 80–100)
MCV RBC AUTO: 95.9 FL — SIGNIFICANT CHANGE UP (ref 80–100)
MONOCYTES # BLD AUTO: 0.83 K/UL — SIGNIFICANT CHANGE UP (ref 0–0.9)
MONOCYTES NFR BLD AUTO: 10.5 % — SIGNIFICANT CHANGE UP (ref 2–14)
NEUTROPHILS # BLD AUTO: 5.66 K/UL — SIGNIFICANT CHANGE UP (ref 1.8–7.4)
NEUTROPHILS NFR BLD AUTO: 71.2 % — SIGNIFICANT CHANGE UP (ref 43–77)
NRBC # BLD: 0 /100 WBCS — SIGNIFICANT CHANGE UP (ref 0–0)
NRBC # BLD: 0 /100 WBCS — SIGNIFICANT CHANGE UP (ref 0–0)
PLATELET # BLD AUTO: 302 K/UL — SIGNIFICANT CHANGE UP (ref 150–400)
PLATELET # BLD AUTO: 347 K/UL — SIGNIFICANT CHANGE UP (ref 150–400)
RBC # BLD: 3.08 M/UL — LOW (ref 3.8–5.2)
RBC # BLD: 3.18 M/UL — LOW (ref 3.8–5.2)
RBC # FLD: 15.8 % — HIGH (ref 10.3–14.5)
RBC # FLD: 15.9 % — HIGH (ref 10.3–14.5)
SARS-COV-2 IGG SERPL QL IA: NEGATIVE — SIGNIFICANT CHANGE UP
SARS-COV-2 IGM SERPL IA-ACNC: 0.25 INDEX — SIGNIFICANT CHANGE UP
SPECIMEN SOURCE: SIGNIFICANT CHANGE UP
SPECIMEN SOURCE: SIGNIFICANT CHANGE UP
VANCOMYCIN TROUGH SERPL-MCNC: 8.1 UG/ML — LOW (ref 10–20)
WBC # BLD: 7.94 K/UL — SIGNIFICANT CHANGE UP (ref 3.8–10.5)
WBC # BLD: 8.98 K/UL — SIGNIFICANT CHANGE UP (ref 3.8–10.5)
WBC # FLD AUTO: 7.94 K/UL — SIGNIFICANT CHANGE UP (ref 3.8–10.5)
WBC # FLD AUTO: 8.98 K/UL — SIGNIFICANT CHANGE UP (ref 3.8–10.5)

## 2020-08-06 PROCEDURE — 99233 SBSQ HOSP IP/OBS HIGH 50: CPT

## 2020-08-06 RX ORDER — AMLODIPINE BESYLATE 2.5 MG/1
5 TABLET ORAL DAILY
Refills: 0 | Status: DISCONTINUED | OUTPATIENT
Start: 2020-08-06 | End: 2020-08-09

## 2020-08-06 RX ADMIN — Medication 975 MILLIGRAM(S): at 06:07

## 2020-08-06 RX ADMIN — PIPERACILLIN AND TAZOBACTAM 25 GRAM(S): 4; .5 INJECTION, POWDER, LYOPHILIZED, FOR SOLUTION INTRAVENOUS at 13:17

## 2020-08-06 RX ADMIN — AMLODIPINE BESYLATE 5 MILLIGRAM(S): 2.5 TABLET ORAL at 06:07

## 2020-08-06 RX ADMIN — Medication 975 MILLIGRAM(S): at 13:18

## 2020-08-06 RX ADMIN — PIPERACILLIN AND TAZOBACTAM 25 GRAM(S): 4; .5 INJECTION, POWDER, LYOPHILIZED, FOR SOLUTION INTRAVENOUS at 06:07

## 2020-08-06 RX ADMIN — HEPARIN SODIUM 5000 UNIT(S): 5000 INJECTION INTRAVENOUS; SUBCUTANEOUS at 06:06

## 2020-08-06 RX ADMIN — PIPERACILLIN AND TAZOBACTAM 25 GRAM(S): 4; .5 INJECTION, POWDER, LYOPHILIZED, FOR SOLUTION INTRAVENOUS at 20:38

## 2020-08-06 RX ADMIN — ATORVASTATIN CALCIUM 10 MILLIGRAM(S): 80 TABLET, FILM COATED ORAL at 21:45

## 2020-08-06 RX ADMIN — PANTOPRAZOLE SODIUM 40 MILLIGRAM(S): 20 TABLET, DELAYED RELEASE ORAL at 06:07

## 2020-08-06 RX ADMIN — Medication 975 MILLIGRAM(S): at 06:18

## 2020-08-06 RX ADMIN — HEPARIN SODIUM 5000 UNIT(S): 5000 INJECTION INTRAVENOUS; SUBCUTANEOUS at 13:17

## 2020-08-06 RX ADMIN — Medication 250 MILLIGRAM(S): at 10:17

## 2020-08-06 RX ADMIN — HEPARIN SODIUM 5000 UNIT(S): 5000 INJECTION INTRAVENOUS; SUBCUTANEOUS at 21:45

## 2020-08-06 NOTE — PROGRESS NOTE ADULT - ATTENDING COMMENTS
GYN ATTENDING/MIGS FELLOW PROGRESS NOTE  -delayed entry due to direct patient care-  I have seen and evaluated the patient, read the above note, and agree with resident assessment and plan with the following additions/exceptions:     Ms. Reynolds is a Tunisian-speaking postmenopausal 64yo now HD#2 readmitted for workup of subjective fevers at home in the setting of known hemoperitoneum. Of note, pt is s/p recent admission to [facility] (7/29-7/31) for acute blood loss anemia s/p laparoscopic BSO (OSH 7/28). On that admission, she presented with hypovolemic hemorrhagic shock and received 3u prBC. Imaging during that time demonstrated no active arterial bleeding with overall reassuring rise in Hct after transfusion. Repeat imaging performed on that admission demonstrated stable hemoperitoneum, and pt discharged home. In the interim, pt followed up with her surgeon who Rx Agumentin for presumed surgical site cellulitis. She then had several episodes of diarrhea and fevers Tmax 102F at home which prompted her to represent to the hospital.     Since admission, patient has remained afebrile however Tmax 100.3F. VS wnl. Exam notable for Thorpe Yeung sign (representative of retroperitoneal bleed). 2 healing laparoscopic port sites at LL/RLQ surrounded by ecchymoses in a bandline fashion extensin to bilateral flanks and overlying mons. No evidence of infection/drainage at this time. Repeat CT A/P demonstrates small complex free fluid in the pelvic, decrease in hematoma size. Subutaneous heterogeneity noted. Pt's clinical presentation likely 2/2 superinfected pelvic hematoma. No e/o active bleeding and H/H trending upwards. Leukocytosis now resolved. Patient is s/p >24 hours VancZosyn without issue.     - Will continue IV abx for total 48h with plan to dc IV abx afterwards  - Continue close monitoring, pt recovering well   - BCx NTG x2, UCx normal geintourinal david   - DepoProvera for contraception    DEEDEE Cronin  PGY5   MIGS Fellow   GYN Attneding of the week GYN ATTENDING/MIGS FELLOW PROGRESS NOTE  -delayed entry due to direct patient care-  I have seen and evaluated the patient, read the above note, and agree with resident assessment and plan with the following additions/exceptions:     Ms. Reynolds is a [place]-speaking postmenopausal 66yo now HD#2 readmitted for workup of subjective fevers at home in the setting of known hemoperitoneum. Of note, pt is s/p recent admission to [facility] (7/29-7/31) for acute blood loss anemia s/p laparoscopic BSO (OSH 7/28). On that admission, she presented with hypovolemic hemorrhagic shock and received 3u prBC. Imaging during that time demonstrated no active arterial bleeding with overall reassuring rise in Hct after transfusion. Repeat imaging performed on that admission demonstrated stable hemoperitoneum, and pt discharged home. In the interim, pt followed up with her surgeon who Rx Agumentin for presumed surgical site cellulitis. She then had several episodes of diarrhea and fevers Tmax 102F at home which prompted her to represent to the hospital.     Since admission, patient has remained afebrile however Tmax 100.3F. VS wnl. Exam notable for Thorpe Yeung sign (representative of retroperitoneal bleed). 2 healing laparoscopic port sites at LL/RLQ surrounded by ecchymoses in a bandline fashion extensin to bilateral flanks and overlying mons. No evidence of infection/drainage at this time. Repeat CT A/P demonstrates small complex free fluid in the pelvic, decrease in hematoma size. Subutaneous heterogeneity noted. Pt's clinical presentation likely 2/2 superinfected pelvic hematoma. No e/o active bleeding and H/H trending upwards. Leukocytosis now resolved. Patient is s/p >24 hours VancZosyn without issue.     - Will continue IV abx for total 48h with plan to dc IV abx afterwards  - Continue close monitoring, pt recovering well   - BCx NTG x2, UCx normal geintourinal david     DEEDEE Cronin  PGY5   MIGS Fellow   GYN Attneding of the week

## 2020-08-06 NOTE — PROGRESS NOTE ADULT - SUBJECTIVE AND OBJECTIVE BOX
R1 OBGYN Progress Note    POD#9   HD#2    Patient seen and examined at bedside. Patient is reporting fatigue and poor appetite. Pain well controlled. Patient is ambulating and tolerating small amount of PO. Passing flatus. Patient is voiding spontaneously. Diarrhea improved. Patinet states that it was green and watery before, but now is more yellow in color and well formed. Denies CP, SOB, N/V, fevers, and chills.    Vital Signs Last 24 Hours  T(C): 37 (08-06-20 @ 05:55), Max: 37.3 (08-05-20 @ 06:44)  HR: 86 (08-06-20 @ 05:55) (70 - 86)  BP: 133/81 (08-06-20 @ 05:55) (113/75 - 133/81)  RR: 16 (08-06-20 @ 05:55) (16 - 16)  SpO2: 96% (08-06-20 @ 05:55) (95% - 100%)    I&O's Summary    05 Aug 2020 07:01  -  06 Aug 2020 06:41  --------------------------------------------------------  IN: 60 mL / OUT: 0 mL / NET: 60 mL        Physical Exam:  General: NAD  CV: RR, S1, S2,  Lungs: CTA b/l,  Abdomen: Soft, non- tender to palpation diffusely, normoactive bowel sounds, +grey ge sign  Incision: 3 LSC site, small induration on L port site, non-tender  Ext: No pain or swelling     Labs:                        10.9   13.08 )-----------( 328      ( 05 Aug 2020 05:44 )             34.0   baso 0.4    eos 0.8    imm gran 1.3    lymph 13.6   mono 10.3   poly 73.6       MEDICATIONS  (STANDING):  acetaminophen   Tablet .. 975 milliGRAM(s) Oral every 6 hours  amLODIPine   Tablet 5 milliGRAM(s) Oral daily  atorvastatin 10 milliGRAM(s) Oral at bedtime  heparin   Injectable 5000 Unit(s) SubCutaneous every 8 hours  pantoprazole    Tablet 40 milliGRAM(s) Oral before breakfast  piperacillin/tazobactam IVPB.. 3.375 Gram(s) IV Intermittent every 8 hours  vancomycin  IVPB 1000 milliGRAM(s) IV Intermittent every 12 hours    MEDICATIONS  (PRN):  glucagon  Injectable 1 milliGRAM(s) IntraMuscular once PRN Glucose LESS THAN 70 milligrams/deciliter R1 OBGYN Progress Note    POD#9   HD#2    Patient seen and examined at bedside. Patient is reporting fatigue and poor appetite. Pain well controlled. Patient is ambulating and tolerating small amount of PO. Passing flatus. Patient is voiding spontaneously. Diarrhea improved. Gaetano states that it was dark green and watery before, but now is more yellow in color and soft formed. She denies any abdominal pain.  Denies CP, SOB, N/V, fevers, chills, LE pain.    Vital Signs Last 24 Hours  T(C): 37 (08-06-20 @ 05:55), Max: 37.3 (08-05-20 @ 06:44)  HR: 86 (08-06-20 @ 05:55) (70 - 86)  BP: 133/81 (08-06-20 @ 05:55) (113/75 - 133/81)  RR: 16 (08-06-20 @ 05:55) (16 - 16)  SpO2: 96% (08-06-20 @ 05:55) (95% - 100%)    I&O's Summary    05 Aug 2020 07:01  -  06 Aug 2020 06:41  --------------------------------------------------------  IN: 60 mL / OUT: 0 mL / NET: 60 mL    Physical Exam:  General: NAD  CV: RRR  Lungs: CTA b/l,  Abdomen: Soft, non- tender to palpation diffusely, normoactive bowel sounds, +grey ge sign  Incision: 3 LSC site w/ surrounding ecchymosis, non-tender, skin at incisions c/d/i w/o erythema/edema/warmth  Ext: No pain or swelling, venodynes in place    Labs:                        10.9   13.08 )-----------( 328      ( 05 Aug 2020 05:44 )             34.0   baso 0.4    eos 0.8    imm gran 1.3    lymph 13.6   mono 10.3   poly 73.6       MEDICATIONS  (STANDING):  acetaminophen   Tablet .. 975 milliGRAM(s) Oral every 6 hours  amLODIPine   Tablet 5 milliGRAM(s) Oral daily  atorvastatin 10 milliGRAM(s) Oral at bedtime  heparin   Injectable 5000 Unit(s) SubCutaneous every 8 hours  pantoprazole    Tablet 40 milliGRAM(s) Oral before breakfast  piperacillin/tazobactam IVPB.. 3.375 Gram(s) IV Intermittent every 8 hours  vancomycin  IVPB 1000 milliGRAM(s) IV Intermittent every 12 hours    MEDICATIONS  (PRN):  glucagon  Injectable 1 milliGRAM(s) IntraMuscular once PRN Glucose LESS THAN 70 milligrams/deciliter

## 2020-08-06 NOTE — PATIENT PROFILE ADULT - ABILITY TO HEAR (WITH HEARING AID OR HEARING APPLIANCE IF NORMALLY USED):
Primary language Algerian. Speaks and understands English and makes needs known in English./Adequate: hears normal conversation without difficulty

## 2020-08-06 NOTE — CHART NOTE - NSCHARTNOTEFT_GEN_A_CORE
R2 GYNECOLOGY CHART NOTE    Pt seen at bedside for reports of black-green stool. Pt concerned regarding stool color as she has not been taking Fe tabs since admission, and 9 episodes of "yogurt consistency" bowel movements. Explained to pt that we are not concerned for a GI bleed. Color of stool likely 2/2 hemolyzed blood digested, which could also cause her diarrhea. Denies CP, SOB, lightheadedness/dizziness, nausea/vomiting, pain, fevers/chills.      provided translation over phone.   Pt questions/concerns addressed to her and 's apparent satisfaction.    d/w Dr. Rosaura Hallman R2  [id number]

## 2020-08-06 NOTE — PROGRESS NOTE ADULT - ASSESSMENT
A/P: 65y POD#9 s/p b/l oophorectomy requiring admission POD#1-POD#3 for hemoperitoneum and hemorrhagic shock requiring 3uPRBC re-presenting with diarrhea, fevers, chills and clinical and radiographic evidence of infected hematoma requiring IV abx. Patient afebrile on admission and diarrhea is improved on IV vancomycin and zosyn. A/P: 65y POD#9 s/p b/l oophorectomy requiring admission POD#1-POD#3 for hemoperitoneum/hemorrhagic shock requiring 3uPRBC readmitted again on POD#8 with diarrhea, subjective fevers, found to have small collections at umbilical and LLQ port sites concerning for possibly infected hematomas.  Patient afebrile since admission with improvement in diarrhea overnight.

## 2020-08-06 NOTE — PROGRESS NOTE ADULT - PROBLEM SELECTOR PLAN 1
Neuro: PO pain meds.   CV: Hemodynamically stable, continuing home atorvastatin and amlodipine, holding parameters added  Pulm: Saturating well on room air, encourage ambulation  GI: Continue carbohydrate consistent diet  :  Voiding spontaneously   Heme: c/w HSQ and SCDs for DVT ppx  FEN: SLIV replete electrolytes prn   ID: Afebrile, AM CBC w/diff, continue with IV vanc/zosyn, f/u on stool PCR (unstable to send c.diff because stools are now formed)  Endo: per medicine, patient does not have hx of DMT2, d/c low ISS, d/c metformin, but recommended FSS for the day . pt aware of adrenal adenoma and following with outpt Endo -  to bring records  Dispo: Continue routine post-op care    Plan to be d/w Dr. Mehrdad Jones PGY1 Neuro: PO pain meds.   CV: Hemodynamically stable, continuing home atorvastatin and amlodipine  Pulm: Saturating well on room air, encourage ambulation  GI: Continue carbohydrate consistent diet, SLIV  :  Voiding spontaneously   Heme: c/w HSQ and SCDs for DVT ppx  ID: Afebrile, AM CBC w/diff, continue with IV vanc/zosyn, f/u on stool PCR (unable to send c.diff because stools are now formed)  Endo: per medicine, patient does not have hx of DMT2, d/c low ISS, d/c metformin, but recommended FSS for the day . pt aware of adrenal adenoma and following with outpt Endo -  to bring records  Dispo: Continue routine inpatient care    Michelle Jones PGY1    R4 GYN Progress Note Addendum  Pt seen at bedside by me and Dr. Jones who is a fluent [place] speaker and provided translation.  Pt clinically improving, afebrile overnight w/ VSS.  AM CBC w/ downtrending leukocytosis, small drop in Hb.  Will saline lock and repeat cbc at 12p.  Will f/u cultures and consider ID consult if no clinical improvement.  Continue zosyn/vanc for now.  Appreciate internal medicine consult.    d/w Dr. Mehrdad Santos MD PGY4

## 2020-08-07 LAB
BASOPHILS # BLD AUTO: 0.03 K/UL — SIGNIFICANT CHANGE UP (ref 0–0.2)
BASOPHILS NFR BLD AUTO: 0.5 % — SIGNIFICANT CHANGE UP (ref 0–2)
EOSINOPHIL # BLD AUTO: 0.1 K/UL — SIGNIFICANT CHANGE UP (ref 0–0.5)
EOSINOPHIL NFR BLD AUTO: 1.5 % — SIGNIFICANT CHANGE UP (ref 0–6)
HCT VFR BLD CALC: 29 % — LOW (ref 34.5–45)
HGB BLD-MCNC: 9.3 G/DL — LOW (ref 11.5–15.5)
IMM GRANULOCYTES NFR BLD AUTO: 1.5 % — SIGNIFICANT CHANGE UP (ref 0–1.5)
LYMPHOCYTES # BLD AUTO: 1.54 K/UL — SIGNIFICANT CHANGE UP (ref 1–3.3)
LYMPHOCYTES # BLD AUTO: 23.5 % — SIGNIFICANT CHANGE UP (ref 13–44)
MCHC RBC-ENTMCNC: 30.5 PG — SIGNIFICANT CHANGE UP (ref 27–34)
MCHC RBC-ENTMCNC: 32.1 GM/DL — SIGNIFICANT CHANGE UP (ref 32–36)
MCV RBC AUTO: 95.1 FL — SIGNIFICANT CHANGE UP (ref 80–100)
MONOCYTES # BLD AUTO: 0.65 K/UL — SIGNIFICANT CHANGE UP (ref 0–0.9)
MONOCYTES NFR BLD AUTO: 9.9 % — SIGNIFICANT CHANGE UP (ref 2–14)
NEUTROPHILS # BLD AUTO: 4.13 K/UL — SIGNIFICANT CHANGE UP (ref 1.8–7.4)
NEUTROPHILS NFR BLD AUTO: 63.1 % — SIGNIFICANT CHANGE UP (ref 43–77)
NRBC # BLD: 0 /100 WBCS — SIGNIFICANT CHANGE UP (ref 0–0)
PLATELET # BLD AUTO: 338 K/UL — SIGNIFICANT CHANGE UP (ref 150–400)
RBC # BLD: 3.05 M/UL — LOW (ref 3.8–5.2)
RBC # FLD: 15.9 % — HIGH (ref 10.3–14.5)
WBC # BLD: 6.55 K/UL — SIGNIFICANT CHANGE UP (ref 3.8–10.5)
WBC # FLD AUTO: 6.55 K/UL — SIGNIFICANT CHANGE UP (ref 3.8–10.5)

## 2020-08-07 PROCEDURE — 99231 SBSQ HOSP IP/OBS SF/LOW 25: CPT | Mod: GC

## 2020-08-07 PROCEDURE — 99233 SBSQ HOSP IP/OBS HIGH 50: CPT

## 2020-08-07 RX ORDER — HEPARIN SODIUM 5000 [USP'U]/ML
5000 INJECTION INTRAVENOUS; SUBCUTANEOUS EVERY 8 HOURS
Refills: 0 | Status: DISCONTINUED | OUTPATIENT
Start: 2020-08-07 | End: 2020-08-09

## 2020-08-07 RX ORDER — ACETAMINOPHEN 500 MG
975 TABLET ORAL ONCE
Refills: 0 | Status: COMPLETED | OUTPATIENT
Start: 2020-08-07 | End: 2020-08-07

## 2020-08-07 RX ORDER — VANCOMYCIN HCL 1 G
1250 VIAL (EA) INTRAVENOUS EVERY 12 HOURS
Refills: 0 | Status: DISCONTINUED | OUTPATIENT
Start: 2020-08-07 | End: 2020-08-07

## 2020-08-07 RX ORDER — LACTOBACILLUS ACIDOPHILUS 100MM CELL
1 CAPSULE ORAL DAILY
Refills: 0 | Status: DISCONTINUED | OUTPATIENT
Start: 2020-08-07 | End: 2020-08-09

## 2020-08-07 RX ORDER — ACETAMINOPHEN 500 MG
650 TABLET ORAL ONCE
Refills: 0 | Status: COMPLETED | OUTPATIENT
Start: 2020-08-07 | End: 2020-08-07

## 2020-08-07 RX ADMIN — Medication 975 MILLIGRAM(S): at 13:38

## 2020-08-07 RX ADMIN — Medication 975 MILLIGRAM(S): at 13:08

## 2020-08-07 RX ADMIN — HEPARIN SODIUM 5000 UNIT(S): 5000 INJECTION INTRAVENOUS; SUBCUTANEOUS at 06:37

## 2020-08-07 RX ADMIN — ATORVASTATIN CALCIUM 10 MILLIGRAM(S): 80 TABLET, FILM COATED ORAL at 22:19

## 2020-08-07 RX ADMIN — PANTOPRAZOLE SODIUM 40 MILLIGRAM(S): 20 TABLET, DELAYED RELEASE ORAL at 06:37

## 2020-08-07 RX ADMIN — Medication 650 MILLIGRAM(S): at 19:32

## 2020-08-07 RX ADMIN — HEPARIN SODIUM 5000 UNIT(S): 5000 INJECTION INTRAVENOUS; SUBCUTANEOUS at 22:19

## 2020-08-07 RX ADMIN — Medication 650 MILLIGRAM(S): at 20:02

## 2020-08-07 RX ADMIN — HEPARIN SODIUM 5000 UNIT(S): 5000 INJECTION INTRAVENOUS; SUBCUTANEOUS at 13:09

## 2020-08-07 RX ADMIN — Medication 166.67 MILLIGRAM(S): at 00:38

## 2020-08-07 RX ADMIN — PIPERACILLIN AND TAZOBACTAM 25 GRAM(S): 4; .5 INJECTION, POWDER, LYOPHILIZED, FOR SOLUTION INTRAVENOUS at 06:37

## 2020-08-07 RX ADMIN — Medication 1 TABLET(S): at 13:08

## 2020-08-07 NOTE — PROGRESS NOTE ADULT - SUBJECTIVE AND OBJECTIVE BOX
[facility] Division of Hospital Medicine  Vivek Diaz MD  Pager (M-F, 8A-5P): [phone]  Other Times:  [phone]    Patient is a 65y old  Female who presents with a chief complaint of fever and diarrhea in the setting of recent surgery (07 Aug 2020 06:58)    Encounter conducted via [employer] interpre[employer].    SUBJECTIVE / OVERNIGHT EVENTS:  Off antibiotics. Continues to have frequent loose stools-> 9 times yesterday, once overnight, once this AM. No pain, n/v. No bleeding. No abdominal pain. No fever. Had subjective fever overnight. No sob. Decreased PO but tolerating.    ADDITIONAL REVIEW OF SYSTEMS:  No cp, sob. No new skin changes.    MEDICATIONS  (STANDING):  amLODIPine   Tablet 5 milliGRAM(s) Oral daily  atorvastatin 10 milliGRAM(s) Oral at bedtime  heparin   Injectable 5000 Unit(s) SubCutaneous every 8 hours  lactobacillus acidophilus 1 Tablet(s) Oral daily  pantoprazole    Tablet 40 milliGRAM(s) Oral before breakfast    MEDICATIONS  (PRN):  glucagon  Injectable 1 milliGRAM(s) IntraMuscular once PRN Glucose LESS THAN 70 milligrams/deciliter      CAPILLARY BLOOD GLUCOSE        I&O's Summary    06 Aug 2020 07:01  -  07 Aug 2020 07:00  --------------------------------------------------------  IN: 1460 mL / OUT: 0 mL / NET: 1460 mL    07 Aug 2020 07:01  -  07 Aug 2020 12:04  --------------------------------------------------------  IN: 280 mL / OUT: 0 mL / NET: 280 mL        PHYSICAL EXAM:  Vital Signs Last 24 Hrs  T(C): 37.2 (07 Aug 2020 09:30), Max: 37.9 (06 Aug 2020 14:39)  T(F): 99 (07 Aug 2020 09:30), Max: 100.3 (06 Aug 2020 14:39)  HR: 92 (07 Aug 2020 09:30) (80 - 95)  BP: 111/76 (07 Aug 2020 09:30) (108/62 - 125/81)  BP(mean): --  RR: 17 (07 Aug 2020 09:30) (16 - 18)  SpO2: 96% (07 Aug 2020 09:30) (95% - 97%)  CONSTITUTIONAL: NAD, well-developed, well-groomed  EYES: EOMI; conjunctiva and sclera clear  ENMT: Moist oral mucosa, no pharyngeal injection or exudates; normal dentition  NECK: Supple, no palpable masses  RESPIRATORY: Normal respiratory effort; lungs are clear to auscultation bilaterally  CARDIOVASCULAR: Regular rate and rhythm, normal S1 and S2, no murmur/rub/gallop; No lower extremity edema; Peripheral pulses are 2+ bilaterally  ABDOMEN: soft, Nontender to palpation, normoactive bowel sounds, no rebound/guarding; large ecchymoses across abd wall  PSYCH: A+O to person, place, and time; affect appropriate  NEUROLOGY: Full oriented; no gross sensory deficits, moving all extremities.  SKIN: No rashes; no palpable lesions. Ecchymoses as above.    LABS:                        9.3    6.55  )-----------( 338      ( 07 Aug 2020 06:03 )             29.0                     GI PCR Panel, Stool (collected 06 Aug 2020 11:18)  Source: .Stool Feces, shan garner  Final Report (06 Aug 2020 13:42):    GI PCR Results: NOT detected    *******Please Note:*******    GI panel PCR evaluates for:    Campylobacter, Plesiomonas shigelloides, Salmonella,    Vibrio, Yersinia enterocolitica, Enteroaggregative    Escherichia coli (EAEC), Enteropathogenic E.coli (EPEC),    Enterotoxigenic E. coli (ETEC) lt/st, Shiga-like    toxin-producing E. coli (STEC) stx1/stx2,    Shigella/ Enteroinvasive E. coli (EIEC), Cryptosporidium,    Cyclospora cayetanensis, Entamoeba histolytica,    Giardia lamblia, Adenovirus F 40/41, Astrovirus,    Norovirus GI/GII, Rotavirus A, Sapovirus    Culture - Blood (collected 05 Aug 2020 10:03)  Source: .Blood Blood-Peripheral  Preliminary Report (06 Aug 2020 11:01):    No growth to date.    Culture - Blood (collected 05 Aug 2020 10:03)  Source: .Blood Blood-Peripheral  Preliminary Report (06 Aug 2020 11:01):    No growth to date.    Culture - Urine (collected 05 Aug 2020 10:00)  Source: .Urine Clean Catch (Midstream)  Final Report (06 Aug 2020 07:43):    <10,000 CFU/mL Normal Urogenital Dari        RADIOLOGY & ADDITIONAL TESTS:  CXR reviewed personally. Clear lungs.    CTAP:   < from: CT Abdomen and Pelvis w/ IV Cont (08.05.20 @ 08:40) >  IMPRESSION:  Interval decrease in size of the hemoperitoneum since 07/31/2020, now small.    Two new higher than fluid attenuation collections in the low ventral abdominal wall measuring up to 3.9 cm on the left, possibly hematoma. Infection cannot be excluded.    A 3.7 cm right adrenal adenoma.    < end of copied text >    Electrocardiogram Personally Reviewed:    COORDINATION OF CARE:  Care Discussed with Consultants/Other Providers [Y/N]:  Prior or Outpatient Records Reviewed [Y]: reviewed records of prior admission [facility] Division of Hospital Medicine  Vivek Diaz MD  Pager (M-F, 8A-5P): [phone]  Other Times:  [phone]    Patient is a 65y old  Female who presents with a chief complaint of fever and diarrhea in the setting of recent surgery (07 Aug 2020 06:58)    Encounter conducted via [employer] interpre[employer].    SUBJECTIVE / OVERNIGHT EVENTS:  Off antibiotics. Continues to have frequent loose stools-> 9 times yesterday, once overnight, once this AM. No pain, n/v. No bleeding. No abdominal pain. No fever. Had subjective fever overnight. No sob. Decreased PO but tolerating.    ADDITIONAL REVIEW OF SYSTEMS:  No cp, sob. No new skin changes.    MEDICATIONS  (STANDING):  amLODIPine   Tablet 5 milliGRAM(s) Oral daily  atorvastatin 10 milliGRAM(s) Oral at bedtime  heparin   Injectable 5000 Unit(s) SubCutaneous every 8 hours  lactobacillus acidophilus 1 Tablet(s) Oral daily  pantoprazole    Tablet 40 milliGRAM(s) Oral before breakfast    MEDICATIONS  (PRN):  glucagon  Injectable 1 milliGRAM(s) IntraMuscular once PRN Glucose LESS THAN 70 milligrams/deciliter      CAPILLARY BLOOD GLUCOSE        I&O's Summary    06 Aug 2020 07:01  -  07 Aug 2020 07:00  --------------------------------------------------------  IN: 1460 mL / OUT: 0 mL / NET: 1460 mL    07 Aug 2020 07:01  -  07 Aug 2020 12:04  --------------------------------------------------------  IN: 280 mL / OUT: 0 mL / NET: 280 mL        PHYSICAL EXAM:  Vital Signs Last 24 Hrs  T(C): 37.2 (07 Aug 2020 09:30), Max: 37.9 (06 Aug 2020 14:39)  T(F): 99 (07 Aug 2020 09:30), Max: 100.3 (06 Aug 2020 14:39)  HR: 92 (07 Aug 2020 09:30) (80 - 95)  BP: 111/76 (07 Aug 2020 09:30) (108/62 - 125/81)  BP(mean): --  RR: 17 (07 Aug 2020 09:30) (16 - 18)  SpO2: 96% (07 Aug 2020 09:30) (95% - 97%)  CONSTITUTIONAL: NAD, well-developed, well-groomed  EYES: EOMI; conjunctiva and sclera clear  ENMT: Moist oral mucosa, no pharyngeal injection or exudates; normal dentition  NECK: Supple, no palpable masses  RESPIRATORY: Normal respiratory effort; lungs are clear to auscultation bilaterally  CARDIOVASCULAR: Regular rate and rhythm, normal S1 and S2, no murmur/rub/gallop; No lower extremity edema; Peripheral pulses are 2+ bilaterally  ABDOMEN: soft, Nontender to palpation, normoactive bowel sounds, no rebound/guarding; large ecchymoses across abd wall  PSYCH: A+O to person, place, and time; affect appropriate  NEUROLOGY: Full oriented; no gross sensory deficits, moving all extremities.  SKIN: No rashes; no palpable lesions. Ecchymoses as above.    LABS:                        9.3    6.55  )-----------( 338      ( 07 Aug 2020 06:03 )             29.0                     GI PCR Panel, Stool (collected 06 Aug 2020 11:18)  Source: .Stool Feces, shan garner  Final Report (06 Aug 2020 13:42):    GI PCR Results: NOT detected    *******Please Note:*******    GI panel PCR evaluates for:    Campylobacter, Plesiomonas shigelloides, Salmonella,    Vibrio, Yersinia enterocolitica, Enteroaggregative    Escherichia coli (EAEC), Enteropathogenic E.coli (EPEC),    Enterotoxigenic E. coli (ETEC) lt/st, Shiga-like    toxin-producing E. coli (STEC) stx1/stx2,    Shigella/ Enteroinvasive E. coli (EIEC), Cryptosporidium,    Cyclospora cayetanensis, Entamoeba histolytica,    Giardia lamblia, Adenovirus F 40/41, Astrovirus,    Norovirus GI/GII, Rotavirus A, Sapovirus    Culture - Blood (collected 05 Aug 2020 10:03)  Source: .Blood Blood-Peripheral  Preliminary Report (06 Aug 2020 11:01):    No growth to date.    Culture - Blood (collected 05 Aug 2020 10:03)  Source: .Blood Blood-Peripheral  Preliminary Report (06 Aug 2020 11:01):    No growth to date.    Culture - Urine (collected 05 Aug 2020 10:00)  Source: .Urine Clean Catch (Midstream)  Final Report (06 Aug 2020 07:43):    <10,000 CFU/mL Normal Urogenital Dari        RADIOLOGY & ADDITIONAL TESTS:  CXR reviewed personally. Clear lungs.    CTAP:   < from: CT Abdomen and Pelvis w/ IV Cont (08.05.20 @ 08:40) >  IMPRESSION:  Interval decrease in size of the hemoperitoneum since 07/31/2020, now small.    Two new higher than fluid attenuation collections in the low ventral abdominal wall measuring up to 3.9 cm on the left, possibly hematoma. Infection cannot be excluded.    A 3.7 cm right adrenal adenoma.    < end of copied text >    Electrocardiogram Personally Reviewed: NSR    COORDINATION OF CARE:  Prior or Outpatient Records Reviewed [Y]: reviewed records of prior admission

## 2020-08-07 NOTE — PROGRESS NOTE ADULT - ASSESSMENT
65F with PMH rheumatoid arthritis,  HLD, GERD, and HTN and post menopausal cysts s/p laparoscopic b/l salpingo-oophorectomy (7/28) c/b hemoperitoneum warranting admission 7/29-7/31 stabilized with 3u PRBC, now presenting with fevers and diarrhea concerning for sepsis 2/2 infected hematoma vs infectious diarrhea vs reactive to large hematoma.

## 2020-08-07 NOTE — PROGRESS NOTE ADULT - PROBLEM SELECTOR PLAN 1
Neuro: PO pain meds.   CV: Hemodynamically stable  Pulm: Saturating well on room air, encourage ambulation  GI: Continue regular diet  : Voiding spontaneously   Heme: c/w HSQ and SCDs for DVT ppx  FEN: SLIV.  replete electrolytes prn, start probiotics  ID: afebrile, will d/c vanc/zosyn , kept on for 48 hrs,  stool PCR (8/6 neg), blood cx and urine cx NGTD. vanc trough 8.1, vanc dose increased 1250 mg q12  Endo: No active issues     Dispo: D/c antibiotics and monitor clinically.    Michelle oJnes PGY1 Neuro: PO pain meds.   CV: Hemodynamically stable  Pulm: Saturating well on room air, encourage ambulation  GI: Continue regular diet  : Voiding spontaneously   Heme: c/w HSQ and SCDs for DVT ppx  FEN: SLIV.  replete electrolytes prn, start probiotics  ID: afebrile, will d/c vanc/zosyn , kept on for 48 hrs,  stool PCR (8/6 neg), blood cx and urine cx NGTD. vanc trough 8.1, vanc dose increased 1250 mg q12 overnight  Endo: No active issues     Dispo: D/c antibiotics and monitor clinically.    Michelle Jones PGY1 Neuro: PO pain meds as needed; pt w/o pain at this time  CV: Hemodynamically stable, f/u am cbc, vs q4h  Pulm: Saturating well on room air, encourage ambulation  GI: Continue regular diet, SLIV, dark soft stool likely related to reabsorption of large hemoperitoneum; start probiotics per pt request  : Voiding spontaneously with excellent uop  Heme: c/w HSQ and SCDs for DVT ppx  ID: afebrile since admission, will d/c vanc/zosyn, stool PCR (8/6 neg), blood cx and urine cx NGTD.  If pt spikes will plan for cxr, pan culture and possible ID consult.  Endo:  pt denies hx of T2DM, A1C 5.4, does not take metformin at home  Dispo: D/c antibiotics and monitor clinically.    Michelle Jones PGY1    R4 GYN Addendum  Pt seen and evaluated at bedside with GYN Team, [place] translation provided by PGY1 Robert who is a fluent speaker.  Pt is clinically stable/improving however she is very distraught that she isn't feeling better faster.  Counselled pt that it will take time for her body to reabsorb the very large hemoperitoneum she experienced after her surgery and that she will slowly feel better with time.  Advised pt that at this time she has no evidence of infection on labs/cultures/vital signs and that we will continue to watch her closely for any signs of infection.  She denies any pain at this time and will d/c prn pain meds.  D/c Vanc/zosyn now s/p 48h of abx.  Will get cxr/pan cultures and restart abx if pt spikes.  Continue inpt monitoring today.  ARLENE Santos MD PGY4

## 2020-08-07 NOTE — PROGRESS NOTE ADULT - ASSESSMENT
A/P:  65y POD#10 s/p b/l oophorectomy requiring admission POD#1-POD#3 for hemoperitoneum/hemorrhagic shock requiring 3uPRBC readmitted again on POD#8 with diarrhea, subjective fevers, found to have small collections at umbilical and LLQ port sites concerning for possibly infected hematomas.  Patient afebrile since admission with improvement in diarrhea overnight.  No evidence of infection/drainage at this time. Repeat CT A/P demonstrates small complex free fluid in the pelvic, decrease in hematoma size. Subutaneous heterogeneity noted. Pt's clinical presentation likely 2/2 superinfected pelvic hematoma. No e/o active bleeding and H/H stable. Leukocytosis now resolved. 65y POD#10 s/p b/l oophorectomy requiring admission POD#1-POD#3 for hemoperitoneum/hemorrhagic shock requiring 3uPRBC readmitted again on POD#8 with diarrhea, subjective fevers, found to have small collections at umbilical and LLQ port sites concerning for possibly infected hematomas.  Patient afebrile since admission with improvement in diarrhea overnight.  No evidence of infection/drainage at this time. Repeat CT A/P demonstrates small complex free fluid in the pelvic, decrease in hematoma size.  Subutaneous heterogeneity noted.  No e/o active bleeding and H/H stable. Leukocytosis now resolved.  Pt's ssx likely result of continued absorption of large hemoperitoneum.  No focal signs of infection.

## 2020-08-07 NOTE — PROGRESS NOTE ADULT - ATTENDING COMMENTS
GYN ATTENDING/MIGS FELLOW PROGRESS NOTE  -delayed entry due to direct patient care-  I have seen and evaluated the patient, read the above note, and agree with resident assessment and plan with the following additions/exceptions:     Ms. Reynolds is a Welsh-speaking postmenopausal 66yo now HD#3 readmitted for workup of subjective fevers at home in the setting of known hemoperitoneum. She remains afebrile overnight with normal vital signs. Exam with benign abdomen and resolving ecchymosis of the flanks and lower abdomen. No erythema or drainage. No bleeding, intact 2 port sites. Labs without leukocytosis and notable for stable H/H. Given that patient has been afebrile since admission on Vanc/Zosyn, it is reasonable to discontinue her abx at this time. Suspect that her hematoma may not be superinfected, however low threshold to complete course of abx.    - Will monitor off abx for now  - If febrile, plan for pan culture, CXR, and LE dopplers  - Continue Heparin ppx  - Hold acetaminophen as pt not in pain    DEEDEE Cronin  PGY5   MIGS Fellow   GYN Attending

## 2020-08-07 NOTE — PROGRESS NOTE ADULT - PROBLEM SELECTOR PLAN 1
Patient with recent elective lap b/l salpingo-oopherectomy on 7/29 for ovarian cysts c/b hemoperitoneum w/ shock (admission 7/29-7/30) treated with supposrtive transfusion who now returns with fevers concerning for superimposing infection  - s/p vanc/zosyn 8/5-8/6  - per IR and surgery, no indication for drainage given concern for further bleeding  - cxs negative  - now afebrile off abx w/ resolved leukocytosis  - would monitor 48hrs off abx. Low threshold to complete 5 day course of abx for intraabdominal infection . Consider ID consult if fevers or signs of infection return.  -Consider repeat imagining 2-4 weeks as outpatient to document resolution of collections.

## 2020-08-07 NOTE — PROGRESS NOTE ADULT - PROBLEM SELECTOR PLAN 6
-GI PCR negative, suspect 2/2 hemoperitoneum  -encourage PO intake  -low threshold to complete course of abx as above if infectious symptoms recur

## 2020-08-07 NOTE — PROGRESS NOTE ADULT - SUBJECTIVE AND OBJECTIVE BOX
R1 SON Progress Note    POD#10   HD#3    Patient seen and examined at bedside.  No acute events overnight. Patient is very concerned that she is not getting better. She continues to have black loose stools (9 episodes overnight described as black yogurt) and subjective fevers. Also complaining of head heaviness. She denies any CP, SOB, N/V, leg pain.      Vital Signs Last 24 Hours  T(C): 37.6 (08-07-20 @ 05:28), Max: 37.9 (08-06-20 @ 14:39)  HR: 83 (08-07-20 @ 05:28) (80 - 95)  BP: 109/72 (08-07-20 @ 05:28) (108/62 - 125/81)  RR: 18 (08-07-20 @ 05:28) (16 - 18)  SpO2: 95% (08-07-20 @ 05:28) (95% - 97%)    I&O's Summary    05 Aug 2020 07:01  -  06 Aug 2020 07:00  --------------------------------------------------------  IN: 160 mL / OUT: 0 mL / NET: 160 mL    06 Aug 2020 07:01  -  07 Aug 2020 06:59  --------------------------------------------------------  IN: 1360 mL / OUT: 0 mL / NET: 1360 mL        Physical Exam:  General: NAD  CV: RR, S1, S2  Lungs: CTA b/l, good air flow b/l   Abdomen:  Soft, non- tender to palpation diffusely, normoactive bowel sounds, +grey ge sign  Incision: 3 LSC site w/ surrounding ecchymosis, non-tender, skin at incisions c/d/i w/o erythema/edema/warmth  Ext: No pain or swelling     Labs:                        9.3    6.55  )-----------( 338      ( 07 Aug 2020 06:03 )             29.0   baso 0.5    eos 1.5    imm gran 1.5    lymph 23.5   mono 9.9    poly 63.1                         9.8    8.98  )-----------( 347      ( 06 Aug 2020 12:38 )             30.5   baso x      eos x      imm gran x      lymph x      mono x      poly x                            9.5    7.94  )-----------( 302      ( 06 Aug 2020 07:18 )             29.4   baso 0.4    eos 1.4    imm gran 0.9    lymph 15.6   mono 10.5   poly 71.2                         10.9   13.08 )-----------( 328      ( 05 Aug 2020 05:44 )             34.0   baso 0.4    eos 0.8    imm gran 1.3    lymph 13.6   mono 10.3   poly 73.6       MEDICATIONS  (STANDING):  acetaminophen   Tablet .. 975 milliGRAM(s) Oral every 6 hours  amLODIPine   Tablet 5 milliGRAM(s) Oral daily  atorvastatin 10 milliGRAM(s) Oral at bedtime  heparin   Injectable 5000 Unit(s) SubCutaneous every 8 hours  pantoprazole    Tablet 40 milliGRAM(s) Oral before breakfast  piperacillin/tazobactam IVPB.. 3.375 Gram(s) IV Intermittent every 8 hours  vancomycin  IVPB 1250 milliGRAM(s) IV Intermittent every 12 hours    MEDICATIONS  (PRN):  glucagon  Injectable 1 milliGRAM(s) IntraMuscular once PRN Glucose LESS THAN 70 milligrams/deciliter R1 SON Progress Note    POD#10   HD#3    Patient seen and examined at bedside.  No acute events overnight. Patient is very concerned that she is not getting better and requests ID consult. She says yesterday afernoon her stools turned back to dark and soft with 9 episodes overnight described as black yogurt and subjective fevers. Tmax overnight 37.9C. She feels fatigued, depressed and c/o head heaviness and poor appetite.  She is tolerating regular diet.  She is ambulating at baseline, passing flatus and voiding spontaneously. She denies any dizziness, CP, SOB, N/V, leg pain.    Vital Signs Last 24 Hours  T(C): 37.6 (08-07-20 @ 05:28), Max: 37.9 (08-06-20 @ 14:39)  HR: 83 (08-07-20 @ 05:28) (80 - 95)  BP: 109/72 (08-07-20 @ 05:28) (108/62 - 125/81)  RR: 18 (08-07-20 @ 05:28) (16 - 18)  SpO2: 95% (08-07-20 @ 05:28) (95% - 97%)    I&O's Summary    05 Aug 2020 07:01  -  06 Aug 2020 07:00  --------------------------------------------------------  IN: 160 mL / OUT: 0 mL / NET: 160 mL    06 Aug 2020 07:01  -  07 Aug 2020 06:59  --------------------------------------------------------  IN: 1360 mL / OUT: 0 mL / NET: 1360 mL    Physical Exam:  General: NAD  CV: RRR  Lungs: CTAB  Abdomen:  Soft, non- tender to palpation, normoactive bowel sounds, +grey ge/ila sign  Incision: 3 LSC site w/ surrounding ecchymosis, non-tender, skin at incisions c/d/i w/o erythema/edema/warmth  Ext: No pain or swelling     Labs:                        9.3    6.55  )-----------( 338      ( 07 Aug 2020 06:03 )             29.0   baso 0.5    eos 1.5    imm gran 1.5    lymph 23.5   mono 9.9    poly 63.1                         9.8    8.98  )-----------( 347      ( 06 Aug 2020 12:38 )             30.5                              9.5    7.94  )-----------( 302      ( 06 Aug 2020 07:18 )             29.4   baso 0.4    eos 1.4    imm gran 0.9    lymph 15.6   mono 10.5   poly 71.2                         10.9   13.08 )-----------( 328      ( 05 Aug 2020 05:44 )             34.0   baso 0.4    eos 0.8    imm gran 1.3    lymph 13.6   mono 10.3   poly 73.6       MEDICATIONS  (STANDING):  acetaminophen   Tablet .. 975 milliGRAM(s) Oral every 6 hours  amLODIPine   Tablet 5 milliGRAM(s) Oral daily  atorvastatin 10 milliGRAM(s) Oral at bedtime  heparin   Injectable 5000 Unit(s) SubCutaneous every 8 hours  pantoprazole    Tablet 40 milliGRAM(s) Oral before breakfast  piperacillin/tazobactam IVPB.. 3.375 Gram(s) IV Intermittent every 8 hours  vancomycin  IVPB 1250 milliGRAM(s) IV Intermittent every 12 hours    MEDICATIONS  (PRN):  glucagon  Injectable 1 milliGRAM(s) IntraMuscular once PRN Glucose LESS THAN 70 milligrams/deciliter

## 2020-08-07 NOTE — CHART NOTE - NSCHARTNOTEFT_GEN_A_CORE
R1 Chart Note    Saw and evaluated patient at bedside. Patient still reports a headache, only partially improved with tylenol. Only two further episodes of diarrhea throughout the day. Denies any CP, SOB, cough, vision changes, leg swelling or pain, or increased abdominal pain.     Vital Signs Last 24 Hrs  T(C): 37.1 (07 Aug 2020 14:48), Max: 37.9 (06 Aug 2020 22:22)  T(F): 98.7 (07 Aug 2020 14:48), Max: 100.3 (06 Aug 2020 22:22)  HR: 73 (07 Aug 2020 14:48) (73 - 95)  BP: 128/79 (07 Aug 2020 14:48) (108/62 - 128/79)  BP(mean): --  RR: 16 (07 Aug 2020 14:48) (16 - 18)  SpO2: 92% (07 Aug 2020 14:48) (92% - 97%)    PE:    General: NAD  Lungs: CTAB  CVS: RRR  Abd: Soft, non-tender, unchanged from morning      Assessment:  65y POD#10 s/p b/l oophorectomy requiring admission POD#1-POD#3 for hemoperitoneum/hemorrhagic shock requiring 3uPRBC readmitted again on POD#8 with diarrhea, subjective fevers, found to have small collections at umbilical and LLQ port sites concerning for possibly infected hematomas. Patient remains afebrile after d/c antibiotics.     Plan:  - continue with PO analgesia for headache  - Continue to monitor for signs of infection    Michelle Jones  PGY -1

## 2020-08-08 LAB
HCT VFR BLD CALC: 29.4 % — LOW (ref 34.5–45)
HGB BLD-MCNC: 9.3 G/DL — LOW (ref 11.5–15.5)
MCHC RBC-ENTMCNC: 30.6 PG — SIGNIFICANT CHANGE UP (ref 27–34)
MCHC RBC-ENTMCNC: 31.6 GM/DL — LOW (ref 32–36)
MCV RBC AUTO: 96.7 FL — SIGNIFICANT CHANGE UP (ref 80–100)
NRBC # BLD: 0 /100 WBCS — SIGNIFICANT CHANGE UP (ref 0–0)
PLATELET # BLD AUTO: 372 K/UL — SIGNIFICANT CHANGE UP (ref 150–400)
RBC # BLD: 3.04 M/UL — LOW (ref 3.8–5.2)
RBC # FLD: 15.9 % — HIGH (ref 10.3–14.5)
WBC # BLD: 6.41 K/UL — SIGNIFICANT CHANGE UP (ref 3.8–10.5)
WBC # FLD AUTO: 6.41 K/UL — SIGNIFICANT CHANGE UP (ref 3.8–10.5)

## 2020-08-08 RX ORDER — ACETAMINOPHEN 500 MG
975 TABLET ORAL EVERY 6 HOURS
Refills: 0 | Status: DISCONTINUED | OUTPATIENT
Start: 2020-08-08 | End: 2020-08-09

## 2020-08-08 RX ORDER — ACETAMINOPHEN 500 MG
975 TABLET ORAL ONCE
Refills: 0 | Status: COMPLETED | OUTPATIENT
Start: 2020-08-08 | End: 2020-08-08

## 2020-08-08 RX ADMIN — PANTOPRAZOLE SODIUM 40 MILLIGRAM(S): 20 TABLET, DELAYED RELEASE ORAL at 05:38

## 2020-08-08 RX ADMIN — Medication 975 MILLIGRAM(S): at 06:30

## 2020-08-08 RX ADMIN — Medication 975 MILLIGRAM(S): at 17:47

## 2020-08-08 RX ADMIN — ATORVASTATIN CALCIUM 10 MILLIGRAM(S): 80 TABLET, FILM COATED ORAL at 21:50

## 2020-08-08 RX ADMIN — Medication 975 MILLIGRAM(S): at 23:03

## 2020-08-08 RX ADMIN — HEPARIN SODIUM 5000 UNIT(S): 5000 INJECTION INTRAVENOUS; SUBCUTANEOUS at 14:14

## 2020-08-08 RX ADMIN — Medication 975 MILLIGRAM(S): at 05:56

## 2020-08-08 RX ADMIN — Medication 1 TABLET(S): at 14:14

## 2020-08-08 RX ADMIN — Medication 975 MILLIGRAM(S): at 17:17

## 2020-08-08 RX ADMIN — Medication 975 MILLIGRAM(S): at 22:33

## 2020-08-08 RX ADMIN — HEPARIN SODIUM 5000 UNIT(S): 5000 INJECTION INTRAVENOUS; SUBCUTANEOUS at 21:50

## 2020-08-08 RX ADMIN — HEPARIN SODIUM 5000 UNIT(S): 5000 INJECTION INTRAVENOUS; SUBCUTANEOUS at 05:37

## 2020-08-08 NOTE — PROGRESS NOTE ADULT - ASSESSMENT
65y POD#11 s/p b/l oophorectomy requiring admission POD#1-POD#3 for hemoperitoneum/hemorrhagic shock requiring 3uPRBC readmitted again on POD#8 with diarrhea, subjective fevers, found to have small collections at umbilical and LLQ port sites concerning for possibly infected hematomas.  Patient afebrile since admission with improvement in diarrhea overnight.  No evidence of infection/drainage at this time. Repeat CT A/P demonstrates small complex free fluid in the pelvic, decrease in hematoma size.  Subcutaneous heterogeneity noted.  No e/o active bleeding and H/H stable. Leukocytosis now resolved.  Pt's ssx likely result of continued absorption of large hemoperitoneum.  No focal signs of infection.

## 2020-08-08 NOTE — PROGRESS NOTE ADULT - ATTENDING COMMENTS
ATTG note    HD#4 after readmission for fevers/Diarrhea, POD#8 s/p LSC BSO for adnexal masses at [facility].  Pt was admitted to [facility] on POD#1 for tachycardia/anemia/possible shock with a hemoperitoneum.  ON this admission admitted for concern for infected hematoma.  Pt counseled by IR and Gen surg and no surgical interventions warranted.  Pt started on IV abx Vanc/Zosyn that was dc one day ago since pt was afebrile.    Pt seen at the bedside.  Reports feeling better today.  Denies any significant pain.  Loose stool x 2 today.  Tolerating reg diet but poor appetite.  No n/v.    VSS, afebrile.  Tc=37.6 last elevated temp - 100.3 (8/6 10:22p)  GEn-NAD, AAO  ABd-soft, nd, ecchymossis on lower abd, palpable indurated area periumbilical mildly tender  Ext-no calf tend    Labs - as above- WBC - 6, H/H-9/29 - same as on 8/7    HD#4 after readmission for possible infected hematomas s/p LSC BSO at [facility] POD#11 now.  Surgically afebrile since admission.  Most recent highest temp on 8/6 @ 1022p - s/p IV abx Vanc/Zosyn now.  Resolved Leukocytosis.  H/H stable.  Improvement in GI sxs.  Overall doing better clinically.  Cxs Neg to date.   -cont above mngt   -cont to monitor stool frequency, since has slowed down since admisison, consider dc tomorrow to f/u with GYN surgeon    NELA Cooper

## 2020-08-08 NOTE — PROGRESS NOTE ADULT - SUBJECTIVE AND OBJECTIVE BOX
[facility] Progress Note    POD#11   HD#4    Patient seen and examined at bedside.  No acute events overnight. Patient is very concerned that she is not getting better and requests ID consult. She says yesterday afernoon her stools turned back to dark and soft with 9 episodes overnight described as black yogurt and subjective fevers. Tmax overnight 37.7C. She feels fatigued, depressed and c/o head heaviness and poor appetite.  She is tolerating regular diet.  She is ambulating at baseline, passing flatus and voiding spontaneously. She denies any dizziness, CP, SOB, N/V, leg pain.    Vital Signs Last 24 Hours  T(C): 37.6 (08-07-20 @ 05:28), Max: 37.9 (08-06-20 @ 14:39)  HR: 83 (08-07-20 @ 05:28) (80 - 95)  BP: 109/72 (08-07-20 @ 05:28) (108/62 - 125/81)  RR: 18 (08-07-20 @ 05:28) (16 - 18)  SpO2: 95% (08-07-20 @ 05:28) (95% - 97%)    I&O's Summary    05 Aug 2020 07:01  -  06 Aug 2020 07:00  --------------------------------------------------------  IN: 160 mL / OUT: 0 mL / NET: 160 mL    06 Aug 2020 07:01  -  07 Aug 2020 06:59  --------------------------------------------------------  IN: 1360 mL / OUT: 0 mL / NET: 1360 mL    Physical Exam:  General: NAD  CV: RRR  Lungs: CTAB  Abdomen:  Soft, non- tender to palpation, normoactive bowel sounds, +grey ge/ila sign  Incision: 3 LSC site w/ surrounding ecchymosis, non-tender, skin at incisions c/d/i w/o erythema/edema/warmth  Ext: No pain or swelling     Labs:                        9.3    6.55  )-----------( 338      ( 07 Aug 2020 06:03 )             29.0   baso 0.5    eos 1.5    imm gran 1.5    lymph 23.5   mono 9.9    poly 63.1                         9.8    8.98  )-----------( 347      ( 06 Aug 2020 12:38 )             30.5                              9.5    7.94  )-----------( 302      ( 06 Aug 2020 07:18 )             29.4   baso 0.4    eos 1.4    imm gran 0.9    lymph 15.6   mono 10.5   poly 71.2                         10.9   13.08 )-----------( 328      ( 05 Aug 2020 05:44 )             34.0   baso 0.4    eos 0.8    imm gran 1.3    lymph 13.6   mono 10.3   poly 73.6       MEDICATIONS  (STANDING):  acetaminophen   Tablet .. 975 milliGRAM(s) Oral every 6 hours  amLODIPine   Tablet 5 milliGRAM(s) Oral daily  atorvastatin 10 milliGRAM(s) Oral at bedtime  heparin   Injectable 5000 Unit(s) SubCutaneous every 8 hours  pantoprazole    Tablet 40 milliGRAM(s) Oral before breakfast  piperacillin/tazobactam IVPB.. 3.375 Gram(s) IV Intermittent every 8 hours  vancomycin  IVPB 1250 milliGRAM(s) IV Intermittent every 12 hours    MEDICATIONS  (PRN):  glucagon  Injectable 1 milliGRAM(s) IntraMuscular once PRN Glucose LESS THAN 70 milligrams/deciliter [facility] Progress Note    POD#11   HD#4    Patient seen and examined at bedside.  No acute events overnight.  Patient reports feeling improved today.  Diarrhea x1 episode overnight, significant decrease in frequency from initial presentation.  Denies tactile fevers or chills. Tmax overnight 37.7C. She feels fatigued, depressed and c/o head heaviness and poor appetite.  She is tolerating regular diet. Headache improved w/ PO tylenol.  Denies focal visual, motor, or sensory defects associated w/ headache.  She is ambulating at baseline, passing flatus and voiding spontaneously. She denies any dizziness, CP, SOB, N/V, leg pain.    Vital Signs Last 24 Hours  T(C): 37.6 (08-07-20 @ 05:28), Max: 37.9 (08-06-20 @ 14:39)  HR: 83 (08-07-20 @ 05:28) (80 - 95)  BP: 109/72 (08-07-20 @ 05:28) (108/62 - 125/81)  RR: 18 (08-07-20 @ 05:28) (16 - 18)  SpO2: 95% (08-07-20 @ 05:28) (95% - 97%)    I&O's Summary    05 Aug 2020 07:01  -  06 Aug 2020 07:00  --------------------------------------------------------  IN: 160 mL / OUT: 0 mL / NET: 160 mL    06 Aug 2020 07:01  -  07 Aug 2020 06:59  --------------------------------------------------------  IN: 1360 mL / OUT: 0 mL / NET: 1360 mL    Physical Exam:  General: NAD  CV: RRR  Lungs: CTAB  Abdomen:  Soft, non- tender to palpation, normoactive bowel sounds, +grey ge/ila sign  Incision: 3 LSC site w/ surrounding ecchymosis, non-tender, skin at incisions c/d/i w/o erythema/edema/warmth  Ext: No pain or swelling     Labs:                        9.3    6.55  )-----------( 338      ( 07 Aug 2020 06:03 )             29.0   baso 0.5    eos 1.5    imm gran 1.5    lymph 23.5   mono 9.9    poly 63.1                         9.8    8.98  )-----------( 347      ( 06 Aug 2020 12:38 )             30.5                              9.5    7.94  )-----------( 302      ( 06 Aug 2020 07:18 )             29.4   baso 0.4    eos 1.4    imm gran 0.9    lymph 15.6   mono 10.5   poly 71.2                         10.9   13.08 )-----------( 328      ( 05 Aug 2020 05:44 )             34.0   baso 0.4    eos 0.8    imm gran 1.3    lymph 13.6   mono 10.3   poly 73.6     MEDICATIONS  (STANDING):  acetaminophen   Tablet .. 975 milliGRAM(s) Oral every 6 hours  amLODIPine   Tablet 5 milliGRAM(s) Oral daily  atorvastatin 10 milliGRAM(s) Oral at bedtime  heparin   Injectable 5000 Unit(s) SubCutaneous every 8 hours  pantoprazole    Tablet 40 milliGRAM(s) Oral before breakfast  piperacillin/tazobactam IVPB.. 3.375 Gram(s) IV Intermittent every 8 hours  vancomycin  IVPB 1250 milliGRAM(s) IV Intermittent every 12 hours    MEDICATIONS  (PRN):  glucagon  Injectable 1 milliGRAM(s) IntraMuscular once PRN Glucose LESS THAN 70 milligrams/deciliter

## 2020-08-08 NOTE — PROGRESS NOTE ADULT - PROBLEM SELECTOR PLAN 1
Neuro: PO pain meds as needed; pt w/o pain at this time  CV: Hemodynamically stable, f/u am cbc, vs q4h  Pulm: Saturating well on room air, encourage ambulation  GI: Continue regular diet, SLIV, dark soft stool likely related to reabsorption of large hemoperitoneum; start probiotics per pt request  : Voiding spontaneously with excellent uop  Heme: c/w HSQ and SCDs for DVT ppx  ID: afebrile since admission, will d/c vanc/zosyn, stool PCR (8/6 neg), blood cx and urine cx NGTD.  If pt spikes will plan for cxr, pan culture and possible ID consult.  Endo:  pt denies hx of T2DM, A1C 5.4, does not take metformin at home  Dispo: D/c antibiotics and monitor clinically.    Rosalba Felton PGY2 Neuro: PO pain meds as needed; pt w/o pain at this time  CV: Hemodynamically stable, f/u am cbc, vs q4h  Pulm: Saturating well on room air, encourage ambulation  GI: Continue regular diet, SLIV, dark soft stool likely related to reabsorption of large hemoperitoneum; start probiotics per pt request  : Voiding spontaneously with excellent uop  Heme: c/w HSQ and SCDs for DVT ppx  ID: afebrile since admission, will d/c vanc/zosyn, stool PCR (8/6 neg), blood cx and urine cx NGTD.  If pt spikes will plan for cxr, pan culture and possible ID consult.  Endo:  pt denies hx of T2DM, A1C 5.4, does not take metformin at home  Dispo: discharge planning    Rosalba Felton PGY2

## 2020-08-09 ENCOUNTER — TRANSCRIPTION ENCOUNTER (OUTPATIENT)
Age: 65
End: 2020-08-09

## 2020-08-09 VITALS
HEART RATE: 89 BPM | OXYGEN SATURATION: 99 % | SYSTOLIC BLOOD PRESSURE: 131 MMHG | DIASTOLIC BLOOD PRESSURE: 79 MMHG | TEMPERATURE: 99 F | RESPIRATION RATE: 18 BRPM

## 2020-08-09 LAB
BASOPHILS # BLD AUTO: 0.03 K/UL — SIGNIFICANT CHANGE UP (ref 0–0.2)
BASOPHILS NFR BLD AUTO: 0.5 % — SIGNIFICANT CHANGE UP (ref 0–2)
EOSINOPHIL # BLD AUTO: 0.11 K/UL — SIGNIFICANT CHANGE UP (ref 0–0.5)
EOSINOPHIL NFR BLD AUTO: 1.9 % — SIGNIFICANT CHANGE UP (ref 0–6)
HCT VFR BLD CALC: 31 % — LOW (ref 34.5–45)
HGB BLD-MCNC: 9.7 G/DL — LOW (ref 11.5–15.5)
IMM GRANULOCYTES NFR BLD AUTO: 1.4 % — SIGNIFICANT CHANGE UP (ref 0–1.5)
LYMPHOCYTES # BLD AUTO: 1.78 K/UL — SIGNIFICANT CHANGE UP (ref 1–3.3)
LYMPHOCYTES # BLD AUTO: 30.9 % — SIGNIFICANT CHANGE UP (ref 13–44)
MCHC RBC-ENTMCNC: 30.7 PG — SIGNIFICANT CHANGE UP (ref 27–34)
MCHC RBC-ENTMCNC: 31.3 GM/DL — LOW (ref 32–36)
MCV RBC AUTO: 98.1 FL — SIGNIFICANT CHANGE UP (ref 80–100)
MONOCYTES # BLD AUTO: 0.48 K/UL — SIGNIFICANT CHANGE UP (ref 0–0.9)
MONOCYTES NFR BLD AUTO: 8.3 % — SIGNIFICANT CHANGE UP (ref 2–14)
NEUTROPHILS # BLD AUTO: 3.28 K/UL — SIGNIFICANT CHANGE UP (ref 1.8–7.4)
NEUTROPHILS NFR BLD AUTO: 57 % — SIGNIFICANT CHANGE UP (ref 43–77)
NRBC # BLD: 0 /100 WBCS — SIGNIFICANT CHANGE UP (ref 0–0)
PLATELET # BLD AUTO: 355 K/UL — SIGNIFICANT CHANGE UP (ref 150–400)
RBC # BLD: 3.16 M/UL — LOW (ref 3.8–5.2)
RBC # FLD: 15.9 % — HIGH (ref 10.3–14.5)
WBC # BLD: 5.76 K/UL — SIGNIFICANT CHANGE UP (ref 3.8–10.5)
WBC # FLD AUTO: 5.76 K/UL — SIGNIFICANT CHANGE UP (ref 3.8–10.5)

## 2020-08-09 PROCEDURE — 93005 ELECTROCARDIOGRAM TRACING: CPT

## 2020-08-09 PROCEDURE — 85027 COMPLETE CBC AUTOMATED: CPT

## 2020-08-09 PROCEDURE — 80053 COMPREHEN METABOLIC PANEL: CPT

## 2020-08-09 PROCEDURE — 87086 URINE CULTURE/COLONY COUNT: CPT

## 2020-08-09 PROCEDURE — 86769 SARS-COV-2 COVID-19 ANTIBODY: CPT

## 2020-08-09 PROCEDURE — 82947 ASSAY GLUCOSE BLOOD QUANT: CPT

## 2020-08-09 PROCEDURE — 86901 BLOOD TYPING SEROLOGIC RH(D): CPT

## 2020-08-09 PROCEDURE — 71045 X-RAY EXAM CHEST 1 VIEW: CPT

## 2020-08-09 PROCEDURE — 82330 ASSAY OF CALCIUM: CPT

## 2020-08-09 PROCEDURE — 84132 ASSAY OF SERUM POTASSIUM: CPT

## 2020-08-09 PROCEDURE — 99285 EMERGENCY DEPT VISIT HI MDM: CPT | Mod: 25

## 2020-08-09 PROCEDURE — 86850 RBC ANTIBODY SCREEN: CPT

## 2020-08-09 PROCEDURE — 86922 COMPATIBILITY TEST ANTIGLOB: CPT

## 2020-08-09 PROCEDURE — 96365 THER/PROPH/DIAG IV INF INIT: CPT | Mod: XU

## 2020-08-09 PROCEDURE — 85610 PROTHROMBIN TIME: CPT

## 2020-08-09 PROCEDURE — 87040 BLOOD CULTURE FOR BACTERIA: CPT

## 2020-08-09 PROCEDURE — 85014 HEMATOCRIT: CPT

## 2020-08-09 PROCEDURE — 82272 OCCULT BLD FECES 1-3 TESTS: CPT

## 2020-08-09 PROCEDURE — 85730 THROMBOPLASTIN TIME PARTIAL: CPT

## 2020-08-09 PROCEDURE — 96375 TX/PRO/DX INJ NEW DRUG ADDON: CPT

## 2020-08-09 PROCEDURE — 82435 ASSAY OF BLOOD CHLORIDE: CPT

## 2020-08-09 PROCEDURE — 84295 ASSAY OF SERUM SODIUM: CPT

## 2020-08-09 PROCEDURE — 80202 ASSAY OF VANCOMYCIN: CPT

## 2020-08-09 PROCEDURE — 82962 GLUCOSE BLOOD TEST: CPT

## 2020-08-09 PROCEDURE — 86870 RBC ANTIBODY IDENTIFICATION: CPT

## 2020-08-09 PROCEDURE — 74177 CT ABD & PELVIS W/CONTRAST: CPT

## 2020-08-09 PROCEDURE — 86900 BLOOD TYPING SEROLOGIC ABO: CPT

## 2020-08-09 PROCEDURE — 87507 IADNA-DNA/RNA PROBE TQ 12-25: CPT

## 2020-08-09 PROCEDURE — 83605 ASSAY OF LACTIC ACID: CPT

## 2020-08-09 PROCEDURE — 82803 BLOOD GASES ANY COMBINATION: CPT

## 2020-08-09 PROCEDURE — 86880 COOMBS TEST DIRECT: CPT

## 2020-08-09 PROCEDURE — 81001 URINALYSIS AUTO W/SCOPE: CPT

## 2020-08-09 RX ORDER — ACETAMINOPHEN 500 MG
3 TABLET ORAL
Qty: 0 | Refills: 0 | DISCHARGE
Start: 2020-08-09

## 2020-08-09 RX ORDER — ASPIRIN/CALCIUM CARB/MAGNESIUM 324 MG
0 TABLET ORAL
Qty: 0 | Refills: 0 | DISCHARGE

## 2020-08-09 RX ORDER — METHOTREXATE 2.5 MG/1
0 TABLET ORAL
Qty: 0 | Refills: 0 | DISCHARGE

## 2020-08-09 RX ORDER — METFORMIN HYDROCHLORIDE 850 MG/1
1 TABLET ORAL
Qty: 0 | Refills: 0 | DISCHARGE

## 2020-08-09 RX ORDER — FOLIC ACID 0.8 MG
1 TABLET ORAL
Qty: 0 | Refills: 0 | DISCHARGE

## 2020-08-09 RX ORDER — ADALIMUMAB 40MG/0.8ML
40 KIT SUBCUTANEOUS
Qty: 0 | Refills: 0 | DISCHARGE

## 2020-08-09 RX ADMIN — HEPARIN SODIUM 5000 UNIT(S): 5000 INJECTION INTRAVENOUS; SUBCUTANEOUS at 13:30

## 2020-08-09 RX ADMIN — HEPARIN SODIUM 5000 UNIT(S): 5000 INJECTION INTRAVENOUS; SUBCUTANEOUS at 05:46

## 2020-08-09 RX ADMIN — Medication 1 TABLET(S): at 13:30

## 2020-08-09 RX ADMIN — AMLODIPINE BESYLATE 5 MILLIGRAM(S): 2.5 TABLET ORAL at 05:47

## 2020-08-09 RX ADMIN — PANTOPRAZOLE SODIUM 40 MILLIGRAM(S): 20 TABLET, DELAYED RELEASE ORAL at 05:47

## 2020-08-09 NOTE — PROGRESS NOTE ADULT - SUBJECTIVE AND OBJECTIVE BOX
[facility] Progress Note    POD#12   HD#5    Patient seen and examined at bedside.  No acute events overnight. No acute complaints.  Pain well controlled.  Patient is ambulating and tolerating regular diet. Passing flatus, reports formed BM, denies diarrhea. Voiding spontaneously.  Denies CP, SOB, N/V, fevers, and chills.    Vital Signs Last 24 Hours  T(C): 37.2 (08-09-20 @ 05:25), Max: 37.6 (08-08-20 @ 09:13)  HR: 75 (08-09-20 @ 05:25) (75 - 95)  BP: 129/80 (08-09-20 @ 05:25) (98/63 - 129/80)  RR: 18 (08-09-20 @ 05:25) (17 - 18)  SpO2: 96% (08-09-20 @ 05:25) (96% - 98%)    I&O's Summary    07 Aug 2020 07:01  -  08 Aug 2020 07:00  --------------------------------------------------------  IN: 1240 mL / OUT: 0 mL / NET: 1240 mL    08 Aug 2020 07:01  -  09 Aug 2020 06:45  --------------------------------------------------------  IN: 970 mL / OUT: 0 mL / NET: 970 mL        Physical Exam:  General: NAD  CV: RRR  Lungs: CTAB  Abdomen:  Soft, non- tender to palpation, normoactive bowel sounds, +grey ge/ila sign  Incision: 3 LSC site w/ surrounding ecchymosis, non-tender, skin at incisions c/d/i w/o erythema/edema/warmth  Ext: No pain or swelling       Labs:                        9.3    6.41  )-----------( 372      ( 08 Aug 2020 06:52 )             29.4   baso x      eos x      imm gran x      lymph x      mono x      poly x                            9.3    6.55  )-----------( 338      ( 07 Aug 2020 06:03 )             29.0   baso 0.5    eos 1.5    imm gran 1.5    lymph 23.5   mono 9.9    poly 63.1                         9.8    8.98  )-----------( 347      ( 06 Aug 2020 12:38 )             30.5   baso x      eos x      imm gran x      lymph x      mono x      poly x                            9.5    7.94  )-----------( 302      ( 06 Aug 2020 07:18 )             29.4   baso 0.4    eos 1.4    imm gran 0.9    lymph 15.6   mono 10.5   poly 71.2       MEDICATIONS  (STANDING):  amLODIPine   Tablet 5 milliGRAM(s) Oral daily  atorvastatin 10 milliGRAM(s) Oral at bedtime  heparin   Injectable 5000 Unit(s) SubCutaneous every 8 hours  lactobacillus acidophilus 1 Tablet(s) Oral daily  pantoprazole    Tablet 40 milliGRAM(s) Oral before breakfast    MEDICATIONS  (PRN):  acetaminophen   Tablet .. 975 milliGRAM(s) Oral every 6 hours PRN Mild Pain (1 - 3)  glucagon  Injectable 1 milliGRAM(s) IntraMuscular once PRN Glucose LESS THAN 70 milligrams/deciliter [facility] Progress Note    POD#12   HD#5    Patient seen and examined at bedside.  No acute events overnight. No acute complaints.  Pain well controlled.  Patient is ambulating and tolerating regular diet. Passing flatus, reports formed BM, denies diarrhea. Voiding spontaneously.  Denies CP, SOB, N/V, fevers, and chills.    Vital Signs Last 24 Hours  T(C): 37.2 (08-09-20 @ 05:25), Max: 37.6 (08-08-20 @ 09:13)  HR: 75 (08-09-20 @ 05:25) (75 - 95)  BP: 129/80 (08-09-20 @ 05:25) (98/63 - 129/80)  RR: 18 (08-09-20 @ 05:25) (17 - 18)  SpO2: 96% (08-09-20 @ 05:25) (96% - 98%)    I&O's Summary    07 Aug 2020 07:01  -  08 Aug 2020 07:00  --------------------------------------------------------  IN: 1240 mL / OUT: 0 mL / NET: 1240 mL    08 Aug 2020 07:01  -  09 Aug 2020 06:45  --------------------------------------------------------  IN: 970 mL / OUT: 0 mL / NET: 970 mL        Physical Exam:  General: NAD  CV: RRR  Lungs: CTAB  Abdomen:  Soft, non- tender to palpation, normoactive bowel sounds, +grey ge/ila sign  Incision: 3 LSC site w/ surrounding ecchymosis, non-tender, skin at incisions c/d/i w/o erythema/edema/warmth  Ext: No pain or swelling       Labs:                        9.7    5.76  )-----------( 355      ( 08-09 @ 07:19 )             31.0                9.3    6.41  )-----------( 372      ( 08-08 @ 06:52 )             29.4                9.3    6.55  )-----------( 338      ( 08-07 @ 06:03 )             29.0                9.8    8.98  )-----------( 347      ( 08-06 @ 12:38 )             30.5         MEDICATIONS  (STANDING):  amLODIPine   Tablet 5 milliGRAM(s) Oral daily  atorvastatin 10 milliGRAM(s) Oral at bedtime  heparin   Injectable 5000 Unit(s) SubCutaneous every 8 hours  lactobacillus acidophilus 1 Tablet(s) Oral daily  pantoprazole    Tablet 40 milliGRAM(s) Oral before breakfast    MEDICATIONS  (PRN):  acetaminophen   Tablet .. 975 milliGRAM(s) Oral every 6 hours PRN Mild Pain (1 - 3)  glucagon  Injectable 1 milliGRAM(s) IntraMuscular once PRN Glucose LESS THAN 70 milligrams/deciliter

## 2020-08-09 NOTE — DISCHARGE NOTE NURSING/CASE MANAGEMENT/SOCIAL WORK - PATIENT PORTAL LINK FT
You can access the FollowMyHealth Patient Portal offered by [facility] by registering at the following website: [email or web address]. By joining [employer]’s FollowMyHealth portal, you will also be able to view your health information using other applications (apps) compatible with our system.

## 2020-08-09 NOTE — PROGRESS NOTE ADULT - ATTENDING COMMENTS
ATTG note    HD#5 after readmission for fevers/Diarrhea on POD#8 s/p LSC BSO for adnexal masses at [facility].  Pt was admitted to [facility] on POD#1 for tachycardia/anemia/possible shock with a hemoperitoneum.  ON this admission admitted for concern for infected hematoma.  Pt counseled by IR and Gen surg and no surgical interventions warranted.  Pt started on IV abx Vanc/Zosyn that was dc one day ago since pt was afebrile.    Pt seen at the bedside.  No complaints today, normal bowel movements. Tolerating reg diet. No n/v.    VSS, afebrile for 48 hours off of abx now. last elevated temp - 100.3 (8/6 10:22p)  GEn-NAD, AAO  ABd-soft, nd, ecchymossis on lower abd, palpable indurated area periumbilical mildly tender    WBC wnl, H&H stable    HD#5 after readmission for possible infected hematomas s/p LSC BSO at [facility] POD#12 now.  Surgically afebrile since admission.  Most recent highest temp on 8/6 @ 1022p - s/p IV abx Vanc/Zosyn now for 48 hours, still afebrile.  Resolved Leukocytosis.  H/H stable.  Improvement in GI sxs.  Overall doing better clinically.  Cxs Neg to date. No concern for infection at this time  -d/c today with outpatient follow up with patient's gyn    Kevin Wilkins MD

## 2020-08-09 NOTE — PROGRESS NOTE ADULT - REASON FOR ADMISSION
post operative complication
postoperative complication
fever and diarrhea in the setting of recent surgery
diarrhea, hemoperitoneum

## 2020-08-09 NOTE — PROGRESS NOTE ADULT - ASSESSMENT
65y POD#11 s/p b/l oophorectomy requiring admission POD#1-POD#3 for hemoperitoneum/hemorrhagic shock requiring 3uPRBC readmitted again on POD#8 with diarrhea, subjective fevers, found to have small collections at umbilical and LLQ port sites concerning for possibly infected hematomas.  Patient afebrile since admission with improvement in diarrhea .  No evidence of infection/drainage at this time. Repeat CT A/P demonstrates small complex free fluid in the pelvic, decrease in hematoma size.  Subcutaneous heterogeneity noted.  No e/o active bleeding and H/H stable. Leukocytosis now resolved.  Pt's ssx likely result of continued absorption of large hemoperitoneum.  No focal signs of infection. Pt now off of abx >24 hrs without fevers or recurrence of s/sx. Will continue to monitor inpt. Consider evaluation for discharge this afternoon.

## 2022-03-26 ENCOUNTER — EMERGENCY (EMERGENCY)
Facility: HOSPITAL | Age: 67
LOS: 1 days | Discharge: HOME | End: 2022-03-26
Attending: EMERGENCY MEDICINE
Payer: COMMERCIAL

## 2022-03-26 VITALS
HEART RATE: 103 BPM | WEIGHT: 199.96 LBS | SYSTOLIC BLOOD PRESSURE: 110 MMHG | HEIGHT: 66 IN | TEMPERATURE: 100 F | DIASTOLIC BLOOD PRESSURE: 77 MMHG | OXYGEN SATURATION: 95 % | RESPIRATION RATE: 19 BRPM

## 2022-03-26 VITALS
HEART RATE: 78 BPM | TEMPERATURE: 98 F | OXYGEN SATURATION: 99 % | RESPIRATION RATE: 18 BRPM | DIASTOLIC BLOOD PRESSURE: 68 MMHG | SYSTOLIC BLOOD PRESSURE: 109 MMHG

## 2022-03-26 DIAGNOSIS — Z90.722 ACQUIRED ABSENCE OF OVARIES, BILATERAL: Chronic | ICD-10-CM

## 2022-03-26 LAB
ALBUMIN SERPL ELPH-MCNC: 4 G/DL — SIGNIFICANT CHANGE UP (ref 3.3–5)
ALP SERPL-CCNC: 64 U/L — SIGNIFICANT CHANGE UP (ref 40–120)
ALT FLD-CCNC: 11 U/L — SIGNIFICANT CHANGE UP (ref 10–45)
ANION GAP SERPL CALC-SCNC: 14 MMOL/L — SIGNIFICANT CHANGE UP (ref 5–17)
APPEARANCE UR: ABNORMAL
AST SERPL-CCNC: 13 U/L — SIGNIFICANT CHANGE UP (ref 10–40)
BACTERIA # UR AUTO: ABNORMAL
BASOPHILS # BLD AUTO: 0.02 K/UL — SIGNIFICANT CHANGE UP (ref 0–0.2)
BASOPHILS NFR BLD AUTO: 0.2 % — SIGNIFICANT CHANGE UP (ref 0–2)
BILIRUB SERPL-MCNC: 0.6 MG/DL — SIGNIFICANT CHANGE UP (ref 0.2–1.2)
BILIRUB UR-MCNC: NEGATIVE — SIGNIFICANT CHANGE UP
BUN SERPL-MCNC: 10 MG/DL — SIGNIFICANT CHANGE UP (ref 7–23)
CALCIUM SERPL-MCNC: 9.8 MG/DL — SIGNIFICANT CHANGE UP (ref 8.4–10.5)
CHLORIDE SERPL-SCNC: 102 MMOL/L — SIGNIFICANT CHANGE UP (ref 96–108)
CO2 SERPL-SCNC: 26 MMOL/L — SIGNIFICANT CHANGE UP (ref 22–31)
COLOR SPEC: YELLOW — SIGNIFICANT CHANGE UP
CREAT SERPL-MCNC: 0.82 MG/DL — SIGNIFICANT CHANGE UP (ref 0.5–1.3)
DIFF PNL FLD: ABNORMAL
EGFR: 78 ML/MIN/1.73M2 — SIGNIFICANT CHANGE UP
EOSINOPHIL # BLD AUTO: 0.01 K/UL — SIGNIFICANT CHANGE UP (ref 0–0.5)
EOSINOPHIL NFR BLD AUTO: 0.1 % — SIGNIFICANT CHANGE UP (ref 0–6)
EPI CELLS # UR: 2 /HPF — SIGNIFICANT CHANGE UP
GLUCOSE SERPL-MCNC: 123 MG/DL — HIGH (ref 70–99)
GLUCOSE UR QL: NEGATIVE — SIGNIFICANT CHANGE UP
HCT VFR BLD CALC: 41.2 % — SIGNIFICANT CHANGE UP (ref 34.5–45)
HGB BLD-MCNC: 13.6 G/DL — SIGNIFICANT CHANGE UP (ref 11.5–15.5)
HYALINE CASTS # UR AUTO: 5 /LPF — HIGH (ref 0–2)
IMM GRANULOCYTES NFR BLD AUTO: 0.2 % — SIGNIFICANT CHANGE UP (ref 0–1.5)
KETONES UR-MCNC: NEGATIVE — SIGNIFICANT CHANGE UP
LEUKOCYTE ESTERASE UR-ACNC: ABNORMAL
LYMPHOCYTES # BLD AUTO: 1.14 K/UL — SIGNIFICANT CHANGE UP (ref 1–3.3)
LYMPHOCYTES # BLD AUTO: 10.7 % — LOW (ref 13–44)
MCHC RBC-ENTMCNC: 31.6 PG — SIGNIFICANT CHANGE UP (ref 27–34)
MCHC RBC-ENTMCNC: 33 GM/DL — SIGNIFICANT CHANGE UP (ref 32–36)
MCV RBC AUTO: 95.8 FL — SIGNIFICANT CHANGE UP (ref 80–100)
MONOCYTES # BLD AUTO: 0.96 K/UL — HIGH (ref 0–0.9)
MONOCYTES NFR BLD AUTO: 9 % — SIGNIFICANT CHANGE UP (ref 2–14)
NEUTROPHILS # BLD AUTO: 8.47 K/UL — HIGH (ref 1.8–7.4)
NEUTROPHILS NFR BLD AUTO: 79.8 % — HIGH (ref 43–77)
NITRITE UR-MCNC: POSITIVE
NRBC # BLD: 0 /100 WBCS — SIGNIFICANT CHANGE UP (ref 0–0)
PH UR: 6 — SIGNIFICANT CHANGE UP (ref 5–8)
PLATELET # BLD AUTO: 195 K/UL — SIGNIFICANT CHANGE UP (ref 150–400)
POTASSIUM SERPL-MCNC: 4.2 MMOL/L — SIGNIFICANT CHANGE UP (ref 3.5–5.3)
POTASSIUM SERPL-SCNC: 4.2 MMOL/L — SIGNIFICANT CHANGE UP (ref 3.5–5.3)
PROT SERPL-MCNC: 7.2 G/DL — SIGNIFICANT CHANGE UP (ref 6–8.3)
PROT UR-MCNC: ABNORMAL
RBC # BLD: 4.3 M/UL — SIGNIFICANT CHANGE UP (ref 3.8–5.2)
RBC # FLD: 13.9 % — SIGNIFICANT CHANGE UP (ref 10.3–14.5)
RBC CASTS # UR COMP ASSIST: 4 /HPF — SIGNIFICANT CHANGE UP (ref 0–4)
SODIUM SERPL-SCNC: 142 MMOL/L — SIGNIFICANT CHANGE UP (ref 135–145)
SP GR SPEC: 1.01 — SIGNIFICANT CHANGE UP (ref 1.01–1.02)
UROBILINOGEN FLD QL: NEGATIVE — SIGNIFICANT CHANGE UP
WBC # BLD: 10.62 K/UL — HIGH (ref 3.8–10.5)
WBC # FLD AUTO: 10.62 K/UL — HIGH (ref 3.8–10.5)
WBC UR QL: 454 /HPF — HIGH (ref 0–5)

## 2022-03-26 PROCEDURE — 96374 THER/PROPH/DIAG INJ IV PUSH: CPT

## 2022-03-26 PROCEDURE — 0225U NFCT DS DNA&RNA 21 SARSCOV2: CPT

## 2022-03-26 PROCEDURE — 99284 EMERGENCY DEPT VISIT MOD MDM: CPT | Mod: 25

## 2022-03-26 PROCEDURE — 80053 COMPREHEN METABOLIC PANEL: CPT

## 2022-03-26 PROCEDURE — 81001 URINALYSIS AUTO W/SCOPE: CPT

## 2022-03-26 PROCEDURE — 87086 URINE CULTURE/COLONY COUNT: CPT

## 2022-03-26 PROCEDURE — 85025 COMPLETE CBC W/AUTO DIFF WBC: CPT

## 2022-03-26 PROCEDURE — 87186 SC STD MICRODIL/AGAR DIL: CPT

## 2022-03-26 PROCEDURE — 99284 EMERGENCY DEPT VISIT MOD MDM: CPT

## 2022-03-26 RX ORDER — SODIUM CHLORIDE 9 MG/ML
1000 INJECTION INTRAMUSCULAR; INTRAVENOUS; SUBCUTANEOUS ONCE
Refills: 0 | Status: COMPLETED | OUTPATIENT
Start: 2022-03-26 | End: 2022-03-26

## 2022-03-26 RX ORDER — CEFTRIAXONE 500 MG/1
1000 INJECTION, POWDER, FOR SOLUTION INTRAMUSCULAR; INTRAVENOUS ONCE
Refills: 0 | Status: COMPLETED | OUTPATIENT
Start: 2022-03-26 | End: 2022-03-26

## 2022-03-26 RX ORDER — CEPHALEXIN 500 MG
1 CAPSULE ORAL
Qty: 14 | Refills: 0
Start: 2022-03-26 | End: 2022-04-01

## 2022-03-26 RX ORDER — ACETAMINOPHEN 500 MG
650 TABLET ORAL ONCE
Refills: 0 | Status: COMPLETED | OUTPATIENT
Start: 2022-03-26 | End: 2022-03-26

## 2022-03-26 RX ADMIN — SODIUM CHLORIDE 1000 MILLILITER(S): 9 INJECTION INTRAMUSCULAR; INTRAVENOUS; SUBCUTANEOUS at 06:35

## 2022-03-26 RX ADMIN — Medication 650 MILLIGRAM(S): at 08:16

## 2022-03-26 RX ADMIN — CEFTRIAXONE 100 MILLIGRAM(S): 500 INJECTION, POWDER, FOR SOLUTION INTRAMUSCULAR; INTRAVENOUS at 08:17

## 2022-03-26 NOTE — ED PROVIDER NOTE - PATIENT PORTAL LINK FT
You can access the FollowMyHealth Patient Portal offered by [facility] by registering at the following website: [email or web address]. By joining [employer]’s FollowMyHealth portal, you will also be able to view your health information using other applications (apps) compatible with our system.

## 2022-03-26 NOTE — ED PROVIDER NOTE - PROGRESS NOTE DETAILS
Mini Klein MD (PGY2) -  Pt seen & reassessed.  Pt symptomatically improved.  NAD.  We discussed the results of ED w/u w/patient (incl. presumptive Emergency Department dx, associated anticipatory guidance, stressing importance of prompt f/u, return precautions), & gave them a copy of results.  Patient verbalized understanding of ED course & agreed with our f/u recommendations, has decisional making capacity.  Pt st they will f/u w/PMD within the next 3 days; pt agrees to call today or tomorrow for an appointment. Pt agrees to return to the ED if there is any worsening or concerning symptoms.

## 2022-03-26 NOTE — ED PROVIDER NOTE - NSFOLLOWUPINSTRUCTIONS_ED_ALL_ED_FT
You were seen in the emergency department for: fever  Your diagnosis for this visit was: urinary tract infection   From this ED visit you were prescribed: cephalexin     Please take one tablet of Cephalexin TWO times per day for the next 7 days.       Please return to the Emergency Department if you experience any of the following symptoms:   - Shortness of breath or trouble breathing  - Pressure, pain or tightness in the chest  - Face drooping, arm weakness or speech difficulty  - Persistence of severe vomiting  - Head injury or loss of consciousness  - Nonstop bleeding or an open wound    (1) Follow up with your primary care physician within the next 24-48 hours as discussed. In addition, we did not find evidence of a life threatening illness on your testing here today, but listed below are the specialists that will be necessary to see as an outpatient to continue the workup.  Please call the numbers listed below or [phone] to set up the necessary appointments.  (2) Take Tylenol (up to 1000mg or 1 g) up to every 6 hours as needed for pain.   (3) If you had an IV (intravenous) line placed, it was removed. Sometimes, after IV removal, that area can be tender for a few days; if it develops redness and swelling, those could be signs of infection; in which case, return to the Emergency Department for assessment.  (4) Please continue taking all of your home medications as directed.      Urinary Tract Infection, Adult       A urinary tract infection (UTI) is an infection of any part of the urinary tract. The urinary tract includes:  •The kidneys.      •The ureters.      •The bladder.      •The urethra.      These organs make, store, and get rid of pee (urine) in the body.      What are the causes?    This infection is caused by germs (bacteria) in your genital area. These germs grow and cause swelling (inflammation) of your urinary tract.      What increases the risk?    The following factors may make you more likely to develop this condition:  •Using a small, thin tube (catheter) to drain pee.      •Not being able to control when you pee or poop (incontinence).    •Being female. If you are female, these things can increase the risk:•Using these methods to prevent pregnancy:  •A medicine that kills sperm (spermicide).      •A device that blocks sperm (diaphragm).        •Having low levels of a female hormone (estrogen).      •Being pregnant.        You are more likely to develop this condition if:  •You have genes that add to your risk.      •You are sexually active.      •You take antibiotic medicines.     •You have trouble peeing because of:  •A prostate that is bigger than normal, if you are male.      •A blockage in the part of your body that drains pee from the bladder.      •A kidney stone.       •A nerve condition that affects your bladder.      •Not getting enough to drink.       •Not peeing often enough.      •You have other conditions, such as:  •Diabetes.       •A weak disease-fighting system (immune system).      •Sickle cell disease.       •Gout.       •Injury of the spine.          What are the signs or symptoms?    Symptoms of this condition include:  •Needing to pee right away.      •Peeing small amounts often.      •Pain or burning when peeing.      •Blood in the pee.      •Pee that smells bad or not like normal.      •Trouble peeing.      •Pee that is cloudy.      •Fluid coming from the vagina, if you are female.      •Pain in the belly or lower back.      Other symptoms include:  •Vomiting.      •Not feeling hungry.      •Feeling mixed up (confused). This may be the first symptom in older adults.      •Being tired and grouchy (irritable).      •A fever.      •Watery poop (diarrhea).        How is this treated?    •Taking antibiotic medicine.      •Taking other medicines.      •Drinking enough water.      In some cases, you may need to see a specialist.      Follow these instructions at home:     Medicines     •Take over-the-counter and prescription medicines only as told by your doctor.      •If you were prescribed an antibiotic medicine, take it as told by your doctor. Do not stop taking it even if you start to feel better.      General instructions   •Make sure you:  •Pee until your bladder is empty.       •Do not hold pee for a long time.      •Empty your bladder after sex.      •Wipe from front to back after peeing or pooping if you are a female. Use each tissue one time when you wipe.        •Drink enough fluid to keep your pee pale yellow.      •Keep all follow-up visits.        Contact a doctor if:    •You do not get better after 1–2 days.      •Your symptoms go away and then come back.        Get help right away if:    •You have very bad back pain.      •You have very bad pain in your lower belly.      •You have a fever.      •You have chills.      •You feeling like you will vomit or you vomit.        Summary    •A urinary tract infection (UTI) is an infection of any part of the urinary tract.      •This condition is caused by germs in your genital area.      •There are many risk factors for a UTI.      •Treatment includes antibiotic medicines.      •Drink enough fluid to keep your pee pale yellow.      This information is not intended to replace advice given to you by your health care provider. Make sure you discuss any questions you have with your health care provider.

## 2022-03-26 NOTE — ED PROVIDER NOTE - CLINICAL SUMMARY MEDICAL DECISION MAKING FREE TEXT BOX
68 y/o female with pmhx of rheumatoid arthritis, HTN, and GERD presenting with fever; recent dysuria that resolved; otherwise asymptomatic with benign physical exam other than tachycardia. Will check basic labs, UA/UC, fluid bolus and reassess. Took tylenol prior to arrival. Will continue to reassess Never

## 2022-03-26 NOTE — ED PROVIDER NOTE - NSFOLLOWUPCLINICS_GEN_ALL_ED_FT
[facility] - Primary Care  Primary Care  [address] [place] [address]  Phone: [phone]  Fax:

## 2022-03-26 NOTE — ED PROVIDER NOTE - PHYSICAL EXAMINATION
Gen: WDWN, NAD, comfortable appearing, temp 100.3    HEENT: EOMI, no nasal discharge, mucous membranes moist, no oropharyngeal edema/erythema/exudates   CV: Tachycardic with regular rhythm, +S1/S2, no M/R/G, equal b/l radial pulses 2+  Resp: CTAB, no W/R/R, SPO2 >95% on RA, no increased WOB   GI: Abdomen soft non-distended, NTTP, no masses/organomegaly   MSK/Skin: No CVA tenderness, no open wounds, no bruising, no LE edema  Neuro: A&Ox4, moving all 4 extremities spontaneously, gross sensation intact in UE and LE BL  Skin: No rashes   Psych: appropriate mood

## 2022-03-26 NOTE — ED PROVIDER NOTE - NS ED ROS FT
Gen: Denies weight loss  CV: Denies chest pain, palpitations  Skin: Denies rash, erythema, color changes  Resp: Denies SOB, cough  Endo: Denies sensitivity to heat, cold, increased urination  GI: Denies diarrhea, constipation, nausea, vomiting  Msk: Denies neck or  back pain  : Denies dysuria, increased frequency  Neuro: Denies LOC, weakness

## 2022-03-26 NOTE — ED ADULT NURSE NOTE - OBJECTIVE STATEMENT
Pt presents from home for abdominal pain, fever with dysuria x 2 days. Pt denies nausea vomiting, cp, sob Pt presents from home for fever, temp measured 103F, with dysuria x  few days. Pt currently denies nausea vomiting, cp, sob abdominal pain, states dysuria has now resolved. Pt states took Tylenol prior to arrival.

## 2022-03-26 NOTE — ED ADULT NURSE REASSESSMENT NOTE - NS ED NURSE REASSESS COMMENT FT1
Pt introduced to oncoming RN and updated on plan of care. A&Ox4, breathing unlabored, resting comfortably in bed, VSS. Pt c/o headache at this time, requesting medication. Call bell in reach pt educated on use, bed in lowest position.  at bedside.

## 2022-03-26 NOTE — ED PROVIDER NOTE - OBJECTIVE STATEMENT
66 y/o female with pmhx of rheumatoid arthritis, HTN, and GERD presenting with fever. Tmax of 103, oral tonight, took tylenol 1hour PTA. On Tuesday-Wednesday had burning with urination; went to OBGYN who did not prescribe abx. Dysuria self resolved. [place] warm tonight and check temp which was noted to be 103 with no chills, n/v/d, cough, rashes, congestion, sore throat, abdominal pain, or joint pain. Denies any other symptoms than fever on presentation. COVID vaccinated.

## 2022-03-26 NOTE — ED PROVIDER NOTE - ATTENDING CONTRIBUTION TO CARE
Attending MD Ambrosio: I personally have seen and examined this patient.  Resident note reviewed and agree on plan of care and except where noted.  See below for details.     Seen in [place] [facility]    67F with PMH/PSH including RA, HTN, GERD presents to the ED with fever and recent dysuria.  Reports 3-4 days ago had dysuria and was evalauted by Ob/Gyn but denies Rx'ed abx.  Reports dysuria resolved but reports noted to have subjective fever today, when checked had T 103.  Denies chills. Denies abdominal pain, nausea, vomiting, diarrhea, bloody or black stools. Denies chest pain, shortness of breath, cough, URI symptoms, sick contacts, recent travel.  Denies dysuria, hematuria, frequency, urgency today.  Denies rash.  A ten (10) point review of systems was negative other than as stated in the HPI or elsewhere in the chart.    Exam:   General: NAD  HENT: head NCAT, airway patent with moist mucous membranes  Eyes: PERRL  Lungs: lungs CTAB with good inspiratory effort, no wheezing, no rhonchi, no rales  Cardiac: +S1S2, no m/r/g  GI: abdomen soft with +BS, NT, ND  : no CVAT  MSK: FROM at neck, no tenderness to midline palpation, no stepoffs along length of spine, no calf tenderness, swelling, erythema or warmth  Neuro: moving all extremities spontaneously, sensory grossly intact, no gross neuro deficits  Psych: normal mood and affect     A/P: 67F with fever of unclear etiology, possibly urinary given recent symptoms, will obtain UA, UrCx to eval for UTI/Pyelo, will obtain CXR to eval for occult PNA, RVP swab for possible URI, will obtain labs for metabolic derangement, signs of infection, antipyretic as needed, IVFs, reassess

## 2022-03-27 ENCOUNTER — INPATIENT (INPATIENT)
Facility: HOSPITAL | Age: 67
LOS: 4 days | Discharge: HOME | DRG: 690 | End: 2022-04-01
Attending: INTERNAL MEDICINE | Admitting: INTERNAL MEDICINE
Payer: COMMERCIAL

## 2022-03-27 VITALS
RESPIRATION RATE: 20 BRPM | HEIGHT: 66 IN | SYSTOLIC BLOOD PRESSURE: 119 MMHG | OXYGEN SATURATION: 99 % | HEART RATE: 106 BPM | TEMPERATURE: 98 F | WEIGHT: 203.93 LBS | DIASTOLIC BLOOD PRESSURE: 68 MMHG

## 2022-03-27 DIAGNOSIS — Z90.722 ACQUIRED ABSENCE OF OVARIES, BILATERAL: Chronic | ICD-10-CM

## 2022-03-27 LAB
ALBUMIN SERPL ELPH-MCNC: 3.8 G/DL — SIGNIFICANT CHANGE UP (ref 3.3–5)
ALP SERPL-CCNC: 62 U/L — SIGNIFICANT CHANGE UP (ref 40–120)
ALT FLD-CCNC: 10 U/L — SIGNIFICANT CHANGE UP (ref 10–45)
ANION GAP SERPL CALC-SCNC: 11 MMOL/L — SIGNIFICANT CHANGE UP (ref 5–17)
APPEARANCE UR: CLEAR — SIGNIFICANT CHANGE UP
AST SERPL-CCNC: 11 U/L — SIGNIFICANT CHANGE UP (ref 10–40)
BACTERIA # UR AUTO: NEGATIVE — SIGNIFICANT CHANGE UP
BASE EXCESS BLDV CALC-SCNC: 5 MMOL/L — HIGH (ref -2–2)
BASOPHILS # BLD AUTO: 0.01 K/UL — SIGNIFICANT CHANGE UP (ref 0–0.2)
BASOPHILS NFR BLD AUTO: 0.1 % — SIGNIFICANT CHANGE UP (ref 0–2)
BILIRUB SERPL-MCNC: 0.4 MG/DL — SIGNIFICANT CHANGE UP (ref 0.2–1.2)
BILIRUB UR-MCNC: NEGATIVE — SIGNIFICANT CHANGE UP
BUN SERPL-MCNC: 10 MG/DL — SIGNIFICANT CHANGE UP (ref 7–23)
CA-I SERPL-SCNC: 1.18 MMOL/L — SIGNIFICANT CHANGE UP (ref 1.15–1.33)
CALCIUM SERPL-MCNC: 9.1 MG/DL — SIGNIFICANT CHANGE UP (ref 8.4–10.5)
CHLORIDE BLDV-SCNC: 101 MMOL/L — SIGNIFICANT CHANGE UP (ref 96–108)
CHLORIDE SERPL-SCNC: 102 MMOL/L — SIGNIFICANT CHANGE UP (ref 96–108)
CO2 BLDV-SCNC: 32 MMOL/L — HIGH (ref 22–26)
CO2 SERPL-SCNC: 27 MMOL/L — SIGNIFICANT CHANGE UP (ref 22–31)
COLOR SPEC: COLORLESS — SIGNIFICANT CHANGE UP
CREAT SERPL-MCNC: 0.76 MG/DL — SIGNIFICANT CHANGE UP (ref 0.5–1.3)
DIFF PNL FLD: NEGATIVE — SIGNIFICANT CHANGE UP
EGFR: 86 ML/MIN/1.73M2 — SIGNIFICANT CHANGE UP
EOSINOPHIL # BLD AUTO: 0 K/UL — SIGNIFICANT CHANGE UP (ref 0–0.5)
EOSINOPHIL NFR BLD AUTO: 0 % — SIGNIFICANT CHANGE UP (ref 0–6)
EPI CELLS # UR: 1 /HPF — SIGNIFICANT CHANGE UP
GAS PNL BLDV: 135 MMOL/L — LOW (ref 136–145)
GAS PNL BLDV: SIGNIFICANT CHANGE UP
GAS PNL BLDV: SIGNIFICANT CHANGE UP
GLUCOSE BLDV-MCNC: 97 MG/DL — SIGNIFICANT CHANGE UP (ref 70–99)
GLUCOSE SERPL-MCNC: 101 MG/DL — HIGH (ref 70–99)
GLUCOSE UR QL: NEGATIVE — SIGNIFICANT CHANGE UP
HCO3 BLDV-SCNC: 31 MMOL/L — HIGH (ref 22–29)
HCT VFR BLD CALC: 40.1 % — SIGNIFICANT CHANGE UP (ref 34.5–45)
HCT VFR BLDA CALC: 41 % — SIGNIFICANT CHANGE UP (ref 34.5–46.5)
HGB BLD CALC-MCNC: 13.6 G/DL — SIGNIFICANT CHANGE UP (ref 11.7–16.1)
HGB BLD-MCNC: 13.1 G/DL — SIGNIFICANT CHANGE UP (ref 11.5–15.5)
HYALINE CASTS # UR AUTO: 0 /LPF — SIGNIFICANT CHANGE UP (ref 0–2)
IMM GRANULOCYTES NFR BLD AUTO: 0.5 % — SIGNIFICANT CHANGE UP (ref 0–1.5)
KETONES UR-MCNC: NEGATIVE — SIGNIFICANT CHANGE UP
LACTATE BLDV-MCNC: 1.3 MMOL/L — SIGNIFICANT CHANGE UP (ref 0.7–2)
LEUKOCYTE ESTERASE UR-ACNC: NEGATIVE — SIGNIFICANT CHANGE UP
LYMPHOCYTES # BLD AUTO: 1.21 K/UL — SIGNIFICANT CHANGE UP (ref 1–3.3)
LYMPHOCYTES # BLD AUTO: 12.3 % — LOW (ref 13–44)
MCHC RBC-ENTMCNC: 31.3 PG — SIGNIFICANT CHANGE UP (ref 27–34)
MCHC RBC-ENTMCNC: 32.7 GM/DL — SIGNIFICANT CHANGE UP (ref 32–36)
MCV RBC AUTO: 95.7 FL — SIGNIFICANT CHANGE UP (ref 80–100)
MONOCYTES # BLD AUTO: 0.86 K/UL — SIGNIFICANT CHANGE UP (ref 0–0.9)
MONOCYTES NFR BLD AUTO: 8.8 % — SIGNIFICANT CHANGE UP (ref 2–14)
NEUTROPHILS # BLD AUTO: 7.67 K/UL — HIGH (ref 1.8–7.4)
NEUTROPHILS NFR BLD AUTO: 78.3 % — HIGH (ref 43–77)
NITRITE UR-MCNC: NEGATIVE — SIGNIFICANT CHANGE UP
NRBC # BLD: 0 /100 WBCS — SIGNIFICANT CHANGE UP (ref 0–0)
PCO2 BLDV: 50 MMHG — HIGH (ref 39–42)
PH BLDV: 7.4 — SIGNIFICANT CHANGE UP (ref 7.32–7.43)
PH UR: 6.5 — SIGNIFICANT CHANGE UP (ref 5–8)
PLATELET # BLD AUTO: 186 K/UL — SIGNIFICANT CHANGE UP (ref 150–400)
PO2 BLDV: 26 MMHG — SIGNIFICANT CHANGE UP (ref 25–45)
POTASSIUM BLDV-SCNC: 3.4 MMOL/L — LOW (ref 3.5–5.1)
POTASSIUM SERPL-MCNC: 3.6 MMOL/L — SIGNIFICANT CHANGE UP (ref 3.5–5.3)
POTASSIUM SERPL-SCNC: 3.6 MMOL/L — SIGNIFICANT CHANGE UP (ref 3.5–5.3)
PROT SERPL-MCNC: 7.1 G/DL — SIGNIFICANT CHANGE UP (ref 6–8.3)
PROT UR-MCNC: NEGATIVE — SIGNIFICANT CHANGE UP
RBC # BLD: 4.19 M/UL — SIGNIFICANT CHANGE UP (ref 3.8–5.2)
RBC # FLD: 13.8 % — SIGNIFICANT CHANGE UP (ref 10.3–14.5)
RBC CASTS # UR COMP ASSIST: 0 /HPF — SIGNIFICANT CHANGE UP (ref 0–4)
SAO2 % BLDV: 37.8 % — LOW (ref 67–88)
SARS-COV-2 RNA SPEC QL NAA+PROBE: SIGNIFICANT CHANGE UP
SODIUM SERPL-SCNC: 140 MMOL/L — SIGNIFICANT CHANGE UP (ref 135–145)
SP GR SPEC: 1.01 — LOW (ref 1.01–1.02)
UROBILINOGEN FLD QL: NEGATIVE — SIGNIFICANT CHANGE UP
WBC # BLD: 9.8 K/UL — SIGNIFICANT CHANGE UP (ref 3.8–10.5)
WBC # FLD AUTO: 9.8 K/UL — SIGNIFICANT CHANGE UP (ref 3.8–10.5)
WBC UR QL: 1 /HPF — SIGNIFICANT CHANGE UP (ref 0–5)

## 2022-03-27 PROCEDURE — 74177 CT ABD & PELVIS W/CONTRAST: CPT | Mod: 26,MA

## 2022-03-27 PROCEDURE — 99218: CPT

## 2022-03-27 RX ORDER — CEFTRIAXONE 500 MG/1
1000 INJECTION, POWDER, FOR SOLUTION INTRAMUSCULAR; INTRAVENOUS EVERY 24 HOURS
Refills: 0 | Status: DISCONTINUED | OUTPATIENT
Start: 2022-03-27 | End: 2022-03-27

## 2022-03-27 RX ORDER — ACETAMINOPHEN 500 MG
975 TABLET ORAL EVERY 6 HOURS
Refills: 0 | Status: DISCONTINUED | OUTPATIENT
Start: 2022-03-27 | End: 2022-04-01

## 2022-03-27 RX ORDER — KETOROLAC TROMETHAMINE 30 MG/ML
15 SYRINGE (ML) INJECTION ONCE
Refills: 0 | Status: DISCONTINUED | OUTPATIENT
Start: 2022-03-27 | End: 2022-03-27

## 2022-03-27 RX ORDER — SODIUM CHLORIDE 9 MG/ML
1000 INJECTION INTRAMUSCULAR; INTRAVENOUS; SUBCUTANEOUS ONCE
Refills: 0 | Status: COMPLETED | OUTPATIENT
Start: 2022-03-27 | End: 2022-03-27

## 2022-03-27 RX ORDER — AMLODIPINE BESYLATE 2.5 MG/1
5 TABLET ORAL DAILY
Refills: 0 | Status: DISCONTINUED | OUTPATIENT
Start: 2022-03-27 | End: 2022-03-28

## 2022-03-27 RX ORDER — ASPIRIN/CALCIUM CARB/MAGNESIUM 324 MG
81 TABLET ORAL DAILY
Refills: 0 | Status: DISCONTINUED | OUTPATIENT
Start: 2022-03-27 | End: 2022-04-01

## 2022-03-27 RX ORDER — SODIUM CHLORIDE 9 MG/ML
1000 INJECTION INTRAMUSCULAR; INTRAVENOUS; SUBCUTANEOUS
Refills: 0 | Status: DISCONTINUED | OUTPATIENT
Start: 2022-03-27 | End: 2022-03-28

## 2022-03-27 RX ORDER — PHENAZOPYRIDINE HCL 100 MG
100 TABLET ORAL ONCE
Refills: 0 | Status: COMPLETED | OUTPATIENT
Start: 2022-03-27 | End: 2022-03-27

## 2022-03-27 RX ORDER — CEFTRIAXONE 500 MG/1
1000 INJECTION, POWDER, FOR SOLUTION INTRAMUSCULAR; INTRAVENOUS ONCE
Refills: 0 | Status: COMPLETED | OUTPATIENT
Start: 2022-03-27 | End: 2022-03-27

## 2022-03-27 RX ORDER — CEFTRIAXONE 500 MG/1
1000 INJECTION, POWDER, FOR SOLUTION INTRAMUSCULAR; INTRAVENOUS EVERY 12 HOURS
Refills: 0 | Status: DISCONTINUED | OUTPATIENT
Start: 2022-03-27 | End: 2022-03-28

## 2022-03-27 RX ORDER — IBUPROFEN 200 MG
600 TABLET ORAL EVERY 6 HOURS
Refills: 0 | Status: DISCONTINUED | OUTPATIENT
Start: 2022-03-27 | End: 2022-04-01

## 2022-03-27 RX ORDER — ACETAMINOPHEN 500 MG
975 TABLET ORAL ONCE
Refills: 0 | Status: COMPLETED | OUTPATIENT
Start: 2022-03-27 | End: 2022-03-27

## 2022-03-27 RX ADMIN — SODIUM CHLORIDE 150 MILLILITER(S): 9 INJECTION INTRAMUSCULAR; INTRAVENOUS; SUBCUTANEOUS at 20:57

## 2022-03-27 RX ADMIN — Medication 600 MILLIGRAM(S): at 20:58

## 2022-03-27 RX ADMIN — Medication 15 MILLIGRAM(S): at 09:28

## 2022-03-27 RX ADMIN — Medication 15 MILLIGRAM(S): at 10:00

## 2022-03-27 RX ADMIN — CEFTRIAXONE 100 MILLIGRAM(S): 500 INJECTION, POWDER, FOR SOLUTION INTRAMUSCULAR; INTRAVENOUS at 21:13

## 2022-03-27 RX ADMIN — SODIUM CHLORIDE 1000 MILLILITER(S): 9 INJECTION INTRAMUSCULAR; INTRAVENOUS; SUBCUTANEOUS at 14:06

## 2022-03-27 RX ADMIN — SODIUM CHLORIDE 150 MILLILITER(S): 9 INJECTION INTRAMUSCULAR; INTRAVENOUS; SUBCUTANEOUS at 14:06

## 2022-03-27 RX ADMIN — CEFTRIAXONE 1000 MILLIGRAM(S): 500 INJECTION, POWDER, FOR SOLUTION INTRAMUSCULAR; INTRAVENOUS at 10:00

## 2022-03-27 RX ADMIN — Medication 975 MILLIGRAM(S): at 14:05

## 2022-03-27 RX ADMIN — Medication 975 MILLIGRAM(S): at 14:35

## 2022-03-27 RX ADMIN — Medication 100 MILLIGRAM(S): at 09:28

## 2022-03-27 RX ADMIN — CEFTRIAXONE 100 MILLIGRAM(S): 500 INJECTION, POWDER, FOR SOLUTION INTRAMUSCULAR; INTRAVENOUS at 09:28

## 2022-03-27 NOTE — ED PROVIDER NOTE - CLINICAL SUMMARY MEDICAL DECISION MAKING FREE TEXT BOX
66yo F PMH HTN, HLD, RA on Humira, bl ovarian cystectomy presents with abd pain, fever tmax 39.4 ongoing for the past 4 days. Suprapubic TTP. Differential includes urinary tract infection w failed outpatient abx vs pyelonephritis vs appendicitis vs diverticulitis vs other intrabd pathology. Less likely pelvic pathology given bl cystectomies. Plan to obtain labs, urine, give abx, obtain imaging of abd, TBA.

## 2022-03-27 NOTE — ED CDU PROVIDER DISPOSITION NOTE - CLINICAL COURSE
68 y/o female htn, hld dm, RA on humira presenting for second visit for urinary sx, pelvic discomfort and high fevers. patient seen outpatient by OB 5 days ago, advised no uti and given diflucan, seen yesterday with positive UA and sent home with keflex. patient returning for high fevers at home and discomfort. in ED patient with fever up to 103, and CT showing right sided pyelonephritis and bilateral pyelitis. placed in CDU for hydration, antipyretics and IV abx. Overnight__________

## 2022-03-27 NOTE — ED PROVIDER NOTE - OBJECTIVE STATEMENT
66yo F PMH HTN, HLD, RA on Humira, bl ovarian cystectomy presents with abd pain, fever tmax 39.4 ongoing for the past 4 days. Went to OB at onset of sxs, given fluconazole at that time without relief. Came to the ED yesterday with the same symptoms. Dx with UTI and dc on Keflex. Pt states symptoms have not improved despite abx and tylenol. States pain currently 5/10 intensity. Last took Tylenol 7am this morning. No N/V/D/C/NS.

## 2022-03-27 NOTE — ED CDU PROVIDER DISPOSITION NOTE - NSFOLLOWUPINSTRUCTIONS_ED_ALL_ED_FT
stay hydrated  Follow up with your Primary Care Physician within the next 2-3 days  Bring a copy of your test results with you to your appointment  Continue your current medication regimen  Return to the Emergency Room if you experience new or worsening symptoms  Take Tylenol 650mg every 6 hrs as needed for fever/pain.  Take Motrin 400-600mg every 6 hrs as needed for fever/ pain. Take with food       Pyelonephritis    Pyelonephritis is a kidney infection. In most cases, the infection clears up with treatment and does not cause further problems. More severe infections or chronic infections can sometimes spread to the bloodstream or lead to other problems with the kidneys. Symptoms include frequent or painful urination, abdominal pain, back pain, flank pain, fever/chills, nausea, or vomiting. If you were prescribed an antibiotic medicine, take it as told by your health care provider. Do not stop taking the antibiotic even if you start to feel better.    SEEK IMMEDIATE MEDICAL CARE IF YOU HAVE ANY OF THE FOLLOWING SYMPTOMS: inability to hold down antibiotics or fluids, worsening pain, dizziness/lightheadedness, or change in mental status.

## 2022-03-27 NOTE — ED ADULT NURSE REASSESSMENT NOTE - NS ED NURSE REASSESS COMMENT FT1
Pt received from DENEEN Navarrete. Pt oriented to CDU & plan of care was discussed. Pt A&O x 4. Pt in CDU for IVF, pain control and antipyretics, IV abx. Tachycardiac and febrile, temp 103.1F, medicated with Ibuprofen 600mg as per MAR. Pt c/o worsening flank pain. Abdomen soft, lower abdomen ttp w/ right CVAT. Pt denies any chest pain, burning with urination, urinary frequency. V/S stable, pt afebrile,  IV in place, patent and free of signs of infiltration. Pt resting in bed. Safety & comfort measures maintained. Call bell in reach. Will continue to monitor.

## 2022-03-27 NOTE — ED CDU PROVIDER INITIAL DAY NOTE - OBJECTIVE STATEMENT
66yo F PMH HTN, HLD, RA on Humira, bl ovarian cystectomy presents with complaint of lower abd pain, fever tmax 39.4 ongoing for the past 4 days. Went to OB at onset of sxs, given fluconazole at that time without relief. Came to the ED yesterday with the same symptoms. Dx with UTI and dc on Keflex. Pt states symptoms have not improved despite abx and tylenol. States pain currently 5/10 intensity. Last took Tylenol 7am this morning. One episode of emesis several days ago otherwise not vomiting    spoke with patient using Polish translation services [id number]

## 2022-03-27 NOTE — ED PROVIDER NOTE - NS ED ROS FT
Gen: No C/NS, +fever   Head: No falls   Eyes: No changes in vision   Resp: No cough   Cardiovascular: No chest pain    Gastroenteric: No N/V/D  :  +frequency +dysuria   MS: No joint or muscle pain  Neuro: No headache   Skin: No new rash

## 2022-03-27 NOTE — ED CDU PROVIDER DISPOSITION NOTE - ATTENDING CONTRIBUTION TO CARE
66yo F PMH HTN, HLD, RA on Humira, bl ovarian cystectomy presented with abd pain, fever tmax 39.4.  She had been treated with fluconazole by pmd but after worsening symptoms and fever, came to the hospital. Evaluated in ED and CT done which demonstrated right-sided pyelonephritis and bilateral pyelitis. Started on abx. and feels some improvement. no vomiting no diarrhea.   Gen.  elderly woman, well appearing. no acute distress  HEENT:  pharynx clear. mmm   Lungs:  b//l bs no crackles no wheezing  CVS: S1S2   Abd;  soft no guarding no distention no cva tend  Ext: no pitting edema no erythema  Neuro: aaox3  MSK: strength 5/5 b/l upper and lower ext.

## 2022-03-27 NOTE — ED CDU PROVIDER INITIAL DAY NOTE - CPE EDP CARDIAC NORM
Not protocol medication. LOV :9/09/17 med check   Last labs done :9/27/17  Next appointment :not yet made. Please see pending medication. Refill if appropriate.    Last refill:    Date:9/9/17  Amount :30 tablet 1 refill  Medication: alprazolam
normal...

## 2022-03-27 NOTE — ED ADULT NURSE NOTE - ED STAT RN HANDOFF DETAILS
Hand off given to Rosalba Jimenez Hand off given to Rosalba Navas. Dr iMguel notified about elevated temp

## 2022-03-27 NOTE — ED PROVIDER NOTE - ATTENDING CONTRIBUTION TO CARE
Attending MD Carson:  I personally have seen and examined this patient. I have performed a substantive portion of the visit including all aspects of the medical decision making.  Resident note reviewed and agree on plan of care and except where noted.      67F with RA on humira presenting for persistent lower abdominal pain, dysuria. The patient was seen in this ED and diagnosed with UTI based on positive UA, on PO keflex without improvement. Examination reveals ttp throughout lower abdomen, seems maximally tender to me in the RLQ. I would consider alternative diagnosis to cystitis such as appendicitis, diverticulitis, colitis. Will obtain CT a/p to rule out, labs, re-eval             *The above represents an initial assessment/impression. Please refer to progress notes for potential changes in patient clinical course*

## 2022-03-27 NOTE — ED ADULT NURSE NOTE - NSIMPLEMENTINTERV_GEN_ALL_ED
Implemented All Universal Safety Interventions:  [place] to call system. Call bell, personal items and telephone within reach. Instruct patient to call for assistance. Room bathroom lighting operational. Non-slip footwear when patient is off stretcher. Physically safe environment: no spills, clutter or unnecessary equipment. Stretcher in lowest position, wheels locked, appropriate side rails in place.

## 2022-03-27 NOTE — ED CDU PROVIDER INITIAL DAY NOTE - ATTENDING CONTRIBUTION TO CARE
Attending MD Carson:   I personally have seen and examined this patient. I have performed a substantive portion of the visit including all aspects of the medical decision making.   Physician assistant note reviewed and agree on plan of care and except where noted.

## 2022-03-27 NOTE — ED PROVIDER NOTE - PHYSICAL EXAMINATION
G: NAD, cooperative with exam   H: NCAT  E: EOMI   M: Mucous membranes moist   R: CTABL, nWOB  C: Nl S1/S2, no mrg  A: Soft, suprapubic ttp, ND, no rebound/guarding

## 2022-03-27 NOTE — ED ADULT NURSE NOTE - OBJECTIVE STATEMENT
0845 67 yr old WF c/o worsening UTI. Was T&R in ER 2 days go. On oral antibiotic for UTI.c/o fever and increasing pain upon urination. A&Ox4. ambulatory. Dr evaluated pt. color pink. skin W&D. no CVA tenderness. Abd soft. sore to palp suprapubic region 0845 67 yr old WF c/o worsening UTI. Was T&R in ER 2 days go. On oral antibiotic for UTI. c/o fever and increasing pain upon urination. A&Ox4. ambulatory. Dr evaluated pt. color pink. skin W&D. no CVA tenderness. Abd soft. sore to palp suprapubic region. ambulated to bathroom to void

## 2022-03-27 NOTE — ED CDU PROVIDER INITIAL DAY NOTE - PROGRESS NOTE DETAILS
CDU PROGRESS NOTE TRISTA CALLE: Received pt at 1900 sign-out. Case/plan reviewed. Pt resting in stretcher, tachycardiac and febrile, temp 103.1F. Pt c/o worsening flank pain. On exam, Abdomen soft, + ttp lower abdomen w/ right CVAT. No Rebound or guarding. Will give Ibuprofen 600mg po, continue IVF and monitor closely. IV ceftriaxone 1gm q 12hr.

## 2022-03-28 DIAGNOSIS — N12 TUBULO-INTERSTITIAL NEPHRITIS, NOT SPECIFIED AS ACUTE OR CHRONIC: ICD-10-CM

## 2022-03-28 LAB
ANION GAP SERPL CALC-SCNC: 9 MMOL/L — SIGNIFICANT CHANGE UP (ref 5–17)
BASOPHILS # BLD AUTO: 0.02 K/UL — SIGNIFICANT CHANGE UP (ref 0–0.2)
BASOPHILS NFR BLD AUTO: 0.3 % — SIGNIFICANT CHANGE UP (ref 0–2)
BUN SERPL-MCNC: 11 MG/DL — SIGNIFICANT CHANGE UP (ref 7–23)
CALCIUM SERPL-MCNC: 7.7 MG/DL — LOW (ref 8.4–10.5)
CHLORIDE SERPL-SCNC: 110 MMOL/L — HIGH (ref 96–108)
CO2 SERPL-SCNC: 23 MMOL/L — SIGNIFICANT CHANGE UP (ref 22–31)
CREAT SERPL-MCNC: 0.77 MG/DL — SIGNIFICANT CHANGE UP (ref 0.5–1.3)
CULTURE RESULTS: NO GROWTH — SIGNIFICANT CHANGE UP
EGFR: 84 ML/MIN/1.73M2 — SIGNIFICANT CHANGE UP
EOSINOPHIL # BLD AUTO: 0 K/UL — SIGNIFICANT CHANGE UP (ref 0–0.5)
EOSINOPHIL NFR BLD AUTO: 0 % — SIGNIFICANT CHANGE UP (ref 0–6)
GLUCOSE BLDC GLUCOMTR-MCNC: 144 MG/DL — HIGH (ref 70–99)
GLUCOSE BLDC GLUCOMTR-MCNC: 90 MG/DL — SIGNIFICANT CHANGE UP (ref 70–99)
GLUCOSE BLDC GLUCOMTR-MCNC: 94 MG/DL — SIGNIFICANT CHANGE UP (ref 70–99)
GLUCOSE SERPL-MCNC: 105 MG/DL — HIGH (ref 70–99)
HCT VFR BLD CALC: 32.1 % — LOW (ref 34.5–45)
HGB BLD-MCNC: 11.2 G/DL — LOW (ref 11.5–15.5)
IMM GRANULOCYTES NFR BLD AUTO: 0.3 % — SIGNIFICANT CHANGE UP (ref 0–1.5)
LYMPHOCYTES # BLD AUTO: 1.42 K/UL — SIGNIFICANT CHANGE UP (ref 1–3.3)
LYMPHOCYTES # BLD AUTO: 21.9 % — SIGNIFICANT CHANGE UP (ref 13–44)
MCHC RBC-ENTMCNC: 33.4 PG — SIGNIFICANT CHANGE UP (ref 27–34)
MCHC RBC-ENTMCNC: 34.9 GM/DL — SIGNIFICANT CHANGE UP (ref 32–36)
MCV RBC AUTO: 95.8 FL — SIGNIFICANT CHANGE UP (ref 80–100)
MONOCYTES # BLD AUTO: 0.64 K/UL — SIGNIFICANT CHANGE UP (ref 0–0.9)
MONOCYTES NFR BLD AUTO: 9.9 % — SIGNIFICANT CHANGE UP (ref 2–14)
NEUTROPHILS # BLD AUTO: 4.39 K/UL — SIGNIFICANT CHANGE UP (ref 1.8–7.4)
NEUTROPHILS NFR BLD AUTO: 67.6 % — SIGNIFICANT CHANGE UP (ref 43–77)
NRBC # BLD: 0 /100 WBCS — SIGNIFICANT CHANGE UP (ref 0–0)
PLATELET # BLD AUTO: 153 K/UL — SIGNIFICANT CHANGE UP (ref 150–400)
POTASSIUM SERPL-MCNC: 3.7 MMOL/L — SIGNIFICANT CHANGE UP (ref 3.5–5.3)
POTASSIUM SERPL-SCNC: 3.7 MMOL/L — SIGNIFICANT CHANGE UP (ref 3.5–5.3)
RBC # BLD: 3.35 M/UL — LOW (ref 3.8–5.2)
RBC # FLD: 13.9 % — SIGNIFICANT CHANGE UP (ref 10.3–14.5)
SODIUM SERPL-SCNC: 142 MMOL/L — SIGNIFICANT CHANGE UP (ref 135–145)
SPECIMEN SOURCE: SIGNIFICANT CHANGE UP
WBC # BLD: 6.49 K/UL — SIGNIFICANT CHANGE UP (ref 3.8–10.5)
WBC # FLD AUTO: 6.49 K/UL — SIGNIFICANT CHANGE UP (ref 3.8–10.5)

## 2022-03-28 PROCEDURE — 99217: CPT | Mod: FS

## 2022-03-28 PROCEDURE — 99254 IP/OBS CNSLTJ NEW/EST MOD 60: CPT

## 2022-03-28 RX ORDER — CEFTRIAXONE 500 MG/1
1000 INJECTION, POWDER, FOR SOLUTION INTRAMUSCULAR; INTRAVENOUS EVERY 24 HOURS
Refills: 0 | Status: DISCONTINUED | OUTPATIENT
Start: 2022-03-28 | End: 2022-04-01

## 2022-03-28 RX ORDER — CALCIUM CARBONATE 500(1250)
1 TABLET ORAL ONCE
Refills: 0 | Status: COMPLETED | OUTPATIENT
Start: 2022-03-28 | End: 2022-03-28

## 2022-03-28 RX ORDER — SODIUM CHLORIDE 9 MG/ML
1000 INJECTION INTRAMUSCULAR; INTRAVENOUS; SUBCUTANEOUS ONCE
Refills: 0 | Status: COMPLETED | OUTPATIENT
Start: 2022-03-28 | End: 2022-03-28

## 2022-03-28 RX ADMIN — Medication 20 MILLIGRAM(S): at 14:52

## 2022-03-28 RX ADMIN — Medication 1 TABLET(S): at 09:57

## 2022-03-28 RX ADMIN — Medication 81 MILLIGRAM(S): at 14:52

## 2022-03-28 RX ADMIN — CEFTRIAXONE 100 MILLIGRAM(S): 500 INJECTION, POWDER, FOR SOLUTION INTRAMUSCULAR; INTRAVENOUS at 08:30

## 2022-03-28 RX ADMIN — SODIUM CHLORIDE 150 MILLILITER(S): 9 INJECTION INTRAMUSCULAR; INTRAVENOUS; SUBCUTANEOUS at 04:07

## 2022-03-28 RX ADMIN — Medication 975 MILLIGRAM(S): at 00:35

## 2022-03-28 RX ADMIN — SODIUM CHLORIDE 1000 MILLILITER(S): 9 INJECTION INTRAMUSCULAR; INTRAVENOUS; SUBCUTANEOUS at 00:35

## 2022-03-28 RX ADMIN — Medication 975 MILLIGRAM(S): at 14:54

## 2022-03-28 RX ADMIN — Medication 975 MILLIGRAM(S): at 17:10

## 2022-03-28 NOTE — H&P ADULT - ASSESSMENT
66yo F PMH HTN, HLD, RA on Humira, bl ovarian cystectomy presents with abd pain, fever tmax 39.4 ongoing for the past 4 days. Went to OB at onset of sxs, given fluconazole at that time without relief. Came to the ED yesterday with the same symptoms. Dx with UTI and dc on Keflex. Pt states symptoms have not improved despite abx and tylenol. States pain currently 5/10 intensity. Last took Tylenol 7am this mornin    # Acute Pyelonephrits- iv abx Ceftriaxone given active infection, immunosuppression.   Follow up urine cultures.     # HTN Hold BP meds     # DM HISS follow up A1c    # RA Hold Methotrexate

## 2022-03-28 NOTE — CONSULT NOTE ADULT - SUBJECTIVE AND OBJECTIVE BOX
Patient is a 67y old  Female who presents with a chief complaint of Fevers x 1 day (28 Mar 2022 16:05)      HPI:  68 yo F PMH HTN, HLD, RA on Humira, ovarian cystectomy presents with abd pain, fever tmax 39.4 ongoing for the past 4 days. Patient went to OB at onset of sxs, given fluconazole at that time without relief.     Patient came to the ED yesterday with the same symptoms. Dx with UTI and dc on Keflex. Pt states symptoms have not improved despite abx and tylenol. States pain currently 5/10 intensity. Last took Tylenol 7am this morning (28 Mar 2022 16:05)      PAST MEDICAL & SURGICAL HISTORY:  Arthritis    HLD (hyperlipidemia)    HTN (hypertension)    Status post bilateral oophorectomy        Social history:   Social History:    Marital Status:   Occupation:   Lives with:     Substance Use :  Tobacco Usage:  (   ) never smoked   (   ) former smoker   (   ) current smoker  (     ) pack year  (        ) last tobacco use date  Alcohol Usage:  Travel:  Pets:          FAMILY HISTORY:  No pertinent family history in first degree relatives        REVIEW OF SYSTEMS  General:	Denies any malaise fatigue or chills. Fevers absent    Skin:No rash  	  Ophthalmologic:Denies any visual complaints,discharge redness or photophobia  	  ENMT:No nasal discharge,headache,sinus congestion or throat pain.No dental complaints    Respiratory and Thorax:No cough,sputum or chest pain.Denies shortness of breath  	  Cardiovascular:	No chest pain,palpitaions or dizziness    Gastrointestinal:	NO nausea,abdominal pain or diarrhea.    Genitourinary:	No dysuria,frequency. No flank pain    Musculoskeletal:	No joint swelling or pain.No weakness    Neurological:No confusion,diziness.No extremity weakness.No bladder or bowel incontinence	    Psychiatric:No delusions or hallucinations	    Hematology/Lymphatics:	No LN swelling.No gum bleeding     Endocrine:	No recent weight gain or loss.No abnormal heat/cold intolerance    Allergic/Immunologic:	No hives or rash     Allergies    fish (Unknown)  lidocaine (Unknown)    Intolerances        Antimicrobials:       MEDICATIONS  (prior antimicrobials ):    cefTRIAXone   IVPB   100 mL/Hr IV Intermittent (22 @ 09:28)    cefTRIAXone   IVPB   100 mL/Hr IV Intermittent (22 @ 08:30)   100 mL/Hr IV Intermittent (22 @ 21:13)             cefTRIAXone   IVPB 1000 milliGRAM(s) IV Intermittent every 24 hours      MEDICATIONS  (STANDING):  aspirin  chewable 81 milliGRAM(s) Oral daily  cefTRIAXone   IVPB 1000 milliGRAM(s) IV Intermittent every 24 hours  dicyclomine 20 milliGRAM(s) Oral daily    MEDICATIONS  (PRN):  acetaminophen     Tablet .. 975 milliGRAM(s) Oral every 6 hours PRN Temp greater or equal to 38C (100.4F), Mild Pain (1 - 3), Moderate Pain (4 - 6)  ibuprofen  Tablet. 600 milliGRAM(s) Oral every 6 hours PRN Temp greater or equal to 38C (100.4F), Mild Pain (1 - 3), Moderate Pain (4 - 6)        Vital Signs Last 24 Hrs  T(C): 38.3 (28 Mar 2022 17:10), Max: 39.5 (27 Mar 2022 20:51)  T(F): 100.9 (28 Mar 2022 17:10), Max: 103.1 (27 Mar 2022 20:51)  HR: 78 (28 Mar 2022 17:10) (68 - 106)  BP: 120/69 (28 Mar 2022 17:10) (91/58 - 133/77)  BP(mean): 85 (28 Mar 2022 16:05) (85 - 85)  RR: 18 (28 Mar 2022 17:10) (18 - 20)  SpO2: 95% (28 Mar 2022 17:10) (95% - 100%)    PHYSICAL EXAM:Pleasant patient in no acute distress.      Constitutional:Comfortable.Awake and alert  No cachexia     Eyes:PERRL EOMI.NO discharge or conjunctival injection    ENMT:No sinus tenderness.No thrush.No pharyngeal exudate or erythema.Fair dental hygiene    Neck:Supple,No LN,no JVD      Respiratory:Good air entry bilaterally,CTA    Cardiovascular:S1 S2 wnl, No murmurs,rub or gallops    Gastrointestinal:Soft BS(+) no tenderness no masses ,No rebound or guarding    Genitourinary:No CVA tendereness     Rectal:    Extremities:No cyanosis,clubbing or edema.    Vascular:peripheral pulses felt    Neurological:AAO X 3,No grossly focal deficits    Skin:No rash     Lymph Nodes:No palpable LNs    Musculoskeletal:No joint swelling or LOM    Psychiatric:Affect normal.                                11.2   6.49  )-----------( 153      ( 28 Mar 2022 05:44 )             32.1     LIVER FUNCTIONS - ( 27 Mar 2022 09:48 )  Alb: 3.8 g/dL / Pro: 7.1 g/dL / ALK PHOS: 62 U/L / ALT: 10 U/L / AST: 11 U/L / GGT: x                 142  |  110<H>  |  11  ----------------------------<  105<H>  3.7   |  23  |  0.77    Ca    7.7<L>      28 Mar 2022 05:44    TPro  7.1  /  Alb  3.8  /  TBili  0.4  /  DBili  x   /  AST  11  /  ALT  10  /  AlkPhos  62            Urinalysis Basic - ( 27 Mar 2022 08:56 )    Color: Colorless / Appearance: Clear / S.006 / pH: x  Gluc: x / Ketone: Negative  / Bili: Negative / Urobili: Negative   Blood: x / Protein: Negative / Nitrite: Negative   Leuk Esterase: Negative / RBC: 0 /hpf / WBC 1 /HPF   Sq Epi: x / Non Sq Epi: 1 /hpf / Bacteria: Negative      Culture - Blood (22 @ 14:23)    Specimen Source: .Blood Blood    Culture Results:   No growth to date.    Culture - Blood (22 @ 14:23)    Specimen Source: .Blood Blood    Culture Results:   No growth to date.      Culture - Urine (22 @ 14:18)    Specimen Source: Clean Catch Clean Catch (Midstream)    Culture Results:   No growth      Culture Results:   >100,000 CFU/ml Escherichia coli (22 @ 10:16)              Radiology:    < from: CT Abdomen and Pelvis w/ IV Cont (22 @ 09:53) >  ACC: [id number] EXAM:  CT ABDOMEN AND PELVIS IC                          PROCEDURE DATE:  2022          INTERPRETATION:  CLINICAL INFORMATION: Abdominal pain.    COMPARISON: CT abdomen and pelvis 2020    CONTRAST/COMPLICATIONS:  IV Contrast: Omnipaque 350  90 cc administered   10 cc discarded  Oral Contrast: NONE  Complications: None reported at time of study completion    PROCEDURE:  CT of the Abdomen and Pelvis was performed.  Sagittal and coronal reformats were performed.    FINDINGS:  LOWER CHEST: Coronary artery calcifications.    LIVER: Within normal limits.  BILE DUCTS: Normal caliber.  GALLBLADDER: Cholelithiasis.  SPLEEN: Within normal limits.  PANCREAS: Within normal limits.  ADRENALS: Unchanged right adrenal adenoma measuring 3.7 cm.  KIDNEYS/URETERS: Abnormally avid enhancement of both renal collecting   systems and focal areas of decreased attenuation in the right kidney   (striated nephrogram).  Bilateral renal cysts.    BLADDER: Within normal limits.  REPRODUCTIVEORGANS: Uterus within normal limits. Status post bilateral   oopherectomy.    BOWEL: Moderate hiatal hernia. Colonic diverticulosis without evidence of   diverticulitis. No bowel obstruction. Appendix is not visualized.  PERITONEUM: No ascites.  VESSELS: Atherosclerosis.  RETROPERITONEUM/LYMPH NODES: No lymphadenopathy.  ABDOMINAL WALL: Small fat-containing umbilical hernia. Fluid collections   within the abdominal wall are no longer visualized.  BONES: Degenerative changes.    IMPRESSION:  Findings suspect for right-sided pyelonephritis and bilateral pyelitis.    --- End of Report ---          PALLAVI CAMPO MD; Resident Radiologist  This document has been electronically signed.  DAQUAN VELA MD; Attending Radiologist  This document has been electronically signed. Mar 27 2022 11:19AM    < end of copied text >           Patient is a 67y old  Female who presents with a chief complaint of Fevers x 1 day (28 Mar 2022 16:05)  History obtained via [facility] # [id number]    HPI:    68 yo F PMH HTN, HLD, RA on Humira, ovarian cystectomy presents with abd pain, fever tmax 39.4 ongoing for the past 4 days. Patient went to OB at onset of sxs, given fluconazole at that time without relief.     Patient came to the ED yesterday with the same symptoms. Dx with UTI and dc on Keflex. Pt states symptoms have not improved despite abx and tylenol. States pain currently 5/10 intensity. Last took Tylenol 7am this morning (28 Mar 2022 16:05)  Pt noted some dysuria when went to OB . Pt denies urgency or frequency. Pt denies cough or GI sxs  Pt is  concerned for ongoing fevers. Pt had a CT with concern for pyelonephritis . Pt was started on Ceftriaxone. Pt notes that when she was a child in [place] [place] she had an allergic reaction to some antibiotic that led to throat feels like its closing. Not sure what antibiotic. ID called as patient remains febrile. Urine is now growing an E Coli pt notes some LLQ pain. 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. Went to [place] in October /November to visit her daughter who had a baby  Pets: denies          FAMILY HISTORY:  mother -  - complications of Diabetes  father-  - old age         REVIEW OF SYSTEMS  General:	fevers    Skin:No rash  	  Ophthalmologic:Denies any visual complaints,discharge redness or photophobia  	  ENMT:No nasal discharge,headache,sinus congestion or throat pain.No dental complaints    Respiratory and Thorax:No cough,sputum or chest pain.Denies shortness of breath  	  Cardiovascular:	No chest pain,palpitaions or dizziness    Gastrointestinal:	NO nausea,abdominal pain or diarrhea.    Genitourinary:	dysuria     Musculoskeletal:	No joint swelling or pain.No weakness    Neurological:No confusion,diziness.No extremity weakness.No bladder or bowel incontinence	    Psychiatric:No delusions or hallucinations	    Hematology/Lymphatics:	No LN swelling.No gum bleeding     Endocrine:	No recent weight gain or loss.No abnormal heat/cold intolerance    Allergic/Immunologic:	No hives or rash     Allergies    fish (Unknown)  lidocaine (Unknown)    Intolerances        Antimicrobials:       MEDICATIONS  (prior antimicrobials ):    cefTRIAXone   IVPB   100 mL/Hr IV Intermittent (22 @ 09:28)    cefTRIAXone   IVPB   100 mL/Hr IV Intermittent (22 @ 08:30)   100 mL/Hr IV Intermittent (22 @ 21:13)             cefTRIAXone   IVPB 1000 milliGRAM(s) IV Intermittent every 24 hours      MEDICATIONS  (STANDING):  aspirin  chewable 81 milliGRAM(s) Oral daily  cefTRIAXone   IVPB 1000 milliGRAM(s) IV Intermittent every 24 hours  dicyclomine 20 milliGRAM(s) Oral daily    MEDICATIONS  (PRN):  acetaminophen     Tablet .. 975 milliGRAM(s) Oral every 6 hours PRN Temp greater or equal to 38C (100.4F), Mild Pain (1 - 3), Moderate Pain (4 - 6)  ibuprofen  Tablet. 600 milliGRAM(s) Oral every 6 hours PRN Temp greater or equal to 38C (100.4F), Mild Pain (1 - 3), Moderate Pain (4 - 6)        Vital Signs Last 24 Hrs  T(C): 38.3 (28 Mar 2022 17:10), Max: 39.5 (27 Mar 2022 20:51)  T(F): 100.9 (28 Mar 2022 17:10), Max: 103.1 (27 Mar 2022 20:51)  HR: 78 (28 Mar 2022 17:10) (68 - 106)  BP: 120/69 (28 Mar 2022 17:10) (91/58 - 133/77)  BP(mean): 85 (28 Mar 2022 16:05) (85 - 85)  RR: 18 (28 Mar 2022 17:10) (18 - 20)  SpO2: 95% (28 Mar 2022 17:10) (95% - 100%)    PHYSICAL EXAM:Pleasant patient in no acute distress.      Constitutional:Comfortable.Awake and alert  No cachexia     Eyes:PERRL EOMI.NO discharge or conjunctival injection    ENMT:No sinus tenderness.No thrush.No pharyngeal exudate or erythema.Fair dental hygiene    Neck:Supple,No LN,no JVD      Respiratory:Good air entry bilaterally,CTA    Cardiovascular:S1 S2     Gastrointestinal:Soft BS(+) no tenderness     Genitourinary:No CVA tendereness     Extremities:No cyanosis,clubbing or edema.    Vascular:peripheral pulses felt    Neurological:AAO X 3,No grossly focal deficits    Skin:No rash     Lymph Nodes:No palpable LNs    Musculoskeletal:No joint swelling or LOM    Psychiatric:Affect normal.                                11.2   6.49  )-----------( 153      ( 28 Mar 2022 05:44 )             32.1     LIVER FUNCTIONS - ( 27 Mar 2022 09:48 )  Alb: 3.8 g/dL / Pro: 7.1 g/dL / ALK PHOS: 62 U/L / ALT: 10 U/L / AST: 11 U/L / GGT: x                 142  |  110<H>  |  11  ----------------------------<  105<H>  3.7   |  23  |  0.77    Ca    7.7<L>      28 Mar 2022 05:44    TPro  7.1  /  Alb  3.8  /  TBili  0.4  /  DBili  x   /  AST  11  /  ALT  10  /  AlkPhos  62            Urinalysis Basic - ( 27 Mar 2022 08:56 )    Color: Colorless / Appearance: Clear / S.006 / pH: x  Gluc: x / Ketone: Negative  / Bili: Negative / Urobili: Negative   Blood: x / Protein: Negative / Nitrite: Negative   Leuk Esterase: Negative / RBC: 0 /hpf / WBC 1 /HPF   Sq Epi: x / Non Sq Epi: 1 /hpf / Bacteria: Negative      Culture - Blood (22 @ 14:23)    Specimen Source: .Blood Blood    Culture Results:   No growth to date.    Culture - Blood (22 @ 14:23)    Specimen Source: .Blood Blood    Culture Results:   No growth to date.      Culture - Urine (22 @ 14:18)    Specimen Source: Clean Catch Clean Catch (Midstream)    Culture Results:   No growth      Culture Results:   >100,000 CFU/ml Escherichia coli (22 @ 10:16)      COVID-19 PCR . (22 @ 09:47)    COVID-19 PCR: NotDete: You can help in the fight against COVID-19. [facility] may contact  you to see if you are interested in voluntarily participating in one of  our clinical trials.  Testing is performed using polymerase chain reaction (PCR) or  transcription mediated amplification (TMA). This COVID-19 (SARS-CoV-2)  nucleic acid amplification test was validated by [facility] and is  in use under the FDA Emergency Use Authorization (EUA) for clinical labs  CLIA-certified to perform high complexity testing. Test results should be  correlated with clinical presentation, patient history, and epidemiology.    Respiratory Viral Panel with COVID-19 by DAMON (22 @ 07:06)    [facility] Result: [facility]    SARS-CoV-2: NotDete: This Respiratory Panel uses polymerase chain reaction (PCR) to detect for  adenovirus; coronavirus (HKU1, NL63, 229E, OC43); human metapneumovirus  (hMPV); human enterovirus/rhinovirus (Entero/RV); influenza A; influenza  A/H1; influenza A/H3; influenza A/H1-2009; influenza B; parainfluenza  viruses 1, 2, 3, 4; respiratory syncytial virus; Mycoplasma pneumoniae;  Chlamydophila pneumoniae; and SARS-CoV-2.            Radiology:    < from: CT Abdomen and Pelvis w/ IV Cont (22 @ 09:53) >  ACC: [id number] EXAM:  CT ABDOMEN AND PELVIS IC                          PROCEDURE DATE:  2022          INTERPRETATION:  CLINICAL INFORMATION: Abdominal pain.    COMPARISON: CT abdomen and pelvis 2020    CONTRAST/COMPLICATIONS:  IV Contrast: Omnipaque 350  90 cc administered   10 cc discarded  Oral Contrast: NONE  Complications: None reported at time of study completion    PROCEDURE:  CT of the Abdomen and Pelvis was performed.  Sagittal and coronal reformats were performed.    FINDINGS:  LOWER CHEST: Coronary artery calcifications.    LIVER: Within normal limits.  BILE DUCTS: Normal caliber.  GALLBLADDER: Cholelithiasis.  SPLEEN: Within normal limits.  PANCREAS: Within normal limits.  ADRENALS: Unchanged right adrenal adenoma measuring 3.7 cm.  KIDNEYS/URETERS: Abnormally avid enhancement of both renal collecting   systems and focal areas of decreased attenuation in the right kidney   (striated nephrogram).  Bilateral renal cysts.    BLADDER: Within normal limits.  REPRODUCTIVEORGANS: Uterus within normal limits. Status post bilateral   oopherectomy.    BOWEL: Moderate hiatal hernia. Colonic diverticulosis without evidence of   diverticulitis. No bowel obstruction. Appendix is not visualized.  PERITONEUM: No ascites.  VESSELS: Atherosclerosis.  RETROPERITONEUM/LYMPH NODES: No lymphadenopathy.  ABDOMINAL WALL: Small fat-containing umbilical hernia. Fluid collections   within the abdominal wall are no longer visualized.  BONES: Degenerative changes.    IMPRESSION:  Findings suspect for right-sided pyelonephritis and bilateral pyelitis.    --- End of Report ---          PALLAVI CAMPO MD; Resident Radiologist  This document has been electronically signed.  DAQUAN VELA MD; Attending Radiologist  This document has been electronically signed. Mar 27 2022 11:19AM    < end of copied text >

## 2022-03-28 NOTE — CONSULT NOTE ADULT - ASSESSMENT
68 yo F PMH HTN, HLD, RA on Humira, ovarian cystectomy presents with abd pain, fever tmax 39.4 ongoing for the past 4 days. Patient went to OB at onset of sxs, given fluconazole at that time without relief.     Patient came to the ED yesterday with the same symptoms. Dx with UTI and dc on Keflex. Pt states symptoms have not improved despite abx and tylenol. States pain currently 5/10 intensity. Last took Tylenol 7am this morning (28 Mar 2022 16:05)  Pt noted some dysuria when went to OB . Pt denies urgency or frequency. Pt denies cough or GI sxs  Pt is  concerned for ongoing fevers. Pt had a CT with concern for pyelonephritis . Pt was started on Ceftriaxone. Pt notes that when she was a child in [facility] she had an allergic reaction to some antibiotic that led to throat feels like its closing. Not sure what antibiotic. ID called as patient remains febrile. Urine is now growing an E Coli pt notes some LLQ pain. Pt denies diarrhea. Pt denies respiratory sxs    Pt has this vague history of anaphylaxis to an antibiotic in her youth-Im concerned it could have been penicillin but truly unclear    A/P  check BC x 2 sets- pending  agree with Rocephin  urology input  consider renal ultrasound  pt denies recent abs- could she have an ESBL organism  I'm a little shy about giving penicillin with her anaphylaxis history- perhaps allergy can skin test her  If temps go up significantly or change in status , can try meropenem   if fevers persist, then check CXR as well.  Pt with RA on Humira  Please check baseline QuantiFeron   Pt fully vaxed and boosted for Covid PCR negative for Covid and neg for flu       Collette Pike M.D. ,   please reach via teams   If no answer, or after 5PM/ weekends,  then please call  [phone]      Assessment and plan discussed with the primary team .   66 yo F PMH HTN, HLD, RA on Humira, ovarian cystectomy presents with abd pain, fever tmax 39.4 ongoing for the past 4 days. Patient went to OB at onset of sxs, given fluconazole at that time without relief.     Patient came to the ED yesterday with the same symptoms. Dx with UTI and dc on Keflex. Pt states symptoms have not improved despite abx and tylenol. States pain currently 5/10 intensity. Last took Tylenol 7am this morning (28 Mar 2022 16:05)  Pt noted some dysuria when went to OB . Pt denies urgency or frequency. Pt denies cough or GI sxs  Pt is  concerned for ongoing fevers. Pt had a CT with concern for pyelonephritis . Pt was started on Ceftriaxone. Pt notes that when she was a child in [place] she had an allergic reaction to some antibiotic that led to throat feels like its closing. Not sure what antibiotic. ID called as patient remains febrile. Urine is now growing an E Coli pt notes some LLQ pain. Pt denies diarrhea. Pt denies respiratory sxs    Pt has this vague history of anaphylaxis to an antibiotic in her youth-Im concerned it could have been penicillin but truly unclear    A/P  check BC x 2 sets- pending  agree with Rocephin  urology input  consider renal ultrasound  pt denies recent abs- could she have an ESBL organism  I'm a little shy about giving penicillin with her anaphylaxis history- perhaps allergy can skin test her  If temps go up significantly or change in status , can try meropenem   if fevers persist, then check CXR as well.  Pt with RA on Humira  Please check baseline QuantiFeron   Pt fully vaxed and boosted for Covid PCR negative for Covid and neg for flu   Pt anemic- ? from hydration  monitor     Collette Pike M.D. ,   please reach via teams   If no answer, or after 5PM/ weekends,  then please call  [phone]      Assessment and plan discussed with the primary team .

## 2022-03-28 NOTE — H&P ADULT - HISTORY OF PRESENT ILLNESS
66 yo F PMH HTN, HLD, RA on Humira, ovarian cystectomy presents with abd pain, fever tmax 39.4 ongoing for the past 4 days. Patient went to OB at onset of sxs, given fluconazole at that time without relief.     Patient came to the ED yesterday with the same symptoms. Dx with UTI and dc on Keflex. Pt states symptoms have not improved despite abx and tylenol. States pain currently 5/10 intensity. Last took Tylenol 7am this morning

## 2022-03-28 NOTE — ED CDU PROVIDER SUBSEQUENT DAY NOTE - HISTORY
CDU PROGRESS NOTE PA ALVA: Pt c/o chills and lower abdominal discomfort. Repeat Temp 100.9F. BP 91/58, HR 84. On Exam, abdomen soft, non distended, + ttp right flank w/ right CVAT. Will give IV bolus and continue IV abx. Tylenol 975mg po given.

## 2022-03-28 NOTE — ED CDU PROVIDER SUBSEQUENT DAY NOTE - MEDICAL DECISION MAKING DETAILS
ATTG: : fever / pyelonephritis with b/l pyelitis, will continue abx, pain medication and re eval for dispo

## 2022-03-28 NOTE — ED CDU PROVIDER SUBSEQUENT DAY NOTE - ATTENDING CONTRIBUTION TO CARE
68yo F PMH HTN, HLD, RA on Humira, bl ovarian cystectomy presented with abd pain, fever tmax 39.4.  She had been treated with fluconazole by pmd but after worsening symptoms and fever, came to the hospital. Evaluated in ED and CT done which demonstrated right-sided pyelonephritis and bilateral pyelitis. Started on abx. and feels some improvement. no vomiting no diarrhea.   Gen.  elderly woman, well appearing. no acute distress  HEENT:  pharynx clear. mmm   Lungs:  b//l bs no crackles no wheezing  CVS: S1S2   Abd;  soft no guarding no distention no cva tend  Ext: no pitting edema no erythema  Neuro: aaox3  MSK: strength 5/5 b/l upper and lower ext.

## 2022-03-28 NOTE — PATIENT PROFILE ADULT - FALL HARM RISK - UNIVERSAL INTERVENTIONS
Bed in lowest position, wheels locked, appropriate side rails in place/Call bell, personal items and telephone in reach/Instruct patient to call for assistance before getting out of bed or chair/Non-slip footwear when patient is out of bed/[place] to call system/Physically safe environment - no spills, clutter or unnecessary equipment/Purposeful Proactive Rounding/Room/bathroom lighting operational, light cord in reach

## 2022-03-29 LAB
-  AMIKACIN: SIGNIFICANT CHANGE UP
-  AMOXICILLIN/CLAVULANIC ACID: SIGNIFICANT CHANGE UP
-  AMPICILLIN/SULBACTAM: SIGNIFICANT CHANGE UP
-  AMPICILLIN: SIGNIFICANT CHANGE UP
-  AZTREONAM: SIGNIFICANT CHANGE UP
-  CEFAZOLIN: SIGNIFICANT CHANGE UP
-  CEFEPIME: SIGNIFICANT CHANGE UP
-  CEFOXITIN: SIGNIFICANT CHANGE UP
-  CEFTRIAXONE: SIGNIFICANT CHANGE UP
-  CIPROFLOXACIN: SIGNIFICANT CHANGE UP
-  ERTAPENEM: SIGNIFICANT CHANGE UP
-  GENTAMICIN: SIGNIFICANT CHANGE UP
-  IMIPENEM: SIGNIFICANT CHANGE UP
-  LEVOFLOXACIN: SIGNIFICANT CHANGE UP
-  MEROPENEM: SIGNIFICANT CHANGE UP
-  NITROFURANTOIN: SIGNIFICANT CHANGE UP
-  PIPERACILLIN/TAZOBACTAM: SIGNIFICANT CHANGE UP
-  TIGECYCLINE: SIGNIFICANT CHANGE UP
-  TOBRAMYCIN: SIGNIFICANT CHANGE UP
-  TRIMETHOPRIM/SULFAMETHOXAZOLE: SIGNIFICANT CHANGE UP
ANION GAP SERPL CALC-SCNC: 12 MMOL/L — SIGNIFICANT CHANGE UP (ref 5–17)
BUN SERPL-MCNC: 8 MG/DL — SIGNIFICANT CHANGE UP (ref 7–23)
CALCIUM SERPL-MCNC: 8.5 MG/DL — SIGNIFICANT CHANGE UP (ref 8.4–10.5)
CHLORIDE SERPL-SCNC: 110 MMOL/L — HIGH (ref 96–108)
CO2 SERPL-SCNC: 22 MMOL/L — SIGNIFICANT CHANGE UP (ref 22–31)
CREAT SERPL-MCNC: 0.68 MG/DL — SIGNIFICANT CHANGE UP (ref 0.5–1.3)
CULTURE RESULTS: SIGNIFICANT CHANGE UP
EGFR: 95 ML/MIN/1.73M2 — SIGNIFICANT CHANGE UP
GLUCOSE BLDC GLUCOMTR-MCNC: 87 MG/DL — SIGNIFICANT CHANGE UP (ref 70–99)
GLUCOSE SERPL-MCNC: 89 MG/DL — SIGNIFICANT CHANGE UP (ref 70–99)
HCT VFR BLD CALC: 33.1 % — LOW (ref 34.5–45)
HGB BLD-MCNC: 10.8 G/DL — LOW (ref 11.5–15.5)
MCHC RBC-ENTMCNC: 31.5 PG — SIGNIFICANT CHANGE UP (ref 27–34)
MCHC RBC-ENTMCNC: 32.6 GM/DL — SIGNIFICANT CHANGE UP (ref 32–36)
MCV RBC AUTO: 96.5 FL — SIGNIFICANT CHANGE UP (ref 80–100)
METHOD TYPE: SIGNIFICANT CHANGE UP
NRBC # BLD: 0 /100 WBCS — SIGNIFICANT CHANGE UP (ref 0–0)
ORGANISM # SPEC MICROSCOPIC CNT: SIGNIFICANT CHANGE UP
ORGANISM # SPEC MICROSCOPIC CNT: SIGNIFICANT CHANGE UP
PLATELET # BLD AUTO: 174 K/UL — SIGNIFICANT CHANGE UP (ref 150–400)
POTASSIUM SERPL-MCNC: 3.2 MMOL/L — LOW (ref 3.5–5.3)
POTASSIUM SERPL-SCNC: 3.2 MMOL/L — LOW (ref 3.5–5.3)
RBC # BLD: 3.43 M/UL — LOW (ref 3.8–5.2)
RBC # FLD: 14.3 % — SIGNIFICANT CHANGE UP (ref 10.3–14.5)
SODIUM SERPL-SCNC: 144 MMOL/L — SIGNIFICANT CHANGE UP (ref 135–145)
SPECIMEN SOURCE: SIGNIFICANT CHANGE UP
WBC # BLD: 5.4 K/UL — SIGNIFICANT CHANGE UP (ref 3.8–10.5)
WBC # FLD AUTO: 5.4 K/UL — SIGNIFICANT CHANGE UP (ref 3.8–10.5)

## 2022-03-29 PROCEDURE — 99232 SBSQ HOSP IP/OBS MODERATE 35: CPT

## 2022-03-29 RX ORDER — METFORMIN HYDROCHLORIDE 850 MG/1
1 TABLET ORAL
Qty: 0 | Refills: 0 | DISCHARGE

## 2022-03-29 RX ORDER — FOLIC ACID 0.8 MG
1 TABLET ORAL
Qty: 0 | Refills: 0 | DISCHARGE

## 2022-03-29 RX ORDER — ADALIMUMAB 40MG/0.8ML
2 KIT SUBCUTANEOUS
Qty: 0 | Refills: 0 | DISCHARGE

## 2022-03-29 RX ORDER — POTASSIUM CHLORIDE 20 MEQ
20 PACKET (EA) ORAL
Refills: 0 | Status: COMPLETED | OUTPATIENT
Start: 2022-03-29 | End: 2022-03-29

## 2022-03-29 RX ORDER — ESOMEPRAZOLE MAGNESIUM 40 MG/1
1 CAPSULE, DELAYED RELEASE ORAL
Qty: 0 | Refills: 0 | DISCHARGE

## 2022-03-29 RX ORDER — NORTRIPTYLINE HYDROCHLORIDE 10 MG/1
1 CAPSULE ORAL
Qty: 0 | Refills: 0 | DISCHARGE

## 2022-03-29 RX ORDER — ASPIRIN/CALCIUM CARB/MAGNESIUM 324 MG
1 TABLET ORAL
Qty: 0 | Refills: 0 | DISCHARGE

## 2022-03-29 RX ORDER — TRIAMTERENE/HYDROCHLOROTHIAZID 75 MG-50MG
1 TABLET ORAL
Qty: 0 | Refills: 0 | DISCHARGE

## 2022-03-29 RX ORDER — ATORVASTATIN CALCIUM 80 MG/1
1 TABLET, FILM COATED ORAL
Qty: 0 | Refills: 0 | DISCHARGE

## 2022-03-29 RX ORDER — OMEPRAZOLE 10 MG/1
1 CAPSULE, DELAYED RELEASE ORAL
Qty: 0 | Refills: 0 | DISCHARGE

## 2022-03-29 RX ORDER — ERGOCALCIFEROL 1.25 MG/1
1 CAPSULE ORAL
Qty: 0 | Refills: 0 | DISCHARGE

## 2022-03-29 RX ORDER — ABALOPARATIDE 2000 UG/ML
80 INJECTION, SOLUTION SUBCUTANEOUS
Qty: 0 | Refills: 0 | DISCHARGE

## 2022-03-29 RX ADMIN — Medication 975 MILLIGRAM(S): at 00:38

## 2022-03-29 RX ADMIN — Medication 81 MILLIGRAM(S): at 11:55

## 2022-03-29 RX ADMIN — Medication 975 MILLIGRAM(S): at 14:31

## 2022-03-29 RX ADMIN — Medication 20 MILLIEQUIVALENT(S): at 18:15

## 2022-03-29 RX ADMIN — CEFTRIAXONE 100 MILLIGRAM(S): 500 INJECTION, POWDER, FOR SOLUTION INTRAMUSCULAR; INTRAVENOUS at 10:30

## 2022-03-29 RX ADMIN — Medication 975 MILLIGRAM(S): at 00:33

## 2022-03-29 RX ADMIN — Medication 20 MILLIGRAM(S): at 11:56

## 2022-03-29 RX ADMIN — Medication 975 MILLIGRAM(S): at 14:01

## 2022-03-29 RX ADMIN — Medication 20 MILLIEQUIVALENT(S): at 22:22

## 2022-03-29 RX ADMIN — Medication 20 MILLIEQUIVALENT(S): at 20:13

## 2022-03-29 NOTE — PHARMACOTHERAPY INTERVENTION NOTE - COMMENTS
67F PMH HTN, HLD, RA on Humira, bl ovarian cystectomy presents with UTI. Recently discharged on 3/26 with cephalexin 500 mg BID. Patient placed on ceftriaxone 1g Q12H. Recommend Q24H frequency as indicated for UTI.    Velia Brito, PharmD  PGY2 Critical Care Pharmacy Resident  Spectra [phone]
Medication reconciliation completed. Please refer to specifics in home medication list (outpatient medication review). Medications verified with patient and [Pharmacy name, phone #, spoke to [facility], technician].    -------------------------------------------------------------------------------------------  Home Medications:  acetaminophen 325 mg oral tablet: 3 tab(s) orally every 6 hours, As needed, Mild Pain (1 - 3)  amLODIPine 5 mg oral tablet: 1 tab(s) orally once a day  atorvastatin 10 mg oral tablet: 1 tab(s) orally once a day  cephalexin 500 mg oral tablet: 1 tab(s) orally 2 times a day (pt received 2 doses prior to admission)    Added:  Tymlos 3120 mcg/1.56 mL subcutaneous solution: 80 microgram(s) subcutaneous once a day (osteoporosis) - pt skips dose on humira and methotrexate days   Humira Pen 40 mg/0.4 mL subcutaneous kit: 2 dose(s) subcutaneous once a month   metFORMIN 500 mg oral tablet: 1 tab(s) orally 2 times a day (with meals)   methotrexate 2.5 mg oral tablet: 5 tab(s) orally once a week (Tuesdays)   metoprolol succinate 25 mg oral tablet, extended release: 1 tab(s) orally once a day   nortriptyline 10 mg oral capsule: 1 cap(s) orally once a day (at bedtime)   valsartan-hydrochlorothiazide 160 mg-12.5 mg oral tablet: 1 tab(s) orally once a day   aspirin 81 mg oral delayed release tablet: 1 tab(s) orally once a day   dicyclomine 20 mg oral tablet: 1 tab(s) orally once a day, As Needed  fluconazole 150 mg oral tablet: 1 tab(s) orally once x1 dose (3/23)  Vitamin D2 1.25 mg (50,000 intl units) oral capsule: 1 cap(s) orally once a week   folic acid 1 mg oral tablet: 1 tab(s) orally once a day   vitamins: Vitamin B12, Vitamin B2, Vitamin C, Calcium, fish oil     Changed:  omeprazole 40 mg oral delayed release capsule: 1 cap(s) orally once a day  (previously esomeprazole)  -------------------------------------------------------------------------------------------  Note: In addition to valsartan-HCTZ 160-12.5mg tab daily, patient states that she also takes triamterene-HCTZ 37.5-25mg tab PO daily (though it was crossed off on her list, and no record from pharmacy).    Time spent: 20 minutes     Shun Garcia, PharmD, BCPS  [phone]  Available on [employer] Teams

## 2022-03-29 NOTE — PROGRESS NOTE ADULT - SUBJECTIVE AND OBJECTIVE BOX
Patient is a 67y old  Female who presents with a chief complaint of Fevers x 1 day (29 Mar 2022 12:11)      SUBJECTIVE / OVERNIGHT EVENTS: no events over night     MEDICATIONS  (STANDING):  aspirin  chewable 81 milliGRAM(s) Oral daily  cefTRIAXone   IVPB 1000 milliGRAM(s) IV Intermittent every 24 hours  dicyclomine 20 milliGRAM(s) Oral daily    MEDICATIONS  (PRN):  acetaminophen     Tablet .. 975 milliGRAM(s) Oral every 6 hours PRN Temp greater or equal to 38C (100.4F), Mild Pain (1 - 3), Moderate Pain (4 - 6)  ibuprofen  Tablet. 600 milliGRAM(s) Oral every 6 hours PRN Temp greater or equal to 38C (100.4F), Mild Pain (1 - 3), Moderate Pain (4 - 6)      CAPILLARY BLOOD GLUCOSE      POCT Blood Glucose.: 87 mg/dL (29 Mar 2022 09:14)    I&O's Summary    29 Mar 2022 07:01  -  29 Mar 2022 23:18  --------------------------------------------------------  IN: 1080 mL / OUT: 0 mL / NET: 1080 mL      T(C): 36.8 (03-29-22 @ 21:06), Max: 36.8 (03-29-22 @ 21:06)  HR: 69 (03-29-22 @ 21:06) (69 - 76)  BP: 107/73 (03-29-22 @ 21:06) (107/73 - 120/72)  RR: 18 (03-29-22 @ 21:06) (18 - 18)  SpO2: 96% (03-29-22 @ 21:06) (96% - 99%)    PHYSICAL EXAM:  GENERAL: NAD, well-developed  HEAD:  Atraumatic, Normocephalic  EYES: EOMI, PERRLA, conjunctiva and sclera clear  NECK: Supple, No JVD  CHEST/LUNG: Clear to auscultation bilaterally; No wheeze  HEART: Regular rate and rhythm; No murmurs, rubs, or gallops  ABDOMEN: Soft, Nontender, Nondistended; Bowel sounds present  EXTREMITIES:  2+ Peripheral Pulses, No clubbing, cyanosis, or edema  PSYCH: AAOx3  NEUROLOGY: non-focal  SKIN: No rashes or lesions    LABS:                        10.8   5.40  )-----------( 174      ( 29 Mar 2022 07:38 )             33.1     03-29    144  |  110<H>  |  8   ----------------------------<  89  3.2<L>   |  22  |  0.68    Ca    8.5      29 Mar 2022 07:38                RADIOLOGY & ADDITIONAL TESTS:    Imaging Personally Reviewed:    Consultant(s) Notes Reviewed:      Care Discussed with Consultants/Other Providers:

## 2022-03-29 NOTE — PROGRESS NOTE ADULT - SUBJECTIVE AND OBJECTIVE BOX
Patient is a 67y old  Female who presents with a chief complaint of Fevers x 1 day (28 Mar 2022 17:52)    Being followed by ID for        Interval history:  No other acute events      ROS:  No cough,SOB,CP  No N/V/D  No abd pain  No urinary complaints  No HA  No joint or limb pain  No other complaints    PAST MEDICAL & SURGICAL HISTORY:  Arthritis    HLD (hyperlipidemia)    HTN (hypertension)    Status post bilateral oophorectomy      Allergies    lidocaine (Unknown)  penicillin (Anaphylaxis)    Intolerances      Antimicrobials:    cefTRIAXone   IVPB 1000 milliGRAM(s) IV Intermittent every 24 hours    MEDICATIONS  (STANDING):  aspirin  chewable 81 milliGRAM(s) Oral daily  cefTRIAXone   IVPB 1000 milliGRAM(s) IV Intermittent every 24 hours  dicyclomine 20 milliGRAM(s) Oral daily      Vital Signs Last 24 Hrs  T(C): 36.4 (03-29-22 @ 12:02), Max: 38.3 (03-28-22 @ 17:10)  T(F): 97.5 (03-29-22 @ 12:02), Max: 100.9 (03-28-22 @ 17:10)  HR: 76 (03-29-22 @ 12:02) (69 - 90)  BP: 120/72 (03-29-22 @ 12:02) (103/67 - 131/75)  BP(mean): 85 (03-28-22 @ 16:05) (85 - 85)  RR: 18 (03-29-22 @ 12:02) (18 - 20)  SpO2: 99% (03-29-22 @ 12:02) (94% - 99%)    Physical Exam:    Constitutional well preserved,comfortable,pleasant    HEENT PERRLA EOMI,No pallor or icterus    No oral exudate or erythema    Neck supple no JVD or LN    Chest Good AE,CTA    CVS RRR S1 S2 WNl No murmur or rub or gallop    Abd soft BS normal No tenderness no masses    Ext No cyanosis clubbing or edema    IV site no erythema tenderness or discharge    Joints no swelling or LOM    CNS AAO X 3 no focal    Lab Data:                          10.8   5.40  )-----------( 174      ( 29 Mar 2022 07:38 )             33.1       03-29    144  |  110<H>  |  8   ----------------------------<  89  3.2<L>   |  22  |  0.68    Ca    8.5      29 Mar 2022 07:38            .Blood Blood  03-27-22   No growth to date.  --  --      Clean Catch Clean Catch (Midstream)  03-27-22   No growth  --  --      Clean Catch Clean Catch (Midstream)  03-26-22   >100,000 CFU/ml Escherichia coli  --  Escherichia coli    Culture - Urine (03.26.22 @ 10:16)    -  Amikacin: S <=16    -  Amoxicillin/Clavulanic Acid: S <=8/4    -  Ampicillin: R >16 These ampicillin results predict results for amoxicillin    -  Ampicillin/Sulbactam: I 16/8 Enterobacter, Klebsiella aerogenes, Citrobacter, and Serratia may develop resistance during prolonged therapy (3-4 days)    -  Aztreonam: S <=4    -  Cefazolin: S <=2 (MIC_CL_COM_[email or web address]) For uncomplicated UTI with K. pneumoniae, E. coli, or P. mirablis: ARYAN <=16 is sensitive and ARYAN >=32 is resistant. This also predicts results for oral agents cefaclor, cefdinir, cefpodoxime, cefprozil, cefuroxime axetil, cephalexin and locarbef for uncomplicated UTI. Note that some isolates may be susceptible to these agents while testing resistant to cefazolin.    -  Cefepime: S <=2    -  Cefoxitin: S <=8    -  Ceftriaxone: S <=1 Enterobacter, Klebsiella aerogenes, Citrobacter, and Serratia may develop resistance during prolonged therapy    -  Ciprofloxacin: R >2    -  Ertapenem: S <=0.5    -  Gentamicin: S <=2    -  Imipenem: S <=1    -  Levofloxacin: R >4    -  Meropenem: S <=1    -  Nitrofurantoin: S <=32 Should not be used to treat pyelonephritis    -  Piperacillin/Tazobactam: S <=8    -  Tigecycline: S <=2    -  Tobramycin: S <=2    -  Trimethoprim/Sulfamethoxazole: R >2/38    Specimen Source: Clean Catch Clean Catch (Midstream)    Culture Results:   >100,000 CFU/ml Escherichia coli    Organism Identification: Escherichia coli    Organism: Escherichia coli    Method Type: ARYAN        WBC Count: 5.40 (03-29-22 @ 07:38)  WBC Count: 6.49 (03-28-22 @ 05:44)  WBC Count: 9.80 (03-27-22 @ 09:48)  WBC Count: 10.62 (03-26-22 @ 06:53)    < from: CT Abdomen and Pelvis w/ IV Cont (03.27.22 @ 09:53) >    ACC: [id number] EXAM:  CT ABDOMEN AND PELVIS IC                          PROCEDURE DATE:  03/27/2022          INTERPRETATION:  CLINICAL INFORMATION: Abdominal pain.    COMPARISON: CT abdomen and pelvis 8/5/2020    CONTRAST/COMPLICATIONS:  IV Contrast: Omnipaque 350  90 cc administered   10 cc discarded  Oral Contrast: NONE  Complications: None reported at time of study completion    PROCEDURE:  CT of the Abdomen and Pelvis was performed.  Sagittal and coronal reformats were performed.    FINDINGS:  LOWER CHEST: Coronary artery calcifications.    LIVER: Within normal limits.  BILE DUCTS: Normal caliber.  GALLBLADDER: Cholelithiasis.  SPLEEN: Within normal limits.  PANCREAS: Within normal limits.  ADRENALS: Unchanged right adrenal adenoma measuring 3.7 cm.  KIDNEYS/URETERS: Abnormally avid enhancement of both renal collecting   systems and focal areas of decreased attenuation in the right kidney   (striated nephrogram).  Bilateral renal cysts.    BLADDER: Within normal limits.  REPRODUCTIVEORGANS: Uterus within normal limits. Status post bilateral   oopherectomy.    BOWEL: Moderate hiatal hernia. Colonic diverticulosis without evidence of   diverticulitis. No bowel obstruction. Appendix is not visualized.  PERITONEUM: No ascites.  VESSELS: Atherosclerosis.  RETROPERITONEUM/LYMPH NODES: No lymphadenopathy.  ABDOMINAL WALL: Small fat-containing umbilical hernia. Fluid collections   within the abdominal wall are no longer visualized.  BONES: Degenerative changes.    IMPRESSION:  Findings suspect for right-sided pyelonephritis and bilateral pyelitis.    --- End of Report ---    PALLAVI CAMPO MD; Resident Radiologist  This document has been electronically signed.  DAQUAN VELA MD; Attending Radiologist  This document has been electronically signed. Mar 27 2022 11:19AM    < end of copied text >           Patient is a 67y old  Female who presents with a chief complaint of Fevers x 1 day (28 Mar 2022 17:52)    Being followed by ID for pyelonephritis        Interval history:  temp is trending down  pt c/o headache  no meningismus  no diarrhea  no urinary sxs  no cough  no joint or limb pain  No other acute events        PAST MEDICAL & SURGICAL HISTORY:  Arthritis    HLD (hyperlipidemia)    HTN (hypertension)    Status post bilateral oophorectomy      Allergies    lidocaine (Unknown)  penicillin (Anaphylaxis)    Intolerances      Antimicrobials:    cefTRIAXone   IVPB 1000 milliGRAM(s) IV Intermittent every 24 hours    MEDICATIONS  (STANDING):  aspirin  chewable 81 milliGRAM(s) Oral daily  cefTRIAXone   IVPB 1000 milliGRAM(s) IV Intermittent every 24 hours  dicyclomine 20 milliGRAM(s) Oral daily      Vital Signs Last 24 Hrs  T(C): 36.4 (03-29-22 @ 12:02), Max: 38.3 (03-28-22 @ 17:10)  T(F): 97.5 (03-29-22 @ 12:02), Max: 100.9 (03-28-22 @ 17:10)  HR: 76 (03-29-22 @ 12:02) (69 - 90)  BP: 120/72 (03-29-22 @ 12:02) (103/67 - 131/75)  BP(mean): 85 (03-28-22 @ 16:05) (85 - 85)  RR: 18 (03-29-22 @ 12:02) (18 - 20)  SpO2: 99% (03-29-22 @ 12:02) (94% - 99%)    Physical Exam:    Constitutional well preserved,comfortable,pleasant    HEENT PERRLA EOMI,No pallor or icterus    No oral exudate or erythema    Neck supple no JVD or LN    Chest Good AE,CTA    CVS RRR S1 S2     Abd soft BS normal No tenderness     Ext No cyanosis clubbing or edema    IV site no erythema tenderness or discharge    Joints no swelling or LOM    CNS AAO X 3 no focal    Lab Data:                          10.8   5.40  )-----------( 174      ( 29 Mar 2022 07:38 )             33.1       03-29    144  |  110<H>  |  8   ----------------------------<  89  3.2<L>   |  22  |  0.68    Ca    8.5      29 Mar 2022 07:38            .Blood Blood  03-27-22   No growth to date.  --  --      Clean Catch Clean Catch (Midstream)  03-27-22   No growth  --  --      Clean Catch Clean Catch (Midstream)  03-26-22   >100,000 CFU/ml Escherichia coli  --  Escherichia coli    Culture - Urine (03.26.22 @ 10:16)    -  Amikacin: S <=16    -  Amoxicillin/Clavulanic Acid: S <=8/4    -  Ampicillin: R >16 These ampicillin results predict results for amoxicillin    -  Ampicillin/Sulbactam: I 16/8 Enterobacter, Klebsiella aerogenes, Citrobacter, and Serratia may develop resistance during prolonged therapy (3-4 days)    -  Aztreonam: S <=4    -  Cefazolin: S <=2 (MIC_CL_COM_ENTERIC_CEFAZU) For uncomplicated UTI with K. pneumoniae, E. coli, or P. mirablis: ARYAN <=16 is sensitive and ARYAN >=32 is resistant. This also predicts results for oral agents cefaclor, cefdinir, cefpodoxime, cefprozil, cefuroxime axetil, cephalexin and locarbef for uncomplicated UTI. Note that some isolates may be susceptible to these agents while testing resistant to cefazolin.    -  Cefepime: S <=2    -  Cefoxitin: S <=8    -  Ceftriaxone: S <=1 Enterobacter, Klebsiella aerogenes, Citrobacter, and Serratia may develop resistance during prolonged therapy    -  Ciprofloxacin: R >2    -  Ertapenem: S <=0.5    -  Gentamicin: S <=2    -  Imipenem: S <=1    -  Levofloxacin: R >4    -  Meropenem: S <=1    -  Nitrofurantoin: S <=32 Should not be used to treat pyelonephritis    -  Piperacillin/Tazobactam: S <=8    -  Tigecycline: S <=2    -  Tobramycin: S <=2    -  Trimethoprim/Sulfamethoxazole: R >2/38    Specimen Source: Clean Catch Clean Catch (Midstream)    Culture Results:   >100,000 CFU/ml Escherichia coli    Organism Identification: Escherichia coli    Organism: Escherichia coli    Method Type: ARYAN        WBC Count: 5.40 (03-29-22 @ 07:38)  WBC Count: 6.49 (03-28-22 @ 05:44)  WBC Count: 9.80 (03-27-22 @ 09:48)  WBC Count: 10.62 (03-26-22 @ 06:53)    < from: CT Abdomen and Pelvis w/ IV Cont (03.27.22 @ 09:53) >    ACC: [id number] EXAM:  CT ABDOMEN AND PELVIS IC                          PROCEDURE DATE:  03/27/2022          INTERPRETATION:  CLINICAL INFORMATION: Abdominal pain.    COMPARISON: CT abdomen and pelvis 8/5/2020    CONTRAST/COMPLICATIONS:  IV Contrast: Omnipaque 350  90 cc administered   10 cc discarded  Oral Contrast: NONE  Complications: None reported at time of study completion    PROCEDURE:  CT of the Abdomen and Pelvis was performed.  Sagittal and coronal reformats were performed.    FINDINGS:  LOWER CHEST: Coronary artery calcifications.    LIVER: Within normal limits.  BILE DUCTS: Normal caliber.  GALLBLADDER: Cholelithiasis.  SPLEEN: Within normal limits.  PANCREAS: Within normal limits.  ADRENALS: Unchanged right adrenal adenoma measuring 3.7 cm.  KIDNEYS/URETERS: Abnormally avid enhancement of both renal collecting   systems and focal areas of decreased attenuation in the right kidney   (striated nephrogram).  Bilateral renal cysts.    BLADDER: Within normal limits.  REPRODUCTIVEORGANS: Uterus within normal limits. Status post bilateral   oopherectomy.    BOWEL: Moderate hiatal hernia. Colonic diverticulosis without evidence of   diverticulitis. No bowel obstruction. Appendix is not visualized.  PERITONEUM: No ascites.  VESSELS: Atherosclerosis.  RETROPERITONEUM/LYMPH NODES: No lymphadenopathy.  ABDOMINAL WALL: Small fat-containing umbilical hernia. Fluid collections   within the abdominal wall are no longer visualized.  BONES: Degenerative changes.    IMPRESSION:  Findings suspect for right-sided pyelonephritis and bilateral pyelitis.    --- End of Report ---    PALLAVI CAMPO MD; Resident Radiologist  This document has been electronically signed.  DAQUAN VELA MD; Attending Radiologist  This document has been electronically signed. Mar 27 2022 11:19AM    < end of copied text >

## 2022-03-30 LAB
ANION GAP SERPL CALC-SCNC: 9 MMOL/L — SIGNIFICANT CHANGE UP (ref 5–17)
BUN SERPL-MCNC: 10 MG/DL — SIGNIFICANT CHANGE UP (ref 7–23)
CALCIUM SERPL-MCNC: 9.4 MG/DL — SIGNIFICANT CHANGE UP (ref 8.4–10.5)
CHLORIDE SERPL-SCNC: 107 MMOL/L — SIGNIFICANT CHANGE UP (ref 96–108)
CO2 SERPL-SCNC: 26 MMOL/L — SIGNIFICANT CHANGE UP (ref 22–31)
CREAT SERPL-MCNC: 0.66 MG/DL — SIGNIFICANT CHANGE UP (ref 0.5–1.3)
EGFR: 96 ML/MIN/1.73M2 — SIGNIFICANT CHANGE UP
GAMMA INTERFERON BACKGROUND BLD IA-ACNC: 0.19 IU/ML — SIGNIFICANT CHANGE UP
GLUCOSE SERPL-MCNC: 127 MG/DL — HIGH (ref 70–99)
HCT VFR BLD CALC: 36.7 % — SIGNIFICANT CHANGE UP (ref 34.5–45)
HGB BLD-MCNC: 12 G/DL — SIGNIFICANT CHANGE UP (ref 11.5–15.5)
M TB IFN-G BLD-IMP: POSITIVE
M TB IFN-G CD4+ BCKGRND COR BLD-ACNC: 1.47 IU/ML — SIGNIFICANT CHANGE UP
M TB IFN-G CD4+CD8+ BCKGRND COR BLD-ACNC: 0.81 IU/ML — SIGNIFICANT CHANGE UP
MCHC RBC-ENTMCNC: 31.2 PG — SIGNIFICANT CHANGE UP (ref 27–34)
MCHC RBC-ENTMCNC: 32.7 GM/DL — SIGNIFICANT CHANGE UP (ref 32–36)
MCV RBC AUTO: 95.3 FL — SIGNIFICANT CHANGE UP (ref 80–100)
NRBC # BLD: 0 /100 WBCS — SIGNIFICANT CHANGE UP (ref 0–0)
PLATELET # BLD AUTO: 223 K/UL — SIGNIFICANT CHANGE UP (ref 150–400)
POTASSIUM SERPL-MCNC: 4.5 MMOL/L — SIGNIFICANT CHANGE UP (ref 3.5–5.3)
POTASSIUM SERPL-SCNC: 4.5 MMOL/L — SIGNIFICANT CHANGE UP (ref 3.5–5.3)
QUANT TB PLUS MITOGEN MINUS NIL: 9.81 IU/ML — SIGNIFICANT CHANGE UP
RBC # BLD: 3.85 M/UL — SIGNIFICANT CHANGE UP (ref 3.8–5.2)
RBC # FLD: 14.2 % — SIGNIFICANT CHANGE UP (ref 10.3–14.5)
SODIUM SERPL-SCNC: 142 MMOL/L — SIGNIFICANT CHANGE UP (ref 135–145)
WBC # BLD: 4.5 K/UL — SIGNIFICANT CHANGE UP (ref 3.8–10.5)
WBC # FLD AUTO: 4.5 K/UL — SIGNIFICANT CHANGE UP (ref 3.8–10.5)

## 2022-03-30 PROCEDURE — 99232 SBSQ HOSP IP/OBS MODERATE 35: CPT

## 2022-03-30 RX ADMIN — Medication 81 MILLIGRAM(S): at 11:29

## 2022-03-30 RX ADMIN — CEFTRIAXONE 100 MILLIGRAM(S): 500 INJECTION, POWDER, FOR SOLUTION INTRAMUSCULAR; INTRAVENOUS at 10:17

## 2022-03-30 RX ADMIN — Medication 20 MILLIGRAM(S): at 11:28

## 2022-03-30 NOTE — PROGRESS NOTE ADULT - SUBJECTIVE AND OBJECTIVE BOX
Patient is a 67y old  Female who presents with a chief complaint of Fevers x 1 day (29 Mar 2022 12:11)      SUBJECTIVE / OVERNIGHT EVENTS: no events over night     T(C): 36.7 (03-30-22 @ 12:19), Max: 36.7 (03-30-22 @ 12:19)  HR: 76 (03-30-22 @ 12:19) (76 - 76)  BP: 150/89 (03-30-22 @ 12:19) (150/89 - 150/89)  RR: 18 (03-30-22 @ 12:19) (18 - 18)  SpO2: 98% (03-30-22 @ 12:19) (98% - 98%)    MEDICATIONS  (STANDING):  aspirin  chewable 81 milliGRAM(s) Oral daily  cefTRIAXone   IVPB 1000 milliGRAM(s) IV Intermittent every 24 hours  dicyclomine 20 milliGRAM(s) Oral daily    MEDICATIONS  (PRN):  acetaminophen     Tablet .. 975 milliGRAM(s) Oral every 6 hours PRN Temp greater or equal to 38C (100.4F), Mild Pain (1 - 3), Moderate Pain (4 - 6)  ibuprofen  Tablet. 600 milliGRAM(s) Oral every 6 hours PRN Temp greater or equal to 38C (100.4F), Mild Pain (1 - 3), Moderate Pain (4 - 6)    PHYSICAL EXAM:  GENERAL: NAD, well-developed  HEAD:  Atraumatic, Normocephalic  EYES: EOMI, PERRLA, conjunctiva and sclera clear  NECK: Supple, No JVD  CHEST/LUNG: Clear to auscultation bilaterally; No wheeze  HEART: Regular rate and rhythm; No murmurs, rubs, or gallops  ABDOMEN: Soft, Nontender, Nondistended; Bowel sounds present  EXTREMITIES:  2+ Peripheral Pulses, No clubbing, cyanosis, or edema  PSYCH: AAOx3  NEUROLOGY: non-focal  SKIN: No rashes or lesions                          12.0   4.50  )-----------( 223      ( 30 Mar 2022 08:25 )             36.7               142|107|10<127  4.5|26|0.66  9.4,--,--  03-30 @ 08:25            RADIOLOGY & ADDITIONAL TESTS:    Imaging Personally Reviewed:    Consultant(s) Notes Reviewed:      Care Discussed with Consultants/Other Providers:

## 2022-03-30 NOTE — PROGRESS NOTE ADULT - SUBJECTIVE AND OBJECTIVE BOX
Patient is a 67y old  Female who presents with a chief complaint of Fevers x 1 day (29 Mar 2022 23:17)    Being followed by ID for        Interval history:  No other acute events      ROS:  No cough,SOB,CP  No N/V/D  No abd pain  No urinary complaints  No HA  No joint or limb pain  No other complaints    PAST MEDICAL & SURGICAL HISTORY:  Arthritis    HLD (hyperlipidemia)    HTN (hypertension)    Status post bilateral oophorectomy      Allergies    lidocaine (Unknown)  penicillin (Anaphylaxis)    Intolerances      Antimicrobials:    cefTRIAXone   IVPB 1000 milliGRAM(s) IV Intermittent every 24 hours    MEDICATIONS  (STANDING):  aspirin  chewable 81 milliGRAM(s) Oral daily  cefTRIAXone   IVPB 1000 milliGRAM(s) IV Intermittent every 24 hours  dicyclomine 20 milliGRAM(s) Oral daily      Vital Signs Last 24 Hrs  T(C): 36.7 (03-30-22 @ 04:23), Max: 36.9 (03-29-22 @ 23:53)  T(F): 98 (03-30-22 @ 04:23), Max: 98.4 (03-29-22 @ 23:53)  HR: 65 (03-30-22 @ 04:23) (62 - 76)  BP: 150/80 (03-30-22 @ 04:23) (106/65 - 150/80)  BP(mean): --  RR: 18 (03-30-22 @ 04:23) (18 - 18)  SpO2: 96% (03-30-22 @ 04:23) (96% - 99%)    Physical Exam:    Constitutional well preserved,comfortable,pleasant    HEENT PERRLA EOMI,No pallor or icterus    No oral exudate or erythema    Neck supple no JVD or LN    Chest Good AE,CTA    CVS RRR S1 S2 WNl No murmur or rub or gallop    Abd soft BS normal No tenderness no masses    Ext No cyanosis clubbing or edema    IV site no erythema tenderness or discharge    Joints no swelling or LOM    CNS AAO X 3 no focal    Lab Data:                          12.0   4.50  )-----------( 223      ( 30 Mar 2022 08:25 )             36.7       03-30    142  |  107  |  10  ----------------------------<  127<H>  4.5   |  26  |  0.66    Ca    9.4      30 Mar 2022 08:25            .Blood Blood  03-27-22   No growth to date.  --  --      Clean Catch Clean Catch (Midstream)  03-27-22   No growth  --  --      Clean Catch Clean Catch (Midstream)  03-26-22   >100,000 CFU/ml Escherichia coli  --  Escherichia coli                    WBC Count: 4.50 (03-30-22 @ 08:25)  WBC Count: 5.40 (03-29-22 @ 07:38)  WBC Count: 6.49 (03-28-22 @ 05:44)  WBC Count: 9.80 (03-27-22 @ 09:48)  WBC Count: 10.62 (03-26-22 @ 06:53)             Patient is a 67y old  Female who presents with a chief complaint of Fevers x 1 day (29 Mar 2022 23:17)    Being followed by ID for fevers        Interval history:  temps better  no urinary sx  occasional headache  some dizziness   No other acute events        PAST MEDICAL & SURGICAL HISTORY:  Arthritis    HLD (hyperlipidemia)    HTN (hypertension)    Status post bilateral oophorectomy      Allergies    lidocaine (Unknown)  penicillin (Anaphylaxis)    Intolerances      Antimicrobials:    cefTRIAXone   IVPB 1000 milliGRAM(s) IV Intermittent every 24 hours    MEDICATIONS  (STANDING):  aspirin  chewable 81 milliGRAM(s) Oral daily  cefTRIAXone   IVPB 1000 milliGRAM(s) IV Intermittent every 24 hours  dicyclomine 20 milliGRAM(s) Oral daily      Vital Signs Last 24 Hrs  T(C): 36.7 (03-30-22 @ 04:23), Max: 36.9 (03-29-22 @ 23:53)  T(F): 98 (03-30-22 @ 04:23), Max: 98.4 (03-29-22 @ 23:53)  HR: 65 (03-30-22 @ 04:23) (62 - 76)  BP: 150/80 (03-30-22 @ 04:23) (106/65 - 150/80)  BP(mean): --  RR: 18 (03-30-22 @ 04:23) (18 - 18)  SpO2: 96% (03-30-22 @ 04:23) (96% - 99%)    Physical Exam:    Constitutional well preserved,comfortable,pleasant    HEENT PERRLA EOMI,No pallor or icterus    No oral exudate or erythema    Neck supple no JVD or LN    Chest Good AE,CTA    CVS S1 S2     Abd soft BS normal No tenderness     Ext No cyanosis clubbing or edema    IV site no erythema tenderness or discharge    Joints no swelling or LOM    CNS AAO X 3 no focal gait stable  no nystagmus no drift    Lab Data:                          12.0   4.50  )-----------( 223      ( 30 Mar 2022 08:25 )             36.7       03-30    142  |  107  |  10  ----------------------------<  127<H>  4.5   |  26  |  0.66    Ca    9.4      30 Mar 2022 08:25            .Blood Blood  03-27-22   No growth to date.  --  --      Clean Catch Clean Catch (Midstream)  03-27-22   No growth  --  --      Clean Catch Clean Catch (Midstream)  03-26-22   >100,000 CFU/ml Escherichia coli  --  Escherichia coli  Culture - Urine (03.26.22 @ 10:16)    -  Amikacin: S <=16    -  Amoxicillin/Clavulanic Acid: S <=8/4    -  Ampicillin: R >16 These ampicillin results predict results for amoxicillin    -  Ampicillin/Sulbactam: I 16/8 Enterobacter, Klebsiella aerogenes, Citrobacter, and Serratia may develop resistance during prolonged therapy (3-4 days)    -  Aztreonam: S <=4    -  Cefazolin: S <=2 ([id number]) For uncomplicated UTI with K. pneumoniae, E. coli, or P. mirablis: ARYAN <=16 is sensitive and ARYAN >=32 is resistant. This also predicts results for oral agents cefaclor, cefdinir, cefpodoxime, cefprozil, cefuroxime axetil, cephalexin and locarbef for uncomplicated UTI. Note that some isolates may be susceptible to these agents while testing resistant to cefazolin.    -  Cefepime: S <=2    -  Cefoxitin: S <=8    -  Ceftriaxone: S <=1 Enterobacter, Klebsiella aerogenes, Citrobacter, and Serratia may develop resistance during prolonged therapy    -  Ciprofloxacin: R >2    -  Ertapenem: S <=0.5    -  Gentamicin: S <=2    -  Imipenem: S <=1    -  Levofloxacin: R >4    -  Meropenem: S <=1    -  Nitrofurantoin: S <=32 Should not be used to treat pyelonephritis    -  Piperacillin/Tazobactam: S <=8    -  Tigecycline: S <=2    -  Tobramycin: S <=2    -  Trimethoprim/Sulfamethoxazole: R >2/38    Specimen Source: Clean Catch Clean Catch (Midstream)    Culture Results:   >100,000 CFU/ml Escherichia coli    Organism Identification: Escherichia coli    Organism: Escherichia coli    Method Type: ARYAN        < from: CT Abdomen and Pelvis w/ IV Cont (03.27.22 @ 09:53) >  ACC: [id number] EXAM:  CT ABDOMEN AND PELVIS IC                          PROCEDURE DATE:  03/27/2022          INTERPRETATION:  CLINICAL INFORMATION: Abdominal pain.    COMPARISON: CT abdomen and pelvis 8/5/2020    CONTRAST/COMPLICATIONS:  IV Contrast: Omnipaque 350  90 cc administered   10 cc discarded  Oral Contrast: NONE  Complications: None reported at time of study completion    PROCEDURE:  CT of the Abdomen and Pelvis was performed.  Sagittal and coronal reformats were performed.    FINDINGS:  LOWER CHEST: Coronary artery calcifications.    LIVER: Within normal limits.  BILE DUCTS: Normal caliber.  GALLBLADDER: Cholelithiasis.  SPLEEN: Within normal limits.  PANCREAS: Within normal limits.  ADRENALS: Unchanged right adrenal adenoma measuring 3.7 cm.  KIDNEYS/URETERS: Abnormally avid enhancement of both renal collecting   systems and focal areas of decreased attenuation in the right kidney   (striated nephrogram).  Bilateral renal cysts.    BLADDER: Within normal limits.  REPRODUCTIVEORGANS: Uterus within normal limits. Status post bilateral   oopherectomy.    BOWEL: Moderate hiatal hernia. Colonic diverticulosis without evidence of   diverticulitis. No bowel obstruction. Appendix is not visualized.  PERITONEUM: No ascites.  VESSELS: Atherosclerosis.  RETROPERITONEUM/LYMPH NODES: No lymphadenopathy.  ABDOMINAL WALL: Small fat-containing umbilical hernia. Fluid collections   within the abdominal wall are no longer visualized.  BONES: Degenerative changes.    IMPRESSION:  Findings suspect for right-sided pyelonephritis and bilateral pyelitis.    --- End of Report ---          PALLAVI CAMPO MD; Resident Radiologist  This document has been electronically signed.  DAQUAN VELA MD; Attending Radiologist  This document has been electronically signed. Mar 27 2022 11:19AM    < end of copied text >                WBC Count: 4.50 (03-30-22 @ 08:25)  WBC Count: 5.40 (03-29-22 @ 07:38)  WBC Count: 6.49 (03-28-22 @ 05:44)  WBC Count: 9.80 (03-27-22 @ 09:48)  WBC Count: 10.62 (03-26-22 @ 06:53)

## 2022-03-30 NOTE — ED POST DISCHARGE NOTE - RESULT SUMMARY
Ucx final> 100k e coli, DCd on keflex. appropriate care given. no need for callback at this time - Archie Cox PA-C

## 2022-03-31 PROCEDURE — 99233 SBSQ HOSP IP/OBS HIGH 50: CPT

## 2022-03-31 PROCEDURE — 71045 X-RAY EXAM CHEST 1 VIEW: CPT | Mod: 26

## 2022-03-31 RX ORDER — LANOLIN ALCOHOL/MO/W.PET/CERES
3 CREAM (GRAM) TOPICAL AT BEDTIME
Refills: 0 | Status: DISCONTINUED | OUTPATIENT
Start: 2022-03-31 | End: 2022-03-31

## 2022-03-31 RX ORDER — NORTRIPTYLINE HYDROCHLORIDE 10 MG/1
10 CAPSULE ORAL AT BEDTIME
Refills: 0 | Status: DISCONTINUED | OUTPATIENT
Start: 2022-03-31 | End: 2022-04-01

## 2022-03-31 RX ORDER — HEPARIN SODIUM 5000 [USP'U]/ML
5000 INJECTION INTRAVENOUS; SUBCUTANEOUS EVERY 8 HOURS
Refills: 0 | Status: DISCONTINUED | OUTPATIENT
Start: 2022-03-31 | End: 2022-04-01

## 2022-03-31 RX ORDER — ATORVASTATIN CALCIUM 80 MG/1
10 TABLET, FILM COATED ORAL AT BEDTIME
Refills: 0 | Status: DISCONTINUED | OUTPATIENT
Start: 2022-03-31 | End: 2022-04-01

## 2022-03-31 RX ADMIN — Medication 975 MILLIGRAM(S): at 04:07

## 2022-03-31 RX ADMIN — NORTRIPTYLINE HYDROCHLORIDE 10 MILLIGRAM(S): 10 CAPSULE ORAL at 21:47

## 2022-03-31 RX ADMIN — Medication 600 MILLIGRAM(S): at 09:47

## 2022-03-31 RX ADMIN — ATORVASTATIN CALCIUM 10 MILLIGRAM(S): 80 TABLET, FILM COATED ORAL at 21:46

## 2022-03-31 RX ADMIN — Medication 81 MILLIGRAM(S): at 12:17

## 2022-03-31 RX ADMIN — HEPARIN SODIUM 5000 UNIT(S): 5000 INJECTION INTRAVENOUS; SUBCUTANEOUS at 21:40

## 2022-03-31 RX ADMIN — Medication 975 MILLIGRAM(S): at 04:52

## 2022-03-31 RX ADMIN — Medication 600 MILLIGRAM(S): at 09:18

## 2022-03-31 RX ADMIN — Medication 20 MILLIGRAM(S): at 12:17

## 2022-03-31 RX ADMIN — CEFTRIAXONE 100 MILLIGRAM(S): 500 INJECTION, POWDER, FOR SOLUTION INTRAMUSCULAR; INTRAVENOUS at 10:27

## 2022-03-31 NOTE — PROGRESS NOTE ADULT - SUBJECTIVE AND OBJECTIVE BOX
Patient is a 67y old  Female who presents with a chief complaint of Fevers x 1 day (31 Mar 2022 16:22)    Being followed by ID for        Interval history:  No other acute events      ROS:  No cough,SOB,CP  No N/V/D  No abd pain  No urinary complaints  No HA  No joint or limb pain  No other complaints    PAST MEDICAL & SURGICAL HISTORY:  Arthritis    HLD (hyperlipidemia)    HTN (hypertension)    Status post bilateral oophorectomy      Allergies    lidocaine (Unknown)  penicillin (Anaphylaxis)    Intolerances      Antimicrobials:    cefTRIAXone   IVPB 1000 milliGRAM(s) IV Intermittent every 24 hours    MEDICATIONS  (STANDING):  aspirin  chewable 81 milliGRAM(s) Oral daily  atorvastatin 10 milliGRAM(s) Oral at bedtime  cefTRIAXone   IVPB 1000 milliGRAM(s) IV Intermittent every 24 hours  dicyclomine 20 milliGRAM(s) Oral daily  heparin   Injectable 5000 Unit(s) SubCutaneous every 8 hours  nortriptyline 10 milliGRAM(s) Oral at bedtime      Vital Signs Last 24 Hrs  T(C): 36.9 (03-31-22 @ 09:17), Max: 37.1 (03-31-22 @ 00:51)  T(F): 98.5 (03-31-22 @ 09:17), Max: 98.7 (03-31-22 @ 00:51)  HR: 86 (03-31-22 @ 09:17) (62 - 86)  BP: 130/86 (03-31-22 @ 09:17) (117/75 - 130/86)  BP(mean): --  RR: 18 (03-31-22 @ 09:17) (18 - 18)  SpO2: 97% (03-31-22 @ 09:17) (96% - 98%)    Physical Exam:    Constitutional well preserved,comfortable,pleasant    HEENT PERRLA EOMI,No pallor or icterus    No oral exudate or erythema    Neck supple no JVD or LN    Chest Good AE,CTA    CVS RRR S1 S2 WNl No murmur or rub or gallop    Abd soft BS normal No tenderness no masses    Ext No cyanosis clubbing or edema    IV site no erythema tenderness or discharge    Joints no swelling or LOM    CNS AAO X 3 no focal    Lab Data:                          12.0   4.50  )-----------( 223      ( 30 Mar 2022 08:25 )             36.7       03-30    142  |  107  |  10  ----------------------------<  127<H>  4.5   |  26  |  0.66    Ca    9.4      30 Mar 2022 08:25            .Blood Blood  03-27-22   No growth to date.  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 repeat test was negative and then the Humira was continued  Pt claims headache has resolved with BP management  No other acute events        PAST MEDICAL & SURGICAL HISTORY:  Arthritis    HLD (hyperlipidemia)    HTN (hypertension)    Status post bilateral oophorectomy      Allergies    lidocaine (Unknown)  penicillin (Anaphylaxis)    Intolerances      Antimicrobials:    cefTRIAXone   IVPB 1000 milliGRAM(s) IV Intermittent every 24 hours    MEDICATIONS  (STANDING):  aspirin  chewable 81 milliGRAM(s) Oral daily  atorvastatin 10 milliGRAM(s) Oral at bedtime  cefTRIAXone   IVPB 1000 milliGRAM(s) IV Intermittent every 24 hours  dicyclomine 20 milliGRAM(s) Oral daily  heparin   Injectable 5000 Unit(s) SubCutaneous every 8 hours  nortriptyline 10 milliGRAM(s) Oral at bedtime      Vital Signs Last 24 Hrs  T(C): 36.9 (03-31-22 @ 09:17), Max: 37.1 (03-31-22 @ 00:51)  T(F): 98.5 (03-31-22 @ 09:17), Max: 98.7 (03-31-22 @ 00:51)  HR: 86 (03-31-22 @ 09:17) (62 - 86)  BP: 130/86 (03-31-22 @ 09:17) (117/75 - 130/86)  BP(mean): --  RR: 18 (03-31-22 @ 09:17) (18 - 18)  SpO2: 97% (03-31-22 @ 09:17) (96% - 98%)    Physical Exam:    Constitutional well preserved,comfortable,pleasant    HEENT PERRLA EOMI,No pallor or icterus    No oral exudate or erythema    Neck supple no JVD or LN    Chest Good AE,CTA    CVS  S1 S2     Abd soft BS normal No tenderness no masses    Ext No cyanosis clubbing or edema    IV site no erythema tenderness or discharge    Joints no swelling or LOM    CNS AAO X 3 no focal    Lab Data:                          12.0   4.50  )-----------( 223      ( 30 Mar 2022 08:25 )             36.7       03-30    142  |  107  |  10  ----------------------------<  127<H>  4.5   |  26  |  0.66    Ca    9.4      30 Mar 2022 08:25      CXR- pending     < from: Xray Chest 1 View AP/PA (08.05.20 @ 05:59) >  INTERPRETATION:  CLINICAL INFORMATION: Sepsis    EXAM: Frontal chest radiograph dated 8/5/2020.    COMPARISON: Chest radiograph from 7/29/2020.    FINDINGS:  The lungs are clear.  There are no pleural effusions or pneumothorax.  The cardiomediastinal silhouette is within normal limits.  Visualized osseous structures are unremarkable.    IMPRESSION:  Clear lungs.    < end of copied text >        .Blood Blood  03-27-22   No growth to date.  --  --      Clean Catch Clean Catch (Midstream)  03-27-22   No growth  --  --      Clean Catch Clean Catch (Midstream)  03-26-22   >100,000 CFU/ml Escherichia coli  --  Escherichia coli                    WBC Count: 4.50 (03-30-22 @ 08:25)  WBC Count: 5.40 (03-29-22 @ 07:38)  WBC Count: 6.49 (03-28-22 @ 05:44)  WBC Count: 9.80 (03-27-22 @ 09:48)  WBC Count: 10.62 (03-26-22 @ 06:53)

## 2022-03-31 NOTE — PROGRESS NOTE ADULT - SUBJECTIVE AND OBJECTIVE BOX
Patient is a 67y old  Female who presents with a chief complaint of Fevers x 1 day (29 Mar 2022 12:11)      SUBJECTIVE / OVERNIGHT EVENTS: no events over night     T(C): 36.9 (03-31-22 @ 09:17), Max: 36.9 (03-31-22 @ 09:17)  HR: 86 (03-31-22 @ 09:17) (64 - 86)  BP: 130/86 (03-31-22 @ 09:17) (123/76 - 130/86)  RR: 18 (03-31-22 @ 09:17) (18 - 18)  SpO2: 97% (03-31-22 @ 09:17) (96% - 97%)      MEDICATIONS  (STANDING):  aspirin  chewable 81 milliGRAM(s) Oral daily  cefTRIAXone   IVPB 1000 milliGRAM(s) IV Intermittent every 24 hours  dicyclomine 20 milliGRAM(s) Oral daily    MEDICATIONS  (PRN):  acetaminophen     Tablet .. 975 milliGRAM(s) Oral every 6 hours PRN Temp greater or equal to 38C (100.4F), Mild Pain (1 - 3), Moderate Pain (4 - 6)  ibuprofen  Tablet. 600 milliGRAM(s) Oral every 6 hours PRN Temp greater or equal to 38C (100.4F), Mild Pain (1 - 3), Moderate Pain (4 - 6)                PHYSICAL EXAM:  GENERAL: NAD, well-developed  HEAD:  Atraumatic, Normocephalic  EYES: EOMI, PERRLA, conjunctiva and sclera clear  NECK: Supple, No JVD  CHEST/LUNG: Clear to auscultation bilaterally; No wheeze  HEART: Regular rate and rhythm; No murmurs, rubs, or gallops  ABDOMEN: Soft, Nontender, Nondistended; Bowel sounds present  EXTREMITIES:  2+ Peripheral Pulses, No clubbing, cyanosis, or edema  PSYCH: AAOx3  NEUROLOGY: non-focal  SKIN: No rashes or lesions                                                12.0   4.50  )-----------( 223      ( 30 Mar 2022 08:25 )             36.7       Quantiferon Positive     CAPILLARY BLOOD GLUCOSE            RADIOLOGY & ADDITIONAL TESTS:    Imaging Personally Reviewed:    Consultant(s) Notes Reviewed:      Care Discussed with Consultants/Other Providers:

## 2022-04-01 ENCOUNTER — TRANSCRIPTION ENCOUNTER (OUTPATIENT)
Age: 67
End: 2022-04-01

## 2022-04-01 VITALS
OXYGEN SATURATION: 98 % | TEMPERATURE: 98 F | SYSTOLIC BLOOD PRESSURE: 136 MMHG | DIASTOLIC BLOOD PRESSURE: 88 MMHG | RESPIRATION RATE: 18 BRPM | HEART RATE: 88 BPM

## 2022-04-01 LAB
A1C WITH ESTIMATED AVERAGE GLUCOSE RESULT: 5.9 % — HIGH (ref 4–5.6)
CRYPTOC AB SER-ACNC: NEGATIVE — SIGNIFICANT CHANGE UP
CULTURE RESULTS: SIGNIFICANT CHANGE UP
CULTURE RESULTS: SIGNIFICANT CHANGE UP
ESTIMATED AVERAGE GLUCOSE: 123 MG/DL — HIGH (ref 68–114)
SPECIMEN SOURCE: SIGNIFICANT CHANGE UP
SPECIMEN SOURCE: SIGNIFICANT CHANGE UP

## 2022-04-01 PROCEDURE — 82330 ASSAY OF CALCIUM: CPT

## 2022-04-01 PROCEDURE — 82435 ASSAY OF BLOOD CHLORIDE: CPT

## 2022-04-01 PROCEDURE — 84295 ASSAY OF SERUM SODIUM: CPT

## 2022-04-01 PROCEDURE — U0005: CPT

## 2022-04-01 PROCEDURE — 87086 URINE CULTURE/COLONY COUNT: CPT

## 2022-04-01 PROCEDURE — 96365 THER/PROPH/DIAG IV INF INIT: CPT

## 2022-04-01 PROCEDURE — 82962 GLUCOSE BLOOD TEST: CPT

## 2022-04-01 PROCEDURE — 74177 CT ABD & PELVIS W/CONTRAST: CPT | Mod: MA

## 2022-04-01 PROCEDURE — G0378: CPT

## 2022-04-01 PROCEDURE — 85025 COMPLETE CBC W/AUTO DIFF WBC: CPT

## 2022-04-01 PROCEDURE — 85018 HEMOGLOBIN: CPT

## 2022-04-01 PROCEDURE — 87040 BLOOD CULTURE FOR BACTERIA: CPT

## 2022-04-01 PROCEDURE — 86481 TB AG RESPONSE T-CELL SUSP: CPT

## 2022-04-01 PROCEDURE — 83605 ASSAY OF LACTIC ACID: CPT

## 2022-04-01 PROCEDURE — 96376 TX/PRO/DX INJ SAME DRUG ADON: CPT

## 2022-04-01 PROCEDURE — 83036 HEMOGLOBIN GLYCOSYLATED A1C: CPT

## 2022-04-01 PROCEDURE — U0003: CPT

## 2022-04-01 PROCEDURE — 99285 EMERGENCY DEPT VISIT HI MDM: CPT

## 2022-04-01 PROCEDURE — 81001 URINALYSIS AUTO W/SCOPE: CPT

## 2022-04-01 PROCEDURE — 80053 COMPREHEN METABOLIC PANEL: CPT

## 2022-04-01 PROCEDURE — 71250 CT THORAX DX C-: CPT | Mod: 26

## 2022-04-01 PROCEDURE — 85014 HEMATOCRIT: CPT

## 2022-04-01 PROCEDURE — 82803 BLOOD GASES ANY COMBINATION: CPT

## 2022-04-01 PROCEDURE — 86641 CRYPTOCOCCUS ANTIBODY: CPT

## 2022-04-01 PROCEDURE — 84132 ASSAY OF SERUM POTASSIUM: CPT

## 2022-04-01 PROCEDURE — 80048 BASIC METABOLIC PNL TOTAL CA: CPT

## 2022-04-01 PROCEDURE — 82947 ASSAY GLUCOSE BLOOD QUANT: CPT

## 2022-04-01 PROCEDURE — 96375 TX/PRO/DX INJ NEW DRUG ADDON: CPT

## 2022-04-01 PROCEDURE — 36415 COLL VENOUS BLD VENIPUNCTURE: CPT

## 2022-04-01 PROCEDURE — 85027 COMPLETE CBC AUTOMATED: CPT

## 2022-04-01 PROCEDURE — 71045 X-RAY EXAM CHEST 1 VIEW: CPT

## 2022-04-01 PROCEDURE — 71250 CT THORAX DX C-: CPT

## 2022-04-01 RX ORDER — METHOTREXATE 2.5 MG/1
5 TABLET ORAL
Qty: 0 | Refills: 0 | DISCHARGE

## 2022-04-01 RX ORDER — LOSARTAN POTASSIUM 100 MG/1
25 TABLET, FILM COATED ORAL DAILY
Refills: 0 | Status: DISCONTINUED | OUTPATIENT
Start: 2022-04-01 | End: 2022-04-01

## 2022-04-01 RX ORDER — METOPROLOL TARTRATE 50 MG
1 TABLET ORAL
Qty: 0 | Refills: 0 | DISCHARGE

## 2022-04-01 RX ORDER — CEFPODOXIME PROXETIL 100 MG
1 TABLET ORAL
Qty: 6 | Refills: 0
Start: 2022-04-01 | End: 2022-04-03

## 2022-04-01 RX ORDER — FLUCONAZOLE 150 MG/1
1 TABLET ORAL
Qty: 0 | Refills: 0 | DISCHARGE

## 2022-04-01 RX ORDER — LOSARTAN POTASSIUM 100 MG/1
1 TABLET, FILM COATED ORAL
Qty: 30 | Refills: 0
Start: 2022-04-01 | End: 2022-04-30

## 2022-04-01 RX ORDER — CEFPODOXIME PROXETIL 100 MG
200 TABLET ORAL EVERY 12 HOURS
Refills: 0 | Status: DISCONTINUED | OUTPATIENT
Start: 2022-04-01 | End: 2022-04-01

## 2022-04-01 RX ORDER — AMLODIPINE BESYLATE 2.5 MG/1
1 TABLET ORAL
Qty: 0 | Refills: 0 | DISCHARGE

## 2022-04-01 RX ADMIN — Medication 20 MILLIGRAM(S): at 11:59

## 2022-04-01 RX ADMIN — CEFTRIAXONE 100 MILLIGRAM(S): 500 INJECTION, POWDER, FOR SOLUTION INTRAMUSCULAR; INTRAVENOUS at 10:29

## 2022-04-01 RX ADMIN — Medication 81 MILLIGRAM(S): at 11:58

## 2022-04-01 RX ADMIN — HEPARIN SODIUM 5000 UNIT(S): 5000 INJECTION INTRAVENOUS; SUBCUTANEOUS at 05:20

## 2022-04-01 RX ADMIN — LOSARTAN POTASSIUM 25 MILLIGRAM(S): 100 TABLET, FILM COATED ORAL at 15:40

## 2022-04-01 RX ADMIN — HEPARIN SODIUM 5000 UNIT(S): 5000 INJECTION INTRAVENOUS; SUBCUTANEOUS at 15:39

## 2022-04-01 NOTE — DISCHARGE NOTE PROVIDER - CARE PROVIDER_API CALL
Collette Pike)  Infectious Disease; Internal Medicine  [address] [place] [address]  Phone: [phone]  Fax: [phone]  Follow Up Time:

## 2022-04-01 NOTE — PROGRESS NOTE ADULT - ASSESSMENT
66yo F PMH HTN, HLD, RA on Humira, bl ovarian cystectomy presents with abd pain, fever tmax 39.4 ongoing for the past 4 days. Went to OB at onset of sxs, given fluconazole at that time without relief. Came to the ED yesterday with the same symptoms. Dx with UTI and dc on Keflex. Pt states symptoms have not improved despite abx and tylenol. States pain currently 5/10 intensity. Last took Tylenol 7am this mornin    # Acute Pyelonephrits- iv abx Ceftriaxone given active infection, immunosuppression.   urine cultures.E.coli       Headaches-Crytpto Antigen  / abs as per ID   Quantiferon TB posiive   Cxr neg    ?INH as per ID        # HTN Hold BP meds     # DM HISS follow up A1c    # RA Hold Methotrexate   
68yo F PMH HTN, HLD, RA on Humira, bl ovarian cystectomy presents with abd pain, fever tmax 39.4 ongoing for the past 4 days. Went to OB at onset of sxs, given fluconazole at that time without relief. Came to the ED yesterday with the same symptoms. Dx with UTI and dc on Keflex. Pt states symptoms have not improved despite abx and tylenol. States pain currently 5/10 intensity. Last took Tylenol 7am this mornin    # Acute Pyelonephrits- iv abx Ceftriaxone given active infection, immunosuppression.   urine cultures.E.coli       # Headaches-Crytpto Antigen  / abs as per ID       #Positive TB screening test Quantiferon TB postivie   Cxr neg  CT chest  Patient with known positive Gold quantiferon test not treated     ?INH as per ID        # HTN Hold BP meds     # DM HISS follow up A1c    # RA Hold Methotrexate   
68yo F PMH HTN, HLD, RA on Humira, bl ovarian cystectomy presents with abd pain, fever tmax 39.4 ongoing for the past 4 days. Went to OB at onset of sxs, given fluconazole at that time without relief. Came to the ED yesterday with the same symptoms. Dx with UTI and dc on Keflex. Pt states symptoms have not improved despite abx and tylenol. States pain currently 5/10 intensity. Last took Tylenol 7am this mornin    # Acute Pyelonephrits- iv abx Ceftriaxone given active infection, immunosuppression.   urine cultures.no growth   -    Headaches-Crytpto Antigen  / abs as per ID     # HTN Hold BP meds     # DM HISS follow up A1c    # RA Hold Methotrexate   
68yo F PMH HTN, HLD, RA on Humira, bl ovarian cystectomy presents with abd pain, fever tmax 39.4 ongoing for the past 4 days. Went to OB at onset of sxs, given fluconazole at that time without relief. Came to the ED yesterday with the same symptoms. Dx with UTI and dc on Keflex. Pt states symptoms have not improved despite abx and tylenol. States pain currently 5/10 intensity. Last took Tylenol 7am this mornin    # Acute Pyelonephrits- iv abx Ceftriaxone given active infection, immunosuppression.   urine cultures.E.coli       Headaches-Crytpto Antigen  / abs as per ID     # HTN Hold BP meds     # DM HISS follow up A1c    # RA Hold Methotrexate   
66 yo F PMH HTN, HLD, RA on Humira, ovarian cystectomy presents with abd pain, fever tmax 39.4 ongoing for the past 4 days. Patient went to OB at onset of sxs, given fluconazole at that time without relief.     Patient came to the ED yesterday with the same symptoms. Dx with UTI and dc on Keflex. Pt states symptoms have not improved despite abx and tylenol. States pain currently 5/10 intensity. Last took Tylenol 7am this morning (28 Mar 2022 16:05)  Pt noted some dysuria when went to OB . Pt denies urgency or frequency. Pt denies cough or GI sxs  Pt is  concerned for ongoing fevers. Pt had a CT with concern for pyelonephritis . Pt was started on Ceftriaxone. Pt notes that when she was a child in [facility] she had an allergic reaction to some antibiotic that led to throat feels like its closing. Not sure what antibiotic. ID called as patient remains febrile. Urine is now growing an E Coli pt notes some LLQ pain. Pt denies diarrhea. Pt denies respiratory sxs    Pt has this vague history of anaphylaxis to an antibiotic in her youth-Im concerned it could have been penicillin but truly unclear    A/P  check BC x 2 sets- pending  agree with Rocephin, sensitivities noted  urology input  suggestrenal ultrasound  pt denies recent abs- could she have an ESBL organism  I'm a little shy about giving penicillin with her anaphylaxis history- perhaps allergy can skin test her  If temps go up significantly or change in status , can try meropenem   if fevers persist, then check CXR as well.  Pt with RA on Humira  Please check baseline QuantiFeron   Pt fully vaxed and boosted for Covid PCR negative for Covid and neg for flu   Pt anemic- ? from hydration  monitor   pt with headache , no meningismus.  check crypt ag . await quant Pt is on humira so this may make the patient more susceptible t oatypical pathogens    Collette Pike M.D. ,   please reach via teams   If no answer, or after 5PM/ weekends,  then please call  [phone]      Assessment and plan discussed with the Dr Joseph  
66 yo F PMH HTN, HLD, RA on Humira, ovarian cystectomy presents with abd pain, fever tmax 39.4 ongoing for the past 4 days. Patient went to OB at onset of sxs, given fluconazole at that time without relief.     Patient came to the ED yesterday with the same symptoms. Dx with UTI and dc on Keflex. Pt states symptoms have not improved despite abx and tylenol. States pain currently 5/10 intensity. Last took Tylenol 7am this morning (28 Mar 2022 16:05)  Pt noted some dysuria when went to OB . Pt denies urgency or frequency. Pt denies cough or GI sxs  Pt is  concerned for ongoing fevers. Pt had a CT with concern for pyelonephritis . Pt was started on Ceftriaxone. Pt notes that when she was a child in [facility] she had an allergic reaction to some antibiotic that led to throat feels like its closing. Not sure what antibiotic. ID called as patient remains febrile. Urine is now growing an E Coli pt notes some LLQ pain. Pt denies diarrhea. Pt denies respiratory sxs    Pt has this vague history of anaphylaxis to an antibiotic in her youth-Im concerned it could have been penicillin but truly unclear    A/P  check BC x 2 sets- pending  agree with Rocephin, sensitivities noted- may be able to change to po soon  urology input  suggest renal ultrasound      if fevers persist, then check CXR as well.  Pt with RA on Humira  Please check baseline QuantiFeron   Pt fully vaxed and boosted for Covid PCR negative for Covid and neg for flu     pt with headache , no meningismus.  check crypt ag . await quant Pt is on humira so this may make the patient more susceptible to atypical pathogens  Headache is better. feels a little dizzy when walks but has been in bed will walk when  comes.   will need to investigate headache and dizziness further if persists. check orthostatics may be dehydrated     Collette Pike M.D. ,   please reach via teams   If no answer, or after 5PM/ weekends,  then please call  [phone]          
68 yo F PMH HTN, HLD, RA on Humira, ovarian cystectomy presents with abd pain, fever tmax 39.4 ongoing for the past 4 days. Patient went to OB at onset of sxs, given fluconazole at that time without relief.     Patient came to the ED yesterday with the same symptoms. Dx with UTI and dc on Keflex. Pt states symptoms have not improved despite abx and tylenol. States pain currently 5/10 intensity. Last took Tylenol 7am this morning (28 Mar 2022 16:05)  Pt noted some dysuria when went to OB . Pt denies urgency or frequency. Pt denies cough or GI sxs  Pt is  concerned for ongoing fevers. Pt had a CT with concern for pyelonephritis . Pt was started on Ceftriaxone. Pt notes that when she was a child in [facility] she had an allergic reaction to some antibiotic that led to throat feels like its closing. Not sure what antibiotic. ID called as patient remains febrile. Urine is now growing an E Coli pt notes some LLQ pain. Pt denies diarrhea. Pt denies respiratory sxs    Pt has this vague history of anaphylaxis to an antibiotic in her youth-Im concerned it could have been penicillin but truly unclear    A/P  check BC x 2 sets- negative   agree with Rocephin, sensitivities noted- can cahnge to po ceftin to complete the course   urology input  suggest renal ultrasound      i  Pt with RA on Humira   QuantiFeron positive  await official CXR reading  denies pulm sxs  HA resolved  check CT of chest   Pt fully vaxed and boosted for Covid PCR negative for Covid and neg for flu     Headache is much improved , resolved     Collette Pike M.D. ,   please reach via teams   If no answer, or after 5PM/ weekends,  then please call  [phone]    Assessment and plan discussed with Dr Joseph    I will be away as of tomorrow. My associates will be covering. Please call [phone] with acute issues, questions.

## 2022-04-01 NOTE — PROGRESS NOTE ADULT - SUBJECTIVE AND OBJECTIVE BOX
Patient is a 67y old  Female who presents with a chief complaint of Fevers x 1 day (29 Mar 2022 12:11)      SUBJECTIVE / OVERNIGHT EVENTS: no events over night     T(C): 36.6 (04-01-22 @ 10:43), Max: 37 (04-01-22 @ 04:32)  HR: 87 (04-01-22 @ 10:43) (71 - 87)  BP: 131/82 (04-01-22 @ 10:43) (113/72 - 131/82)  RR: 18 (04-01-22 @ 10:43) (18 - 18)  SpO2: 98% (04-01-22 @ 10:43) (97% - 98%)      MEDICATIONS  (STANDING):  aspirin  chewable 81 milliGRAM(s) Oral daily  atorvastatin 10 milliGRAM(s) Oral at bedtime  cefTRIAXone   IVPB 1000 milliGRAM(s) IV Intermittent every 24 hours  dicyclomine 20 milliGRAM(s) Oral daily  heparin   Injectable 5000 Unit(s) SubCutaneous every 8 hours  losartan 25 milliGRAM(s) Oral daily  nortriptyline 10 milliGRAM(s) Oral at bedtime    MEDICATIONS  (PRN):  acetaminophen     Tablet .. 975 milliGRAM(s) Oral every 6 hours PRN Temp greater or equal to 38C (100.4F), Mild Pain (1 - 3), Moderate Pain (4 - 6)  ibuprofen  Tablet. 600 milliGRAM(s) Oral every 6 hours PRN Temp greater or equal to 38C (100.4F), Mild Pain (1 - 3), Moderate Pain (4 - 6)                PHYSICAL EXAM:  GENERAL: NAD, well-developed  HEAD:  Atraumatic, Normocephalic  EYES: EOMI, PERRLA, conjunctiva and sclera clear  NECK: Supple, No JVD  CHEST/LUNG: Clear to auscultation bilaterally; No wheeze  HEART: Regular rate and rhythm; No murmurs, rubs, or gallops  ABDOMEN: Soft, Nontender, Nondistended; Bowel sounds present  EXTREMITIES:  2+ Peripheral Pulses, No clubbing, cyanosis, or edema  PSYCH: AAOx3  NEUROLOGY: non-focal  SKIN: No rashes or lesions                                             no new labs         RADIOLOGY & ADDITIONAL TESTS:    Imaging Personally Reviewed:    Consultant(s) Notes Reviewed:      Care Discussed with Consultants/Other Providers:

## 2022-04-01 NOTE — PROGRESS NOTE ADULT - PROVIDER SPECIALTY LIST ADULT
Hospitalist
Infectious Disease
Hospitalist

## 2022-04-01 NOTE — DISCHARGE NOTE PROVIDER - NSDCCPCAREPLAN_GEN_ALL_CORE_FT
PRINCIPAL DISCHARGE DIAGNOSIS  Diagnosis: Pyelonephritis  Assessment and Plan of Treatment: s/p IV antibiotics ---continue Vantin x 3 more days   Follow-up with your pcp next week      SECONDARY DISCHARGE DIAGNOSES  Diagnosis: Pre-diabetes  Assessment and Plan of Treatment: HgA1C this admission 5.9  -Low carbohydrate diet   -life style modification   Make sure you get your HgA1c checked every three months.  If you take oral diabetes medications, check your blood glucose two   Keep a list of your current medications including injectables and over the counter medications and bring this medication list with you to all your doctor appointments.  If you have not seen your ophthalmologist this year call for appointment.      Diagnosis: HTN (hypertension)  Assessment and Plan of Treatment: Low salt diet  Activity as tolerated.  Take all medication as prescribed.  Follow up with your medical doctor for routine blood pressure monitoring at your next visit.  Notify your doctor if you have any of the following symptoms:   Dizziness, Lightheadedness, Blurry vision, Headache, Chest pain, Shortness of breath     PRINCIPAL DISCHARGE DIAGNOSIS  Diagnosis: Pyelonephritis  Assessment and Plan of Treatment: s/p IV antibiotics ---continue Vantin x 3 more days   Follow-up with your pcp next week      SECONDARY DISCHARGE DIAGNOSES  Diagnosis: Pre-diabetes  Assessment and Plan of Treatment: HgA1C this admission 5.9  -Low carbohydrate diet   -life style modification   Make sure you get your HgA1c checked every three months.  If you take oral diabetes medications, check your blood glucose two   Keep a list of your current medications including injectables and over the counter medications and bring this medication list with you to all your doctor appointments.  If you have not seen your ophthalmologist this year call for appointment.      Diagnosis: HTN (hypertension)  Assessment and Plan of Treatment: Low salt diet  Activity as tolerated.  Take all medication as prescribed.  Follow up with your medical doctor for routine blood pressure monitoring at your next visit.  Notify your doctor if you have any of the following symptoms:   Dizziness, Lightheadedness, Blurry vision, Headache, Chest pain, Shortness of breath    Diagnosis: TB lung, latent  Assessment and Plan of Treatment: CT scan results:  Central airways are patent. A 4 mm left upper lobe   nodule with eccentric calcification (3:30). Otherwise, remainder of the   lungs are clear. No pleural effusions or pneumothorax.  -Follow-up with your PCP in 2-3 days   Follow-up with Collette Pike) in 2 weeks for further monitoring   Infectious Disease; Internal Medicine  [address] [place] [address]  Phone: [phone]  Fax: [phone]

## 2022-04-01 NOTE — DISCHARGE NOTE PROVIDER - HOSPITAL COURSE
68yo F PMH HTN, HLD, RA on Humira, bl ovarian cystectomy presents with abd pain, fever tmax 39.4 ongoing for the past 4 days. Went to OB at onset of sxs, given fluconazole at that time without relief. Came to the ED yesterday with the same symptoms. Dx with UTI and dc on Keflex. Pt states symptoms have not improved despite abx and tylenol. States pain currently 5/10 intensity. Last took Tylenol 7am this mornin  # Acute Pyelonephrits- iv abx Ceftriaxone given active infection, immunosuppression.   urine cultures.E.coli   Headaches-Crytpto Antigen  / abs as per ID   Quantiferon TB posiive   Cxr neg   68yo F PMH HTN, HLD, RA on Humira, bl ovarian cystectomy presents with abd pain, fever tmax 39.4 ongoing for the past 4 days. Went to OB at onset of sxs, given fluconazole at that time without relief. Came to the ED yesterday with the same symptoms. Dx with UTI and dc on Keflex. Pt states symptoms have not improved despite abx and tylenol. States pain currently 5/10 intensity. Last took Tylenol 7am this mornin  # Acute Pyelonephrits- iv abx Ceftriaxone given active infection, immunosuppression.   urine cultures.E.coli   Headaches-Crytpto Antigen  / abs as per ID   Quantiferon TB posiive   Cxr neg    AIRWAYS/LUNGS/PLEURA: Central airways are patent. A 4 mm left upper lobe   nodule with eccentric calcification (3:30). Otherwise, remainder of the   lungs are clear. No pleural effusions or pneumothorax.    UPPER ABDOMEN: Cholelithiasis. A 3.4 cm right adrenal adenoma. Moderate   size hiatal hernia.    BONES/SOFT TISSUES: Degenerative changes.    IMPRESSION:    No pneumonia.    Dr. Joseph has medically cleared patient for discharge home with follow-up as advised. Radiology reported reviewed  and medication  reconciliation  revised and resolved with Dr. joseph.   Patient advised to follow-up with Dr. Pike and her PCP for further monitoring.

## 2022-04-01 NOTE — DISCHARGE NOTE PROVIDER - NSDCMRMEDTOKEN_GEN_ALL_CORE_FT
acetaminophen 325 mg oral tablet: 3 tab(s) orally every 6 hours, As needed, Mild Pain (1 - 3)  amLODIPine 5 mg oral tablet: 1 tab(s) orally once a day  aspirin 81 mg oral delayed release tablet: 1 tab(s) orally once a day  atorvastatin 10 mg oral tablet: 1 tab(s) orally once a day  cephalexin 500 mg oral tablet: 1 tab(s) orally 2 times a day (had taken 2 doses prior to readmission)  dicyclomine 20 mg oral tablet: 1 tab(s) orally once a day, As Needed  fluconazole 150 mg oral tablet: 1 tab(s) orally once x1 dose (3/23)  folic acid 1 mg oral tablet: 1 tab(s) orally once a day  Humira Pen 40 mg/0.4 mL subcutaneous kit: 2 dose(s) subcutaneous once a month  metFORMIN 500 mg oral tablet: 1 tab(s) orally 2 times a day (with meals)  methotrexate 2.5 mg oral tablet: 5 tab(s) orally once a week (Tuesdays)  metoprolol succinate 25 mg oral tablet, extended release: 1 tab(s) orally once a day  nortriptyline 10 mg oral capsule: 1 cap(s) orally once a day (at bedtime)  omeprazole 40 mg oral delayed release capsule: 1 cap(s) orally once a day  Tymlos 3120 mcg/1.56 mL subcutaneous solution: 80 microgram(s) subcutaneous once a day (osteoporosis) - pt skips dose on humira and methotrexate days  valsartan-hydrochlorothiazide 160 mg-12.5 mg oral tablet: 1 tab(s) orally once a day  Vitamin D2 1.25 mg (50,000 intl units) oral capsule: 1 cap(s) orally once a week  vitamins: Vitamin B12, Vitamin B2, Vitamin C, Calcium, fish oil   acetaminophen 325 mg oral tablet: 3 tab(s) orally every 6 hours, As needed, Mild Pain (1 - 3)  aspirin 81 mg oral delayed release tablet: 1 tab(s) orally once a day  atorvastatin 10 mg oral tablet: 1 tab(s) orally once a day  dicyclomine 20 mg oral tablet: 1 tab(s) orally once a day, As Needed  folic acid 1 mg oral tablet: 1 tab(s) orally once a day  Humira Pen 40 mg/0.4 mL subcutaneous kit: 2 dose(s) subcutaneous once a month  losartan 25 mg oral tablet: 1 tab(s) orally once a day  metFORMIN 500 mg oral tablet: 1 tab(s) orally 2 times a day (with meals)  nortriptyline 10 mg oral capsule: 1 cap(s) orally once a day (at bedtime)  omeprazole 40 mg oral delayed release capsule: 1 cap(s) orally once a day  Tymlos 3120 mcg/1.56 mL subcutaneous solution: 80 microgram(s) subcutaneous once a day (osteoporosis) - pt skips dose on humira and methotrexate days  Vitamin D2 1.25 mg (50,000 intl units) oral capsule: 1 cap(s) orally once a week  vitamins: Vitamin B12, Vitamin B2, Vitamin C, Calcium, fish oil   acetaminophen 325 mg oral tablet: 3 tab(s) orally every 6 hours, As needed, Mild Pain (1 - 3)  aspirin 81 mg oral delayed release tablet: 1 tab(s) orally once a day  atorvastatin 10 mg oral tablet: 1 tab(s) orally once a day  cefpodoxime 200 mg oral tablet: 1 tab(s) orally every 12 hours  dicyclomine 20 mg oral tablet: 1 tab(s) orally once a day, As Needed  folic acid 1 mg oral tablet: 1 tab(s) orally once a day  Humira Pen 40 mg/0.4 mL subcutaneous kit: 2 dose(s) subcutaneous once a month  losartan 25 mg oral tablet: 1 tab(s) orally once a day  metFORMIN 500 mg oral tablet: 1 tab(s) orally 2 times a day (with meals)  nortriptyline 10 mg oral capsule: 1 cap(s) orally once a day (at bedtime)  omeprazole 40 mg oral delayed release capsule: 1 cap(s) orally once a day  Tymlos 3120 mcg/1.56 mL subcutaneous solution: 80 microgram(s) subcutaneous once a day (osteoporosis) - pt skips dose on humira and methotrexate days  Vitamin D2 1.25 mg (50,000 intl units) oral capsule: 1 cap(s) orally once a week  vitamins: Vitamin B12, Vitamin B2, Vitamin C, Calcium, fish oil

## 2022-04-01 NOTE — PROGRESS NOTE ADULT - REASON FOR ADMISSION
Fevers x 1 day

## 2022-04-01 NOTE — DISCHARGE NOTE NURSING/CASE MANAGEMENT/SOCIAL WORK - PATIENT PORTAL LINK FT
You can access the FollowMyHealth Patient Portal offered by [facility] by registering at the following website: [email or web address]. By joining [employer]’s FollowMyHealth portal, you will also be able to view your health information using other applications (apps) compatible with our system.

## 2022-04-01 NOTE — DISCHARGE NOTE NURSING/CASE MANAGEMENT/SOCIAL WORK - NSDCPEFALRISK_GEN_ALL_CORE
For information on Fall & Injury Prevention, visit: [email or web address] OR  [email or web address] OR  https://www.cdc.gov/steadi/patient.html

## 2022-04-05 PROBLEM — Z00.00 ENCOUNTER FOR PREVENTIVE HEALTH EXAMINATION: Status: ACTIVE | Noted: 2022-04-05

## 2022-04-21 ENCOUNTER — APPOINTMENT (OUTPATIENT)
Dept: INFECTIOUS DISEASE | Facility: CLINIC | Age: 67
End: 2022-04-21
Payer: COMMERCIAL

## 2022-04-21 ENCOUNTER — TRANSCRIPTION ENCOUNTER (OUTPATIENT)
Age: 67
End: 2022-04-21

## 2022-04-21 VITALS
OXYGEN SATURATION: 98 % | TEMPERATURE: 97.8 F | HEART RATE: 83 BPM | HEIGHT: 64 IN | BODY MASS INDEX: 34.83 KG/M2 | WEIGHT: 204 LBS | SYSTOLIC BLOOD PRESSURE: 123 MMHG | RESPIRATION RATE: 16 BRPM | DIASTOLIC BLOOD PRESSURE: 78 MMHG

## 2022-04-21 DIAGNOSIS — Z83.1 FAMILY HISTORY OF OTHER INFECTIOUS AND PARASITIC DISEASES: ICD-10-CM

## 2022-04-21 DIAGNOSIS — Z87.891 PERSONAL HISTORY OF NICOTINE DEPENDENCE: ICD-10-CM

## 2022-04-21 DIAGNOSIS — Z83.3 FAMILY HISTORY OF DIABETES MELLITUS: ICD-10-CM

## 2022-04-21 DIAGNOSIS — I10 ESSENTIAL (PRIMARY) HYPERTENSION: ICD-10-CM

## 2022-04-21 DIAGNOSIS — Z86.39 PERSONAL HISTORY OF OTHER ENDOCRINE, NUTRITIONAL AND METABOLIC DISEASE: ICD-10-CM

## 2022-04-21 DIAGNOSIS — N39.0 URINARY TRACT INFECTION, SITE NOT SPECIFIED: ICD-10-CM

## 2022-04-21 PROCEDURE — 99215 OFFICE O/P EST HI 40 MIN: CPT

## 2022-04-21 RX ORDER — ASPIRIN 81 MG/1
81 TABLET, CHEWABLE ORAL
Refills: 0 | Status: ACTIVE | COMMUNITY

## 2022-04-21 RX ORDER — ABALOPARATIDE 2000 UG/ML
3120 INJECTION, SOLUTION SUBCUTANEOUS
Refills: 0 | Status: ACTIVE | COMMUNITY

## 2022-04-21 RX ORDER — PSYLLIUM HUSK 0.4 G
CAPSULE ORAL
Refills: 0 | Status: ACTIVE | COMMUNITY

## 2022-04-21 RX ORDER — LOSARTAN POTASSIUM 25 MG/1
25 TABLET, FILM COATED ORAL
Refills: 0 | Status: ACTIVE | COMMUNITY

## 2022-04-21 RX ORDER — OMEPRAZOLE 40 MG/1
40 CAPSULE, DELAYED RELEASE ORAL
Refills: 0 | Status: ACTIVE | COMMUNITY

## 2022-04-21 RX ORDER — PNV NO.95/FERROUS FUM/FOLIC AC 28MG-0.8MG
TABLET ORAL
Refills: 0 | Status: ACTIVE | COMMUNITY

## 2022-04-21 RX ORDER — DICYCLOMINE HYDROCHLORIDE 20 MG/1
20 TABLET ORAL
Refills: 0 | Status: ACTIVE | COMMUNITY

## 2022-04-21 RX ORDER — FOLIC ACID 1 MG/1
1 TABLET ORAL
Refills: 0 | Status: ACTIVE | COMMUNITY

## 2022-04-21 RX ORDER — METFORMIN HYDROCHLORIDE 500 MG/1
500 TABLET, COATED ORAL
Refills: 0 | Status: ACTIVE | COMMUNITY

## 2022-04-21 RX ORDER — METHOTREXATE 2.5 MG/1
TABLET ORAL
Refills: 0 | Status: ACTIVE | COMMUNITY

## 2022-04-21 RX ORDER — NORTRIPTYLINE HYDROCHLORIDE 10 MG/1
10 CAPSULE ORAL
Refills: 0 | Status: ACTIVE | COMMUNITY

## 2022-04-21 RX ORDER — METFORMIN HYDROCHLORIDE 625 MG/1
TABLET ORAL
Refills: 0 | Status: ACTIVE | COMMUNITY

## 2022-04-21 RX ORDER — ASCORBIC ACID 500 MG
TABLET ORAL
Refills: 0 | Status: ACTIVE | COMMUNITY

## 2022-04-21 RX ORDER — ATORVASTATIN CALCIUM 10 MG/1
10 TABLET, FILM COATED ORAL
Refills: 0 | Status: ACTIVE | COMMUNITY

## 2022-04-21 RX ORDER — ADALIMUMAB 40MG/0.4ML
40 KIT SUBCUTANEOUS
Refills: 0 | Status: ACTIVE | COMMUNITY

## 2022-04-21 RX ORDER — UBIDECARENONE/VIT E ACET 100MG-5
1000 CAPSULE ORAL
Refills: 0 | Status: ACTIVE | COMMUNITY

## 2022-04-22 ENCOUNTER — NON-APPOINTMENT (OUTPATIENT)
Age: 67
End: 2022-04-22

## 2022-04-27 NOTE — REVIEW OF SYSTEMS
[FreeTextEntry2] : weight stable  [FreeTextEntry4] : headaches - low BP  in hospital but  now better  [FreeTextEntry6] : denies cough, SOB , chest pain

## 2022-04-27 NOTE — PHYSICAL EXAM
[General Appearance - Alert] : alert [General Appearance - In No Acute Distress] : in no acute distress [Sclera] : the sclera and conjunctiva were normal [PERRL With Normal Accommodation] : pupils were equal in size, round, reactive to light [Extraocular Movements] : extraocular movements were intact [Neck Appearance] : the appearance of the neck was normal [Neck Cervical Mass (___cm)] : no neck mass was observed [Jugular Venous Distention Increased] : there was no jugular-venous distention [Thyroid Diffuse Enlargement] : the thyroid was not enlarged [Auscultation Breath Sounds / Voice Sounds] : lungs were clear to auscultation bilaterally [Heart Rate And Rhythm] : heart rate was normal and rhythm regular [Heart Sounds] : normal S1 and S2 [Heart Sounds Gallop] : no gallops [Murmurs] : no murmurs [Heart Sounds Pericardial Friction Rub] : no pericardial rub [Full Pulse] : the pedal pulses are present [Edema] : there was no peripheral edema [Bowel Sounds] : normal bowel sounds [Abdomen Soft] : soft [Abdomen Tenderness] : non-tender [Abdomen Mass (___ Cm)] : no abdominal mass palpated [Costovertebral Angle Tenderness] : no CVA tenderness [No Palpable Adenopathy] : no palpable adenopathy [Musculoskeletal - Swelling] : no joint swelling [Motor Tone] : muscle strength and tone were normal [Nail Clubbing] : no clubbing  or cyanosis of the fingernails [Skin Color & Pigmentation] : normal skin color and pigmentation [] : no rash [Deep Tendon Reflexes (DTR)] : deep tendon reflexes were 2+ and symmetric [Sensation] : the sensory exam was normal to light touch and pinprick [No Focal Deficits] : no focal deficits [Oriented To Time, Place, And Person] : oriented to person, place, and time [Affect] : the affect was normal [FreeTextEntry1] : no flank pain

## 2022-04-27 NOTE — REASON FOR VISIT
[Post Hospitalization] : a post hospitalization visit [FreeTextEntry1] : positive QuantiFERON-TB    [place] INTERPRETOR USED FOR VISIT

## 2022-04-27 NOTE — HISTORY OF PRESENT ILLNESS
[FreeTextEntry1] :  Hospital Course:\par Discharge Date	01-Apr-2022\par Admission Date	28-Mar-2022 11:23\par Reason for Admission	Fevers x 1 day\par Hospital Course	\par 68yo F PMH HTN, HLD, RA on Humira, bl ovarian cystectomy presents with abd pain, fever tmax 39.4 ongoing for the past 4 days. Went to OB at onset of sxs, given fluconazole at that time without relief. Came to the ED yesterday with the same symptoms. Dx with UTI and dc on Keflex. Pt states symptoms have not improved despite abx and tylenol. States pain currently 5/10 intensity. Last took Tylenol 7am this mornin\par # Acute Pyelonephrits- iv abx Ceftriaxone given active infection, immunosuppression. \par urine cultures.E.coli \par Headaches-Crytpto Antigen  / abs as per ID \par Quantiferon TB posiive \par Cxr neg\par \par AIRWAYS/LUNGS/PLEURA: Central airways are patent. A 4 mm left upper lobe \par nodule with eccentric calcification (3:30). Otherwise, remainder of the \par lungs are clear. No pleural effusions or pneumothorax.\par \par UPPER ABDOMEN: Cholelithiasis. A 3.4 cm right adrenal adenoma. Moderate \par size hiatal hernia.\par \par BONES/SOFT TISSUES: Degenerative changes.\par \par IMPRESSION:\par \par No pneumonia.\par \par Dr. Joseph has medically cleared patient for discharge home with follow-up as advised. Radiology reported reviewed  and medication  reconciliation  revised and resolved with Dr. joseph. \par Patient advised to follow-up with Dr. Pike and her PCP for further monitoring. \par \par \par \par ESTRELLITA positive 1:640 \par \par Dr Roro Bonds  [phone]\par urinary sxs are improved \par \par no cough\par no fevers\par no night sweats \par \par joints pains- ankles and hip\par

## 2022-04-27 NOTE — ASSESSMENT
[FreeTextEntry1] : 68 yo F PMH HTN, HLD, RA on Humira, ovarian cystectomy presents with abd pain, fever tmax 39.4 ongoing for the past 4 days. Patient went to OB at onset of sxs, given fluconazole at that time without relief. \par Patient came to the ED  with the same symptoms. Dx with UTI and dc on Keflex. Pt states symptoms have not improved despite abx and tylenol. States pain currently 5/10 intensity. Last took Tylenol 7am this morning (28 Mar 2022 16:05)\par Pt noted some dysuria when went to OB . Pt denies urgency or frequency. Pt denies cough or GI sxs\par Pt is  concerned for ongoing fevers. Pt had a CT with concern for pyelonephritis . Pt was started on Ceftriaxone. Pt notes that when she was a child in [facility] she had an allergic reaction to some antibiotic that led to throat feels like its closing. Not sure what antibiotic. ID called as patient remains febrile. Urine is now growing an E Coli pt notes some LLQ pain. Pt denies diarrhea. Pt denies respiratory sxs\par \par Pt has this vague history of anaphylaxis to an antibiotic in her youth-Im concerned it could have been penicillin but truly unclear\par \par A/P\par #Pyelonephritis\par BC x 2 sets- negative \par treated with Rocephin, sensitivities noted-changed to po cephalosporin to complete course.\par   sxs resolved \par CT with Findings suspect for right-sided pyelonephritis and bilateral pyelitis.\par suggest urology evaluation\par \par \par \par #RA\par Pt with RA on Humira\par  QuantiFeron positive\par denies pulm sxs\par  CT of chest  with calcified nodule  \par no signs of active MTb\par will need suppression for Latent Tb\par will consider po rifampin- will need to check with her rheumatologist and will check with pharmD for drug interactions\par pt doesn't want to take a 9 month course of INH\par \par #Health Care Maintenance\par Pt fully vaxed and boosted for Covid PCR negative for Covid and neg for flu \par \par #HEADACHE\par Headache is much improved , resolved \par Pt feels that she was dehydrated in hospital and feels  much better since

## 2022-05-09 ENCOUNTER — NON-APPOINTMENT (OUTPATIENT)
Age: 67
End: 2022-05-09

## 2022-05-25 ENCOUNTER — NON-APPOINTMENT (OUTPATIENT)
Age: 67
End: 2022-05-25

## 2022-05-25 ENCOUNTER — APPOINTMENT (OUTPATIENT)
Dept: INFECTIOUS DISEASE | Facility: CLINIC | Age: 67
End: 2022-05-25

## 2022-05-27 LAB
ALBUMIN SERPL ELPH-MCNC: 4.4 G/DL
ALP BLD-CCNC: 60 U/L
ALT SERPL-CCNC: 21 U/L
ANION GAP SERPL CALC-SCNC: 11 MMOL/L
AST SERPL-CCNC: 21 U/L
BASOPHILS # BLD AUTO: 0.03 K/UL
BASOPHILS NFR BLD AUTO: 0.4 %
BILIRUB SERPL-MCNC: 0.5 MG/DL
BUN SERPL-MCNC: 15 MG/DL
CALCIUM SERPL-MCNC: 9.6 MG/DL
CHLORIDE SERPL-SCNC: 101 MMOL/L
CO2 SERPL-SCNC: 27 MMOL/L
CREAT SERPL-MCNC: 0.77 MG/DL
EGFR: 84 ML/MIN/1.73M2
EOSINOPHIL # BLD AUTO: 0.04 K/UL
EOSINOPHIL NFR BLD AUTO: 0.5 %
GLUCOSE SERPL-MCNC: 102 MG/DL
HCT VFR BLD CALC: 41.8 %
HGB BLD-MCNC: 13.4 G/DL
IMM GRANULOCYTES NFR BLD AUTO: 0.4 %
LYMPHOCYTES # BLD AUTO: 2.84 K/UL
LYMPHOCYTES NFR BLD AUTO: 38.6 %
MAN DIFF?: NORMAL
MCHC RBC-ENTMCNC: 30.9 PG
MCHC RBC-ENTMCNC: 32.1 GM/DL
MCV RBC AUTO: 96.5 FL
MONOCYTES # BLD AUTO: 0.59 K/UL
MONOCYTES NFR BLD AUTO: 8 %
NEUTROPHILS # BLD AUTO: 3.83 K/UL
NEUTROPHILS NFR BLD AUTO: 52.1 %
PLATELET # BLD AUTO: 242 K/UL
POTASSIUM SERPL-SCNC: 4.5 MMOL/L
PROT SERPL-MCNC: 7.3 G/DL
RBC # BLD: 4.33 M/UL
RBC # FLD: 14 %
SODIUM SERPL-SCNC: 139 MMOL/L
WBC # FLD AUTO: 7.36 K/UL

## 2022-06-16 ENCOUNTER — NON-APPOINTMENT (OUTPATIENT)
Age: 67
End: 2022-06-16

## 2022-06-22 ENCOUNTER — APPOINTMENT (OUTPATIENT)
Dept: INFECTIOUS DISEASE | Facility: CLINIC | Age: 67
End: 2022-06-22

## 2022-06-22 ENCOUNTER — LABORATORY RESULT (OUTPATIENT)
Age: 67
End: 2022-06-22

## 2022-07-28 ENCOUNTER — APPOINTMENT (OUTPATIENT)
Dept: INFECTIOUS DISEASE | Facility: CLINIC | Age: 67
End: 2022-07-28

## 2022-07-28 VITALS
SYSTOLIC BLOOD PRESSURE: 106 MMHG | BODY MASS INDEX: 34.83 KG/M2 | OXYGEN SATURATION: 98 % | HEIGHT: 64 IN | DIASTOLIC BLOOD PRESSURE: 71 MMHG | WEIGHT: 204 LBS | HEART RATE: 84 BPM | TEMPERATURE: 98.7 F

## 2022-07-28 DIAGNOSIS — R76.12 NONSPECIFIC REACTION TO CELL MEDIATED IMMUNITY MEASUREMENT OF GAMMA INTERFERON ANTIGEN RESPONSE W/OUT ACTIVE TUBERCULOSIS: ICD-10-CM

## 2022-07-28 DIAGNOSIS — M05.20 RHEUMATOID VASCULITIS WITH RHEUMATOID ARTHRITIS OF UNSPECIFIED SITE: ICD-10-CM

## 2022-07-28 PROCEDURE — 99214 OFFICE O/P EST MOD 30 MIN: CPT

## 2022-07-31 PROBLEM — M05.20 RHEUMATOID ARTERITIS: Status: ACTIVE | Noted: 2022-04-21

## 2022-07-31 LAB
25(OH)D3 SERPL-MCNC: 43.3 NG/ML
ALBUMIN SERPL ELPH-MCNC: 4.5 G/DL
ALP BLD-CCNC: 54 U/L
ALT SERPL-CCNC: 32 U/L
ANION GAP SERPL CALC-SCNC: 12 MMOL/L
AST SERPL-CCNC: 23 U/L
BASOPHILS # BLD AUTO: 0.03 K/UL
BASOPHILS NFR BLD AUTO: 0.4 %
BILIRUB SERPL-MCNC: 0.2 MG/DL
BUN SERPL-MCNC: 13 MG/DL
CALCIUM SERPL-MCNC: 9.7 MG/DL
CHLORIDE SERPL-SCNC: 102 MMOL/L
CHOLEST SERPL-MCNC: 176 MG/DL
CO2 SERPL-SCNC: 27 MMOL/L
CREAT SERPL-MCNC: 0.72 MG/DL
EGFR: 92 ML/MIN/1.73M2
EOSINOPHIL # BLD AUTO: 0.07 K/UL
EOSINOPHIL NFR BLD AUTO: 1 %
ESTIMATED AVERAGE GLUCOSE: 134 MG/DL
GLUCOSE SERPL-MCNC: 116 MG/DL
HBA1C MFR BLD HPLC: 6.3 %
HCT VFR BLD CALC: 43.9 %
HDLC SERPL-MCNC: 59 MG/DL
HGB BLD-MCNC: 14.4 G/DL
IMM GRANULOCYTES NFR BLD AUTO: 0.3 %
LDLC SERPL CALC-MCNC: 102 MG/DL
LYMPHOCYTES # BLD AUTO: 2.56 K/UL
LYMPHOCYTES NFR BLD AUTO: 35 %
MAN DIFF?: NORMAL
MCHC RBC-ENTMCNC: 30.8 PG
MCHC RBC-ENTMCNC: 32.8 GM/DL
MCV RBC AUTO: 94 FL
MONOCYTES # BLD AUTO: 0.59 K/UL
MONOCYTES NFR BLD AUTO: 8.1 %
NEUTROPHILS # BLD AUTO: 4.05 K/UL
NEUTROPHILS NFR BLD AUTO: 55.2 %
NONHDLC SERPL-MCNC: 117 MG/DL
PLATELET # BLD AUTO: 235 K/UL
POTASSIUM SERPL-SCNC: 4.3 MMOL/L
PROT SERPL-MCNC: 6.9 G/DL
RBC # BLD: 4.67 M/UL
RBC # FLD: 13.2 %
RHEUMATOID FACT SER QL: 14 IU/ML
SODIUM SERPL-SCNC: 141 MMOL/L
TRIGL SERPL-MCNC: 78 MG/DL
WBC # FLD AUTO: 7.32 K/UL

## 2022-08-03 ENCOUNTER — NON-APPOINTMENT (OUTPATIENT)
Age: 67
End: 2022-08-03

## 2022-08-04 RX ORDER — RIFAMPIN 300 MG/1
300 CAPSULE ORAL
Qty: 180 | Refills: 0 | Status: COMPLETED | COMMUNITY
Start: 2022-05-10 | End: 2022-10-30

## 2022-08-09 ENCOUNTER — NON-APPOINTMENT (OUTPATIENT)
Age: 67
End: 2022-08-09

## 2022-09-13 ENCOUNTER — APPOINTMENT (OUTPATIENT)
Dept: INFECTIOUS DISEASE | Facility: CLINIC | Age: 67
End: 2022-09-13

## 2022-09-15 ENCOUNTER — APPOINTMENT (OUTPATIENT)
Dept: INFECTIOUS DISEASE | Facility: CLINIC | Age: 67
End: 2022-09-15

## 2022-09-16 LAB
ALBUMIN SERPL ELPH-MCNC: 4.2 G/DL
ALP BLD-CCNC: 50 U/L
ALT SERPL-CCNC: 23 U/L
ANION GAP SERPL CALC-SCNC: 12 MMOL/L
AST SERPL-CCNC: 22 U/L
BASOPHILS # BLD AUTO: 0.02 K/UL
BASOPHILS NFR BLD AUTO: 0.3 %
BILIRUB SERPL-MCNC: 0.3 MG/DL
BUN SERPL-MCNC: 11 MG/DL
CALCIUM SERPL-MCNC: 9.7 MG/DL
CHLORIDE SERPL-SCNC: 104 MMOL/L
CO2 SERPL-SCNC: 26 MMOL/L
CREAT SERPL-MCNC: 0.68 MG/DL
EGFR: 95 ML/MIN/1.73M2
EOSINOPHIL # BLD AUTO: 0.05 K/UL
EOSINOPHIL NFR BLD AUTO: 0.9 %
GLUCOSE SERPL-MCNC: 114 MG/DL
HCT VFR BLD CALC: 42.4 %
HGB BLD-MCNC: 13.7 G/DL
IMM GRANULOCYTES NFR BLD AUTO: 0.3 %
LYMPHOCYTES # BLD AUTO: 1.75 K/UL
LYMPHOCYTES NFR BLD AUTO: 30.1 %
MAN DIFF?: NORMAL
MCHC RBC-ENTMCNC: 30.4 PG
MCHC RBC-ENTMCNC: 32.3 GM/DL
MCV RBC AUTO: 94 FL
MONOCYTES # BLD AUTO: 0.46 K/UL
MONOCYTES NFR BLD AUTO: 7.9 %
NEUTROPHILS # BLD AUTO: 3.51 K/UL
NEUTROPHILS NFR BLD AUTO: 60.5 %
PLATELET # BLD AUTO: 230 K/UL
POTASSIUM SERPL-SCNC: 4.8 MMOL/L
PROT SERPL-MCNC: 6.8 G/DL
RBC # BLD: 4.51 M/UL
RBC # FLD: 13.4 %
SODIUM SERPL-SCNC: 142 MMOL/L
WBC # FLD AUTO: 5.81 K/UL

## 2022-09-19 ENCOUNTER — NON-APPOINTMENT (OUTPATIENT)
Age: 67
End: 2022-09-19

## 2023-03-09 NOTE — DISCHARGE NOTE NURSING/CASE MANAGEMENT/SOCIAL WORK - NSDPDISTO_GEN_ALL_CORE
Bactrim Counseling:  I discussed with the patient the risks of sulfa antibiotics including but not limited to GI upset, allergic reaction, drug rash, diarrhea, dizziness, photosensitivity, and yeast infections.  Rarely, more serious reactions can occur including but not limited to aplastic anemia, agranulocytosis, methemoglobinemia, blood dyscrasias, liver or kidney failure, lung infiltrates or desquamative/blistering drug rashes. Azelaic Acid Pregnancy And Lactation Text: This medication is considered safe during pregnancy and breast feeding. Topical Clindamycin Counseling: Patient counseled that this medication may cause skin irritation or allergic reactions.  In the event of skin irritation, the patient was advised to reduce the amount of the drug applied or use it less frequently.   The patient verbalized understanding of the proper use and possible adverse effects of clindamycin.  All of the patient's questions and concerns were addressed. Sarecycline Counseling: Patient advised regarding possible photosensitivity and discoloration of the teeth, skin, lips, tongue and gums.  Patient instructed to avoid sunlight, if possible.  When exposed to sunlight, patients should wear protective clothing, sunglasses, and sunscreen.  The patient was instructed to call the office immediately if the following severe adverse effects occur:  hearing changes, easy bruising/bleeding, severe headache, or vision changes.  The patient verbalized understanding of the proper use and possible adverse effects of sarecycline.  All of the patient's questions and concerns were addressed. Aklief Pregnancy And Lactation Text: It is unknown if this medication is safe to use during pregnancy.  It is unknown if this medication is excreted in breast milk.  Breastfeeding women should use the topical cream on the smallest area of the skin for the shortest time needed while breastfeeding.  Do not apply to nipple and areola. Tazorac Counseling:  Patient advised that medication is irritating and drying.  Patient may need to apply sparingly and wash off after an hour before eventually leaving it on overnight.  The patient verbalized understanding of the proper use and possible adverse effects of tazorac.  All of the patient's questions and concerns were addressed. Doxycycline Pregnancy And Lactation Text: This medication is Pregnancy Category D and not consider safe during pregnancy. It is also excreted in breast milk but is considered safe for shorter treatment courses. Home Minocycline Counseling: Patient advised regarding possible photosensitivity and discoloration of the teeth, skin, lips, tongue and gums.  Patient instructed to avoid sunlight, if possible.  When exposed to sunlight, patients should wear protective clothing, sunglasses, and sunscreen.  The patient was instructed to call the office immediately if the following severe adverse effects occur:  hearing changes, easy bruising/bleeding, severe headache, or vision changes.  The patient verbalized understanding of the proper use and possible adverse effects of minocycline.  All of the patient's questions and concerns were addressed. Azithromycin Counseling:  I discussed with the patient the risks of azithromycin including but not limited to GI upset, allergic reaction, drug rash, diarrhea, and yeast infections. Dapsone Pregnancy And Lactation Text: This medication is Pregnancy Category C and is not considered safe during pregnancy or breast feeding. Use Enhanced Medication Counseling?: No Tetracycline Pregnancy And Lactation Text: This medication is Pregnancy Category D and not consider safe during pregnancy. It is also excreted in breast milk. Topical Sulfur Applications Pregnancy And Lactation Text: This medication is Pregnancy Category C and has an unknown safety profile during pregnancy. It is unknown if this topical medication is excreted in breast milk. Topical Retinoid counseling:  Patient advised to apply a pea-sized amount only at bedtime and wait 30 minutes after washing their face before applying.  If too drying, patient may add a non-comedogenic moisturizer. The patient verbalized understanding of the proper use and possible adverse effects of retinoids.  All of the patient's questions and concerns were addressed. Winlevi Pregnancy And Lactation Text: This medication is considered safe during pregnancy and breastfeeding. Detail Level: Zone High Dose Vitamin A Counseling: Side effects reviewed, pt to contact office should one occur. Birth Control Pills Pregnancy And Lactation Text: This medication should be avoided if pregnant and for the first 30 days post-partum. Isotretinoin Counseling: Patient should get monthly blood tests, not donate blood, not drive at night if vision affected, not share medication, and not undergo elective surgery for 6 months after tx completed. Side effects reviewed, pt to contact office should one occur. Bactrim Pregnancy And Lactation Text: This medication is Pregnancy Category D and is known to cause fetal risk.  It is also excreted in breast milk. Spironolactone Pregnancy And Lactation Text: This medication can cause feminization of the male fetus and should be avoided during pregnancy. The active metabolite is also found in breast milk. Benzoyl Peroxide Counseling: Patient counseled that medicine may cause skin irritation and bleach clothing.  In the event of skin irritation, the patient was advised to reduce the amount of the drug applied or use it less frequently.   The patient verbalized understanding of the proper use and possible adverse effects of benzoyl peroxide.  All of the patient's questions and concerns were addressed. Erythromycin Counseling:  I discussed with the patient the risks of erythromycin including but not limited to GI upset, allergic reaction, drug rash, diarrhea, increase in liver enzymes, and yeast infections. Tazorac Pregnancy And Lactation Text: This medication is not safe during pregnancy. It is unknown if this medication is excreted in breast milk. Azelaic Acid Counseling: Patient counseled that medicine may cause skin irritation and to avoid applying near the eyes.  In the event of skin irritation, the patient was advised to reduce the amount of the drug applied or use it less frequently.   The patient verbalized understanding of the proper use and possible adverse effects of azelaic acid.  All of the patient's questions and concerns were addressed. Topical Clindamycin Pregnancy And Lactation Text: This medication is Pregnancy Category B and is considered safe during pregnancy. It is unknown if it is excreted in breast milk. Aklief counseling:  Patient advised to apply a pea-sized amount only at bedtime and wait 30 minutes after washing their face before applying.  If too drying, patient may add a non-comedogenic moisturizer.  The most commonly reported side effects including irritation, redness, scaling, dryness, stinging, burning, itching, and increased risk of sunburn.  The patient verbalized understanding of the proper use and possible adverse effects of retinoids.  All of the patient's questions and concerns were addressed. Azithromycin Pregnancy And Lactation Text: This medication is considered safe during pregnancy and is also secreted in breast milk. Doxycycline Counseling:  Patient counseled regarding possible photosensitivity and increased risk for sunburn.  Patient instructed to avoid sunlight, if possible.  When exposed to sunlight, patients should wear protective clothing, sunglasses, and sunscreen.  The patient was instructed to call the office immediately if the following severe adverse effects occur:  hearing changes, easy bruising/bleeding, severe headache, or vision changes.  The patient verbalized understanding of the proper use and possible adverse effects of doxycycline.  All of the patient's questions and concerns were addressed. High Dose Vitamin A Pregnancy And Lactation Text: High dose vitamin A therapy is contraindicated during pregnancy and breast feeding. Dapsone Counseling: I discussed with the patient the risks of dapsone including but not limited to hemolytic anemia, agranulocytosis, rashes, methemoglobinemia, kidney failure, peripheral neuropathy, headaches, GI upset, and liver toxicity.  Patients who start dapsone require monitoring including baseline LFTs and weekly CBCs for the first month, then every month thereafter.  The patient verbalized understanding of the proper use and possible adverse effects of dapsone.  All of the patient's questions and concerns were addressed. Benzoyl Peroxide Pregnancy And Lactation Text: This medication is Pregnancy Category C. It is unknown if benzoyl peroxide is excreted in breast milk. Winlevi Counseling:  I discussed with the patient the risks of topical clascoterone including but not limited to erythema, scaling, itching, and stinging. Patient voiced their understanding. Tetracycline Counseling: Patient counseled regarding possible photosensitivity and increased risk for sunburn.  Patient instructed to avoid sunlight, if possible.  When exposed to sunlight, patients should wear protective clothing, sunglasses, and sunscreen.  The patient was instructed to call the office immediately if the following severe adverse effects occur:  hearing changes, easy bruising/bleeding, severe headache, or vision changes.  The patient verbalized understanding of the proper use and possible adverse effects of tetracycline.  All of the patient's questions and concerns were addressed. Patient understands to avoid pregnancy while on therapy due to potential birth defects. Topical Retinoid Pregnancy And Lactation Text: This medication is Pregnancy Category C. It is unknown if this medication is excreted in breast milk. Spironolactone Counseling: Patient advised regarding risks of diarrhea, abdominal pain, hyperkalemia, birth defects (for female patients), liver toxicity and renal toxicity. The patient may need blood work to monitor liver and kidney function and potassium levels while on therapy. The patient verbalized understanding of the proper use and possible adverse effects of spironolactone.  All of the patient's questions and concerns were addressed. Topical Sulfur Applications Counseling: Topical Sulfur Counseling: Patient counseled that this medication may cause skin irritation or allergic reactions.  In the event of skin irritation, the patient was advised to reduce the amount of the drug applied or use it less frequently.   The patient verbalized understanding of the proper use and possible adverse effects of topical sulfur application.  All of the patient's questions and concerns were addressed. Isotretinoin Pregnancy And Lactation Text: This medication is Pregnancy Category X and is considered extremely dangerous during pregnancy. It is unknown if it is excreted in breast milk. Birth Control Pills Counseling: Birth Control Pill Counseling: I discussed with the patient the potential side effects of OCPs including but not limited to increased risk of stroke, heart attack, thrombophlebitis, deep venous thrombosis, hepatic adenomas, breast changes, GI upset, headaches, and depression.  The patient verbalized understanding of the proper use and possible adverse effects of OCPs. All of the patient's questions and concerns were addressed. Erythromycin Pregnancy And Lactation Text: This medication is Pregnancy Category B and is considered safe during pregnancy. It is also excreted in breast milk.

## 2023-08-25 NOTE — ED ADULT NURSE NOTE - CAS DISCH BELONGINGS RETURNED
If symptoms resolving, no urgent need for follow up.  Ok to schedule out further -next available.  Can check lab at that visit - uric acid for gout, etc.     Yes

## 2024-08-28 NOTE — CONSULT NOTE ADULT - SUBJECTIVE AND OBJECTIVE BOX
ORTHOPEDIC SURGERY  Xavier Baron DO      PRIMARY CARE PHYSICIAN:  Beverly Carreno APNP    CHIEF COMPLAINT:    Chief Complaint   Patient presents with    Surgical Followup     Left total hip, DOS 6/19, 10 weeks out, not having any pain 0/10, taking tylenol arthritis         HISTORY OF PRESENT ILLNESS:  Ms. Mendez is a 52 year old female who presents today for Surgical Followup (Left total hip, DOS 6/19, 10 weeks out, not having any pain 0/10, taking tylenol arthritis )  .     REVIEW OF SYSTEMS:  Constitutional:  Patient denies fever, chills, tiredness or malaise.  Respiratory:  Denies cough or shortness of breath.  Denies history of problems with intubation or anesthesia.  Cardiovascular:  Denies chest pain or edema.   Musculoskeletal:  Negative except for HPI.    Past Medical History:   Diagnosis Date    Degenerative lumbar spinal stenosis     Dysuria     Essential (primary) hypertension 02/23/2024    Failed moderate sedation during procedure     GERD without esophagitis 02/23/2024    Hyperlipidemia, mixed 02/23/2024    Lumbar radiculopathy     Lyme disease, unspecified     Major depressive disorder, recurrent episode, in full remission (CMD) 02/23/2024    Mesenteric panniculitis  (CMD)     Motion sickness     Nondisplaced fracture of fifth metatarsal bone of right foot     Nondisplaced fracture of fifth metatarsal bone, right foot, initial encounter for closed fracture 03/04/2021    HIREN (obstructive sleep apnea)     does not use her machine, did not tolerate mask.    Polyarthralgia 02/23/2024    Prediabetes     Subclinical hypothyroidism 02/23/2024    Unilateral primary osteoarthritis, unspecified knee     Vaginal dryness        Social History     Socioeconomic History    Marital status: /Civil Union     Spouse name: Not on file    Number of children: Not on file    Years of education: Not on file    Highest education level: Not on file   Occupational History    Not on file   Tobacco Use     Smoking status: Never     Passive exposure: Never    Smokeless tobacco: Never   Vaping Use    Vaping status: never used   Substance and Sexual Activity    Alcohol use: Not Currently     Comment: occassional    Drug use: Never    Sexual activity: Not on file   Other Topics Concern    Not on file   Social History Narrative    Not on file     Social Determinants of Health     Financial Resource Strain: Low Risk  (6/19/2024)    Financial Resource Strain     Unable to Get: None   Food Insecurity: Low Risk  (6/19/2024)    Food Insecurity     Worried about Food: Never true     Food is Gone: Never true   Transportation Needs: Not At Risk (6/19/2024)    Transportation Needs     Lack of Reliable Transportation: No   Physical Activity: Not on file   Stress: Not on file   Social Connections: Low Risk  (6/19/2024)    Social Connections     Social Connectivity: 5 or more times a week   Interpersonal Safety: Low Risk  (6/19/2024)    Interpersonal Safety     How often physically hurt: Never     How often insulted or talked down to: Never     How often threatened with harm: Never     How often scream or curse at: Never       Family History   Problem Relation Age of Onset    Hypertension Mother     Hyperlipidemia Mother     Congestive Heart Failure Father     Other Maternal Grandmother         raynauds    Systemic Lupus Erythematosus Other         cousin       Current Outpatient Medications   Medication Sig    DULoxetine (CYMBALTA) 30 MG capsule Take 1 capsule by mouth daily.    buPROPion XL (WELLBUTRIN XL) 300 MG 24 hr tablet Take 1 tablet by mouth every morning.    fexofenadine (ALLEGRA) 180 MG tablet Take 1 tablet by mouth daily.    losartan (COZAAR) 50 MG tablet Take 1 tablet by mouth in the morning and 1 tablet in the evening.    simvastatin (ZOCOR) 20 MG tablet Take 1 tablet by mouth daily.    BIOTIN PO Take 1 tablet by mouth daily.    Cholecalciferol 25 mcg (1,000 units) capsule Take 25 mcg by mouth daily.    Cyanocobalamin  (VITAMIN B 12 PO) Take 1 tablet by mouth daily.    Aspirin-Acetaminophen-Caffeine (EXCEDRIN MIGRAINE PO) Take 1 tablet by mouth daily.    Multiple Vitamin (MULTIVITAMIN ADULT PO) Take 1 tablet by mouth daily.     No current facility-administered medications for this visit.       ALLERGIES:   Allergen Reactions    Erythromycin HIVES    Sulfa Antibiotics Other (See Comments)         PHYSICAL EXAMINATION:     Visit Vitals  Temp 98 °F (36.7 °C) (Temporal)   Ht 5' 8\" (1.727 m)   Wt 124.7 kg (275 lb)   BMI 41.81 kg/m²       Examination of the left hip:  Healed surgical incision.  No cellulitis or drainage.  Gross sensation intact distally.  Palpable distal pulse.  No subluxations or dislocations.  Mild tenderness to palpation about the surgical site.  Full range of motion of the surgical extremity. Weight bearing as tolerated      Diagnosis:  Status post total hip replacement, left  Body mass index is 41.81 kg/m². Morbid obesity (BMI >40 or 35+ and a comorbid condition)    Assessment:    The patient is eleven-weeks status post a left total hip arthroplasty.  The incision is intact and healing well with no drainage and no signs or symptoms of infection.      We'll continue with physical therapy if the patient continues to see progress and see the patient as needed.    4 weeks of DVT/PE chemical prophylaxis has been advised following a hip or knee replacement.    Plan:      -- Service to Weight Management   -- Activity Modification- Weight bearing as tolerated; Full Range of Motion  -- Continue physical therapy as the patient continues to see improvement  -- Follow-up as needed  -- Patient advised to call with any questions or concerns      All of the patient's questions were answered.    On 9/10/2024, Nilda SAUNDERS scribed the services personally performed by Dr. Xavier Baron.      Documentation recorded by the scribe accurately and completely reflects service(s) I personally performed and the decisions made by me.    CC:   Beverly Carreno APNP     9/17/2024  6:15 PM             Surgery Consult Note:    HPI: 66 yo F with pmh of HTN HLD DM arthritis s/p B/L oophorectomy for postmenopausal ovarian cysts in July 2020, complicated by hemoperitoneum and hemorrhagic shock, now presenting for fever, chills, in setting diarrhea x few days. States that diarrhea is many times a day, watery, and smells bad. Started when she had antibiotics. Also endorsing some dysuria.     Interval Events: Surgery Consult Note:    HPI: 66 yo F with pmh of HTN HLD DM arthritis s/p B/L oophorectomy for postmenopausal ovarian cysts in July 2020, complicated by hemoperitoneum and hemorrhagic shock, now presenting for fever, chills, in setting diarrhea x few days. States that diarrhea is many times a day, watery, and smells bad. Started when she had antibiotics (amoxicillin). Also endorsing some dysuria.     Interval Events: Patient Norwegian speaking, used  ID [id number], reports tenderness over abdomen where surgery took place.     PAST MEDICAL & SURGICAL HISTORY:  HTN (hypertension)  HLD (hyperlipidemia)  Arthritis  DM (diabetes mellitus)  Status post bilateral oophorectomy    Home Medications:  adalimumab 40 mg/0.4 mL subcutaneous kit: 40 milligram(s) subcutaneous every other week (29 Jul 2020 16:53)  amLODIPine 5 mg oral tablet: 1 tab(s) orally once a day (29 Jul 2020 16:54)  aspirin 81 mg oral tablet: orally once a day (29 Jul 2020 16:54)  atorvastatin 10 mg oral tablet: 1 tab(s) orally once a day (29 Jul 2020 16:56)  esomeprazole 40 mg oral delayed release capsule: 1 cap(s) orally once a day (29 Jul 2020 16:57)  folic acid 1 mg oral tablet: 1 tab(s) orally once a day (29 Jul 2020 16:57)  metFORMIN 500 mg oral tablet: 1 tab(s) orally 2 times a day (29 Jul 2020 16:59)  Methotrexate Sodium, Preservative Free:  (29 Jul 2020 17:00)    FAMILY HISTORY:    Social History:      T(C): 37.3 (08-05-20 @ 10:16), Max: 37.7 (08-05-20 @ 04:55)  T(F): 99.2 (08-05-20 @ 10:16), Max: 99.9 (08-05-20 @ 04:55)  HR: 71 (08-05-20 @ 10:16) (70 - 105)  BP: 115/92 (08-05-20 @ 10:16) (113/75 - 141/87)  RR: 16 (08-05-20 @ 10:16) (16 - 17)  SpO2: 99% (08-05-20 @ 10:16) (97% - 100%)    PHYSICAL EXAM:    GENERAL: NAD, lying in bed comfortably  HEAD:  Atraumatic, Normocephalic  ENT: Moist mucous membranes  CHEST/LUNG: Unlabored respirations  ABDOMEN: Soft, obese abdomen, diffuse ecchymosis over inferior aspect of panus extending laterally across both ASIS regions. Area of collection vs induration in the infraumbilical region and left lower quadrant immediately deep to previous surgical trochar insertion sites.    NERVOUS SYSTEM:  Alert & Oriented X3, speech clear.   SKIN: No rashes or lesions                 10.9   13.08  )----------(  328       ( 05 Aug 2020 05:44 )               34.0      137    |  101    |  14     ----------------------------<  141        ( 05 Aug 2020 05:44 )  3.9     |  22     |  0.70     Ca    9.5        ( 05 Aug 2020 05:44 )    TPro  7.3    /  Alb  3.6    /  TBili  3.2    /  DBili  x      /  AST  65     /  ALT  16     /  AlkPhos  47     ( 05 Aug 2020 05:44 )    PT/INR -  12.4 sec / 1.04 ratio   ( 05 Aug 2020 05:44 )       PTT -  24.6 sec   ( 05 Aug 2020 05:44 )  CAPILLARY BLOOD GLUCOSE

## 2025-03-20 NOTE — ED CDU PROVIDER DISPOSITION NOTE - NS ED MD DISPO DIVISION
"  [facility]   Physical Therapy Treatment Note     Name: Jose Rivera  Clinic Number: [id number]    Therapy Diagnosis:   Encounter Diagnoses   Name Primary?    Decreased ROM of right shoulder Yes    Weakness of right upper extremity     Decreased functional activity tolerance      Physician: Queta Robins PA-C    Visit Date: 3/20/2025    Physician Orders: PT Eval and Treat   Medical Diagnosis from Referral:   M19.011 (ICD-10-CM) - Osteoarthritis of right glenohumeral joint   M70.62 (ICD-10-CM) - Greater trochanteric bursitis of left hip      Evaluation Date: 2/14/2025  Authorization Period Expiration: 12/31/25  Plan of Care Expiration: 5/14/25  Visit # / Visits authorized: 2/ 20 Progress Note Due: 4/14/25  FOTO: 1/ 1     Precautions: Standard     Visit #: 2 of 20  PTA Visit #: 1/5  Time In: 0800  Time Out: 0900  Total Billable Time: 55 min    Subjective     Pt reports: the shoulder continues to be sore but doing better overall.   She  n/a  compliant with home exercise program.  Response to previous treatment: sore to right shoulder   Functional change: ongoing    Pain: 4/10  Location: right shoulder      Objective       Jose received the following manual therapy techniques: Joint mobilizations, oscillate, and PROM were applied to the: Right shoulder for 10 minutes, including:  Oscillating and PROM, distraction    Jose participated in neuromuscular re-education activities to improve: Coordination, Proprioception, Posture, and muscle control for 15 minutes. The following activities were included:  Standing:   Shoulder Row with RTB 2x10x3"  Shoulder Lat Ext with RTB 2x10x3"  Scapular Retraction 2x10x3"    Supine:  Shoulder flexion AAROM-supine /c 2# wand 2x10  Shoulder abduction AAROM- supine /c 2# wand 2x10x3": np  Shoulder ER AAROM (at 30-70 degree shoulder abduction)- supine /c 2# wand 2x10x3": np  Shoulder Chest press AAROM-supine /c 2# wand 2x10    Jose participated in dynamic " "functional therapeutic activities to improve functional performance for 15  minutes, including:  UBE 6 min (3 min fwd and 3 min bkwd)  Pendulum forward back- codman 90 sec  Pendulum lateral- codman 90 sec  Pendulum circles- codman 90 sec  Pulley 2 min shoulder Flexion, then Scaption   IR stretch with towel 10x10" holds       Jose received therapeutic exercises to develop strength, endurance, ROM, flexibility, and posture for 10 minutes including:  Seated thoracic ext vs towel roll x 20  Pec stretch on 1/2 roll x 2 min  Sidelying ER 3 x 10     Home Exercises Provided and Patient Education Provided     Written Home Exercises Provided: Patient instructed to cont prior HEP.  Exercises were reviewed and Jose was able to demonstrate them prior to the end of the session.  Jose demonstrated good  understanding of the education provided.     Education provided:    HEP    Assessment     Pt requires min cueing with postural correction with prescribed therex.  Progress shoulder stability activities with good performance. Mild c/o increased discomfort with prescribed activities.  Fair response to exercise progression. Demonstrates improved functional GH ROM.   Jose Is progressing well towards her goals.     Pt prognosis is Good.     Pt will continue to benefit from skilled outpatient physical therapy to address the deficits listed in the problem list box on initial evaluation, provide pt/family education and to maximize pt's level of independence in the home and community environment.     Pt's spiritual, cultural and educational needs considered and pt agreeable to plan of care and goals.     Anticipated barriers to physical therapy: none    Goals:   Short Term Goals: 4 weeks  1.Report decreased in pain at worse less than  <   / =  4  /10  to increase tolerance for functional mobility. On going  2. Pt to improve R shoulder range of motion by 25% to allow for improved functional mobility to allow for improvement in IADLs. On " going  3. Increased R shoulder MMT 1/2 grade to increase tolerance for ADL and work activities. On going  4. Pt to report be conscious of impaired sitting and standing posture daily to decrease pain. On going  5. Pt to tolerate HEP to improve ROM and independence with ADL's. On going     Long Term Goals: 8 weeks  1.Report decreased in pain at worse less than  <   / =  2  /10  to increase tolerance for functional mobility. On going  2.  Patient will demonstrate improved overall function per FOTO Survey to CI = at least 1% but < 20% impaired, limited or restricted score or less.On going  3.Increased R shoulder MMT 1 grade to increase tolerance for ADL and work activities.On going  4. Pt to report and demonstrate proper posture in standing and sitting to decrease pain. On going  5. Pt to be Independent with HEP to improve ROM and independence with ADL's.On going  6. Pt to improve R shoulder range of motion by 75% to allow for improved functional mobility to allow for improvement in IADLs. On going    Plan     Continue with established  PT Plan of Care towards patient goals.      Keith Silva, PT, DPT  3/20/2025            [facility]

## 2025-06-11 NOTE — ED PROVIDER NOTE - CARDIAC RATE
Patient Name: Akilah Gaston  1993    Dear Haseeb Jones MD,    I had the pleasure of seeing your patient for diabetic examination on 6/11/2025.  The results of your patient's eye exam are shown below.    Eyes were dilated on 6/11/2025.    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     (-) NVI, (-) NVE, (-) NVD, (+)Hemes, (-) Cotton wool spots, (-) Exudates, (-) Intraretinal microvascular abnormalities, (-) clinically significant macular edema.     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     (-) NVI, (-) NVE, (-) NVD, (+)Hemes, (-) Cotton wool spots, (-) Exudates, (-) Intraretinal microvascular abnormalities, (-) clinically significant macular edema.     Plan: observation; follow up in 1 year for annual Diabetic Eye Exam.     We discussed importance of good blood sugar control and target hemoglobin A1C of 7.0 or less. I reinforced the need to comply with the current systemic diabetic treatment regimen.       Jose Tran, OD  [address]  [facility] [id number]  [phone]  
TACHYCARDIC